# Patient Record
Sex: FEMALE | Race: BLACK OR AFRICAN AMERICAN | Employment: OTHER | URBAN - METROPOLITAN AREA
[De-identification: names, ages, dates, MRNs, and addresses within clinical notes are randomized per-mention and may not be internally consistent; named-entity substitution may affect disease eponyms.]

---

## 2017-01-03 ENCOUNTER — TELEPHONE (OUTPATIENT)
Dept: PRIMARY CARE CLINIC | Age: 67
End: 2017-01-03

## 2017-01-04 ENCOUNTER — OFFICE VISIT (OUTPATIENT)
Dept: PRIMARY CARE CLINIC | Age: 67
End: 2017-01-04

## 2017-01-04 VITALS
WEIGHT: 171 LBS | HEART RATE: 90 BPM | SYSTOLIC BLOOD PRESSURE: 130 MMHG | DIASTOLIC BLOOD PRESSURE: 80 MMHG | OXYGEN SATURATION: 98 % | BODY MASS INDEX: 29.35 KG/M2

## 2017-01-04 DIAGNOSIS — D17.1 LIPOMA OF BACK: Primary | ICD-10-CM

## 2017-01-04 PROCEDURE — 99213 OFFICE O/P EST LOW 20 MIN: CPT | Performed by: INTERNAL MEDICINE

## 2017-01-04 ASSESSMENT — ENCOUNTER SYMPTOMS
COUGH: 0
ABDOMINAL DISTENTION: 0
ALLERGIC/IMMUNOLOGIC NEGATIVE: 1
EYES NEGATIVE: 1
DIARRHEA: 0
CONSTIPATION: 0
BLOOD IN STOOL: 0
SHORTNESS OF BREATH: 0
WHEEZING: 0
CHEST TIGHTNESS: 0

## 2017-01-13 ENCOUNTER — INITIAL CONSULT (OUTPATIENT)
Dept: SURGERY | Age: 67
End: 2017-01-13

## 2017-01-13 ENCOUNTER — TELEPHONE (OUTPATIENT)
Dept: SURGERY | Age: 67
End: 2017-01-13

## 2017-01-13 VITALS
WEIGHT: 172 LBS | HEART RATE: 82 BPM | BODY MASS INDEX: 29.37 KG/M2 | SYSTOLIC BLOOD PRESSURE: 138 MMHG | DIASTOLIC BLOOD PRESSURE: 68 MMHG | HEIGHT: 64 IN

## 2017-01-13 DIAGNOSIS — D17.1 LIPOMA OF BACK: Primary | ICD-10-CM

## 2017-01-13 PROCEDURE — 99204 OFFICE O/P NEW MOD 45 MIN: CPT | Performed by: SURGERY

## 2017-01-13 ASSESSMENT — ENCOUNTER SYMPTOMS
COUGH: 0
BACK PAIN: 1
ABDOMINAL DISTENTION: 0
COLOR CHANGE: 0
NAUSEA: 0
DIARRHEA: 0
ABDOMINAL PAIN: 0
TROUBLE SWALLOWING: 0
BLOOD IN STOOL: 0
VOMITING: 0
CONSTIPATION: 0
RECTAL PAIN: 0
SHORTNESS OF BREATH: 0

## 2017-01-18 ENCOUNTER — PAT TELEPHONE (OUTPATIENT)
Dept: PREADMISSION TESTING | Age: 67
End: 2017-01-18

## 2017-01-18 VITALS — HEIGHT: 64 IN | WEIGHT: 172 LBS | BODY MASS INDEX: 29.37 KG/M2

## 2017-01-23 ENCOUNTER — TELEPHONE (OUTPATIENT)
Dept: SURGERY | Age: 67
End: 2017-01-23

## 2017-01-23 ENCOUNTER — HOSPITAL ENCOUNTER (OUTPATIENT)
Dept: SURGERY | Age: 67
Discharge: HOME | End: 2017-01-23
Attending: SURGERY | Admitting: SURGERY

## 2017-01-23 VITALS
OXYGEN SATURATION: 100 % | SYSTOLIC BLOOD PRESSURE: 134 MMHG | DIASTOLIC BLOOD PRESSURE: 76 MMHG | HEART RATE: 76 BPM | TEMPERATURE: 98 F | RESPIRATION RATE: 20 BRPM

## 2017-01-23 DIAGNOSIS — R22.2 MASS ON BACK: ICD-10-CM

## 2017-01-23 LAB
ANION GAP SERPL CALCULATED.3IONS-SCNC: 15 MMOL/L (ref 3–16)
BUN BLDV-MCNC: 18 MG/DL (ref 7–20)
CALCIUM SERPL-MCNC: 9.2 MG/DL (ref 8.3–10.6)
CHLORIDE BLD-SCNC: 99 MMOL/L (ref 99–110)
CO2: 25 MMOL/L (ref 21–32)
CREAT SERPL-MCNC: 0.9 MG/DL (ref 0.6–1.2)
GFR AFRICAN AMERICAN: >60
GFR NON-AFRICAN AMERICAN: >60
GLUCOSE BLD-MCNC: 100 MG/DL (ref 70–99)
HCT VFR BLD CALC: 33 % (ref 36–48)
HEMOGLOBIN: 10.9 G/DL (ref 12–16)
MCH RBC QN AUTO: 30 PG (ref 26–34)
MCHC RBC AUTO-ENTMCNC: 32.9 G/DL (ref 31–36)
MCV RBC AUTO: 91.3 FL (ref 80–100)
PDW BLD-RTO: 13.2 % (ref 12.4–15.4)
PLATELET # BLD: 305 K/UL (ref 135–450)
PMV BLD AUTO: 6.6 FL (ref 5–10.5)
POTASSIUM SERPL-SCNC: 4 MMOL/L (ref 3.5–5.1)
RBC # BLD: 3.61 M/UL (ref 4–5.2)
SODIUM BLD-SCNC: 139 MMOL/L (ref 136–145)
WBC # BLD: 5.8 K/UL (ref 4–11)

## 2017-01-23 PROCEDURE — 21931 EXC BACK LES SC 3 CM/>: CPT | Performed by: SURGERY

## 2017-01-23 PROCEDURE — 93010 ELECTROCARDIOGRAM REPORT: CPT | Performed by: INTERNAL MEDICINE

## 2017-01-23 RX ORDER — HYDROCODONE BITARTRATE AND ACETAMINOPHEN 5; 325 MG/1; MG/1
1 TABLET ORAL EVERY 6 HOURS PRN
Qty: 20 TABLET | Refills: 0 | Status: SHIPPED | OUTPATIENT
Start: 2017-01-23 | End: 2017-01-30

## 2017-01-23 RX ORDER — FENTANYL CITRATE 50 UG/ML
25 INJECTION, SOLUTION INTRAMUSCULAR; INTRAVENOUS EVERY 5 MIN PRN
Status: DISCONTINUED | OUTPATIENT
Start: 2017-01-23 | End: 2017-01-24 | Stop reason: HOSPADM

## 2017-01-23 RX ORDER — LABETALOL HYDROCHLORIDE 5 MG/ML
5 INJECTION, SOLUTION INTRAVENOUS EVERY 10 MIN PRN
Status: DISCONTINUED | OUTPATIENT
Start: 2017-01-23 | End: 2017-01-24 | Stop reason: HOSPADM

## 2017-01-23 RX ORDER — SODIUM CHLORIDE 0.9 % (FLUSH) 0.9 %
10 SYRINGE (ML) INJECTION PRN
Status: DISCONTINUED | OUTPATIENT
Start: 2017-01-23 | End: 2017-01-24 | Stop reason: HOSPADM

## 2017-01-23 RX ORDER — SODIUM CHLORIDE 9 MG/ML
INJECTION, SOLUTION INTRAVENOUS CONTINUOUS
Status: DISCONTINUED | OUTPATIENT
Start: 2017-01-23 | End: 2017-01-24 | Stop reason: HOSPADM

## 2017-01-23 RX ORDER — PROMETHAZINE HYDROCHLORIDE 25 MG/ML
6.25 INJECTION, SOLUTION INTRAMUSCULAR; INTRAVENOUS
Status: ACTIVE | OUTPATIENT
Start: 2017-01-23 | End: 2017-01-23

## 2017-01-23 RX ORDER — SODIUM CHLORIDE 0.9 % (FLUSH) 0.9 %
10 SYRINGE (ML) INJECTION EVERY 12 HOURS SCHEDULED
Status: DISCONTINUED | OUTPATIENT
Start: 2017-01-23 | End: 2017-01-24 | Stop reason: HOSPADM

## 2017-01-23 RX ADMIN — SODIUM CHLORIDE: 9 INJECTION, SOLUTION INTRAVENOUS at 07:20

## 2017-01-23 ASSESSMENT — PAIN - FUNCTIONAL ASSESSMENT: PAIN_FUNCTIONAL_ASSESSMENT: 0-10

## 2017-01-23 ASSESSMENT — PAIN DESCRIPTION - PAIN TYPE
TYPE: SURGICAL PAIN

## 2017-01-23 ASSESSMENT — PAIN SCALES - GENERAL
PAINLEVEL_OUTOF10: 0

## 2017-01-24 LAB
EKG ATRIAL RATE: 61 BPM
EKG DIAGNOSIS: NORMAL
EKG P AXIS: 59 DEGREES
EKG P-R INTERVAL: 172 MS
EKG Q-T INTERVAL: 404 MS
EKG QRS DURATION: 106 MS
EKG QTC CALCULATION (BAZETT): 406 MS
EKG R AXIS: 1 DEGREES
EKG T AXIS: 59 DEGREES
EKG VENTRICULAR RATE: 61 BPM

## 2017-01-27 ENCOUNTER — TELEPHONE (OUTPATIENT)
Dept: SURGERY | Age: 67
End: 2017-01-27

## 2017-02-01 ENCOUNTER — TELEPHONE (OUTPATIENT)
Dept: SURGERY | Age: 67
End: 2017-02-01

## 2017-02-10 ENCOUNTER — OFFICE VISIT (OUTPATIENT)
Dept: SURGERY | Age: 67
End: 2017-02-10

## 2017-02-10 VITALS
HEART RATE: 90 BPM | WEIGHT: 176 LBS | HEIGHT: 64 IN | SYSTOLIC BLOOD PRESSURE: 136 MMHG | BODY MASS INDEX: 30.05 KG/M2 | DIASTOLIC BLOOD PRESSURE: 79 MMHG

## 2017-02-10 DIAGNOSIS — R22.2 MASS ON BACK: Primary | ICD-10-CM

## 2017-02-10 PROCEDURE — 99024 POSTOP FOLLOW-UP VISIT: CPT | Performed by: SURGERY

## 2017-02-13 ENCOUNTER — TELEPHONE (OUTPATIENT)
Dept: SURGERY | Age: 67
End: 2017-02-13

## 2017-02-16 ENCOUNTER — TELEPHONE (OUTPATIENT)
Dept: SURGERY | Age: 67
End: 2017-02-16

## 2017-02-16 DIAGNOSIS — Z01.818 PRE-OP EVALUATION: Primary | ICD-10-CM

## 2017-02-23 ENCOUNTER — HOSPITAL ENCOUNTER (OUTPATIENT)
Dept: CT IMAGING | Age: 67
Discharge: HOME | End: 2017-02-23
Attending: SURGERY | Admitting: SURGERY

## 2017-02-23 VITALS
RESPIRATION RATE: 18 BRPM | WEIGHT: 175.27 LBS | HEART RATE: 62 BPM | TEMPERATURE: 96.8 F | BODY MASS INDEX: 29.92 KG/M2 | SYSTOLIC BLOOD PRESSURE: 119 MMHG | DIASTOLIC BLOOD PRESSURE: 63 MMHG | OXYGEN SATURATION: 100 % | HEIGHT: 64 IN

## 2017-02-23 DIAGNOSIS — R22.2 LOCALIZED SWELLING, MASS AND LUMP, TRUNK: ICD-10-CM

## 2017-02-23 DIAGNOSIS — Z01.818 PRE-OP EVALUATION: ICD-10-CM

## 2017-02-23 DIAGNOSIS — R22.2 MASS ON BACK: ICD-10-CM

## 2017-02-23 LAB
INR BLD: 1.02 (ref 0.85–1.15)
PLATELET # BLD: 337 K/UL (ref 135–450)
PROTHROMBIN TIME: 11.5 SEC (ref 9.6–13)

## 2017-02-23 RX ORDER — FENTANYL CITRATE 50 UG/ML
INJECTION, SOLUTION INTRAMUSCULAR; INTRAVENOUS DAILY PRN
Status: COMPLETED | OUTPATIENT
Start: 2017-02-23 | End: 2017-02-23

## 2017-02-23 RX ORDER — ONDANSETRON 2 MG/ML
4 INJECTION INTRAMUSCULAR; INTRAVENOUS EVERY 8 HOURS PRN
Status: DISCONTINUED | OUTPATIENT
Start: 2017-02-23 | End: 2017-02-24 | Stop reason: HOSPADM

## 2017-02-23 RX ORDER — MIDAZOLAM HYDROCHLORIDE 1 MG/ML
INJECTION INTRAMUSCULAR; INTRAVENOUS DAILY PRN
Status: COMPLETED | OUTPATIENT
Start: 2017-02-23 | End: 2017-02-23

## 2017-02-23 RX ADMIN — FENTANYL CITRATE 50 MCG: 50 INJECTION, SOLUTION INTRAMUSCULAR; INTRAVENOUS at 12:20

## 2017-02-23 RX ADMIN — MIDAZOLAM HYDROCHLORIDE 1 MG: 1 INJECTION INTRAMUSCULAR; INTRAVENOUS at 12:20

## 2017-02-23 ASSESSMENT — PAIN - FUNCTIONAL ASSESSMENT: PAIN_FUNCTIONAL_ASSESSMENT: 0-10

## 2017-02-26 LAB
BODY FLUID CULTURE, STERILE: NORMAL
GRAM STAIN RESULT: NORMAL

## 2017-02-28 ENCOUNTER — TELEPHONE (OUTPATIENT)
Dept: SURGERY | Age: 67
End: 2017-02-28

## 2017-04-24 RX ORDER — AMLODIPINE BESYLATE 5 MG/1
5 TABLET ORAL DAILY
Qty: 30 TABLET | Refills: 3 | Status: SHIPPED | OUTPATIENT
Start: 2017-04-24 | End: 2017-04-26

## 2017-04-26 ENCOUNTER — OFFICE VISIT (OUTPATIENT)
Dept: PRIMARY CARE CLINIC | Age: 67
End: 2017-04-26

## 2017-04-26 VITALS
WEIGHT: 173 LBS | SYSTOLIC BLOOD PRESSURE: 120 MMHG | HEART RATE: 72 BPM | BODY MASS INDEX: 29.7 KG/M2 | TEMPERATURE: 98 F | DIASTOLIC BLOOD PRESSURE: 70 MMHG | OXYGEN SATURATION: 98 %

## 2017-04-26 DIAGNOSIS — I10 ESSENTIAL HYPERTENSION: Primary | ICD-10-CM

## 2017-04-26 DIAGNOSIS — T78.40XD ALLERGY, SUBSEQUENT ENCOUNTER: ICD-10-CM

## 2017-04-26 PROCEDURE — 99213 OFFICE O/P EST LOW 20 MIN: CPT | Performed by: INTERNAL MEDICINE

## 2017-04-26 RX ORDER — SENNA AND DOCUSATE SODIUM 50; 8.6 MG/1; MG/1
1 TABLET, FILM COATED ORAL DAILY
COMMUNITY
End: 2017-09-19

## 2017-04-26 RX ORDER — CELECOXIB 200 MG/1
200 CAPSULE ORAL 2 TIMES DAILY
COMMUNITY
End: 2018-03-22

## 2017-04-26 RX ORDER — OXYCODONE HYDROCHLORIDE 5 MG/1
5 TABLET ORAL EVERY 4 HOURS PRN
COMMUNITY
End: 2017-04-26

## 2017-04-26 RX ORDER — HYDROCHLOROTHIAZIDE 25 MG/1
12.5 TABLET ORAL DAILY
Qty: 30 TABLET | Refills: 3 | Status: SHIPPED | OUTPATIENT
Start: 2017-04-26 | End: 2017-09-19 | Stop reason: SDUPTHER

## 2017-04-26 RX ORDER — OXYCODONE HYDROCHLORIDE 5 MG/1
5 CAPSULE ORAL EVERY 4 HOURS PRN
COMMUNITY
End: 2017-09-19

## 2017-04-26 ASSESSMENT — ENCOUNTER SYMPTOMS
DIARRHEA: 0
SHORTNESS OF BREATH: 0
ABDOMINAL DISTENTION: 0
ALLERGIC REACTION: 1
CHEST TIGHTNESS: 0
ALLERGIC/IMMUNOLOGIC NEGATIVE: 1
CONSTIPATION: 0
EYES NEGATIVE: 1
BLOOD IN STOOL: 0
WHEEZING: 0
COUGH: 0

## 2017-05-03 ENCOUNTER — TELEPHONE (OUTPATIENT)
Dept: PRIMARY CARE CLINIC | Age: 67
End: 2017-05-03

## 2017-08-25 DIAGNOSIS — K21.9 GASTROESOPHAGEAL REFLUX DISEASE WITHOUT ESOPHAGITIS: ICD-10-CM

## 2017-08-25 RX ORDER — ESOMEPRAZOLE MAGNESIUM 40 MG/1
40 CAPSULE, DELAYED RELEASE ORAL DAILY PRN
Qty: 30 CAPSULE | Refills: 3 | Status: SHIPPED | OUTPATIENT
Start: 2017-08-25 | End: 2018-03-22 | Stop reason: SDUPTHER

## 2017-09-19 ENCOUNTER — OFFICE VISIT (OUTPATIENT)
Dept: PRIMARY CARE CLINIC | Age: 67
End: 2017-09-19

## 2017-09-19 VITALS
SYSTOLIC BLOOD PRESSURE: 150 MMHG | HEIGHT: 65 IN | RESPIRATION RATE: 16 BRPM | DIASTOLIC BLOOD PRESSURE: 80 MMHG | HEART RATE: 78 BPM | OXYGEN SATURATION: 98 % | WEIGHT: 191 LBS | TEMPERATURE: 98.2 F | BODY MASS INDEX: 31.82 KG/M2

## 2017-09-19 DIAGNOSIS — N61.0 CELLULITIS OF LEFT BREAST: Primary | ICD-10-CM

## 2017-09-19 DIAGNOSIS — Z23 FLU VACCINE NEED: ICD-10-CM

## 2017-09-19 DIAGNOSIS — E78.5 HYPERLIPIDEMIA, UNSPECIFIED HYPERLIPIDEMIA TYPE: ICD-10-CM

## 2017-09-19 DIAGNOSIS — Z23 NEED FOR TDAP VACCINATION: ICD-10-CM

## 2017-09-19 DIAGNOSIS — I10 ESSENTIAL HYPERTENSION: ICD-10-CM

## 2017-09-19 PROCEDURE — 99214 OFFICE O/P EST MOD 30 MIN: CPT | Performed by: INTERNAL MEDICINE

## 2017-09-19 RX ORDER — CEPHALEXIN 500 MG/1
500 CAPSULE ORAL 3 TIMES DAILY
Qty: 30 CAPSULE | Refills: 0 | Status: SHIPPED | OUTPATIENT
Start: 2017-09-19 | End: 2018-03-22

## 2017-09-19 RX ORDER — HYDROCHLOROTHIAZIDE 25 MG/1
12.5 TABLET ORAL DAILY
Qty: 30 TABLET | Refills: 3 | Status: SHIPPED | OUTPATIENT
Start: 2017-09-19 | End: 2018-03-22 | Stop reason: SDUPTHER

## 2017-09-19 RX ORDER — SIMVASTATIN 20 MG
20 TABLET ORAL NIGHTLY
Qty: 90 TABLET | Refills: 3 | Status: SHIPPED | OUTPATIENT
Start: 2017-09-19 | End: 2018-03-22

## 2017-09-19 ASSESSMENT — ENCOUNTER SYMPTOMS
CONSTIPATION: 0
CHEST TIGHTNESS: 0
COUGH: 0
COLOR CHANGE: 1
ABDOMINAL DISTENTION: 0
ALLERGIC/IMMUNOLOGIC NEGATIVE: 1
DIARRHEA: 0
BLOOD IN STOOL: 0
EYES NEGATIVE: 1
WHEEZING: 0
SHORTNESS OF BREATH: 0

## 2017-09-19 ASSESSMENT — PATIENT HEALTH QUESTIONNAIRE - PHQ9
SUM OF ALL RESPONSES TO PHQ QUESTIONS 1-9: 0
2. FEELING DOWN, DEPRESSED OR HOPELESS: 0
SUM OF ALL RESPONSES TO PHQ9 QUESTIONS 1 & 2: 0
1. LITTLE INTEREST OR PLEASURE IN DOING THINGS: 0

## 2017-11-28 DIAGNOSIS — E78.5 HYPERLIPIDEMIA, UNSPECIFIED HYPERLIPIDEMIA TYPE: ICD-10-CM

## 2017-12-08 RX ORDER — SIMVASTATIN 20 MG
TABLET ORAL
Qty: 90 TABLET | Refills: 5 | Status: SHIPPED | OUTPATIENT
Start: 2017-12-08 | End: 2018-03-22 | Stop reason: SDUPTHER

## 2018-03-22 ENCOUNTER — OFFICE VISIT (OUTPATIENT)
Dept: PRIMARY CARE CLINIC | Age: 68
End: 2018-03-22

## 2018-03-22 VITALS
SYSTOLIC BLOOD PRESSURE: 160 MMHG | HEIGHT: 65 IN | HEART RATE: 66 BPM | OXYGEN SATURATION: 100 % | TEMPERATURE: 98.4 F | WEIGHT: 186 LBS | BODY MASS INDEX: 30.99 KG/M2 | DIASTOLIC BLOOD PRESSURE: 90 MMHG

## 2018-03-22 DIAGNOSIS — K21.9 GASTROESOPHAGEAL REFLUX DISEASE WITHOUT ESOPHAGITIS: ICD-10-CM

## 2018-03-22 DIAGNOSIS — E78.5 HYPERLIPIDEMIA, UNSPECIFIED HYPERLIPIDEMIA TYPE: ICD-10-CM

## 2018-03-22 DIAGNOSIS — E55.9 VITAMIN D DEFICIENCY: ICD-10-CM

## 2018-03-22 DIAGNOSIS — Z00.00 PHYSICAL EXAM: ICD-10-CM

## 2018-03-22 DIAGNOSIS — I10 ESSENTIAL HYPERTENSION: Primary | ICD-10-CM

## 2018-03-22 LAB
A/G RATIO: 1.8 (ref 1.1–2.2)
ALBUMIN SERPL-MCNC: 5.1 G/DL (ref 3.4–5)
ALP BLD-CCNC: 86 U/L (ref 40–129)
ALT SERPL-CCNC: 22 U/L (ref 10–40)
ANION GAP SERPL CALCULATED.3IONS-SCNC: 14 MMOL/L (ref 3–16)
AST SERPL-CCNC: 14 U/L (ref 15–37)
BASOPHILS ABSOLUTE: 0 K/UL (ref 0–0.2)
BASOPHILS RELATIVE PERCENT: 0.3 %
BILIRUB SERPL-MCNC: 0.3 MG/DL (ref 0–1)
BILIRUBIN URINE: NEGATIVE
BLOOD, URINE: NEGATIVE
BUN BLDV-MCNC: 16 MG/DL (ref 7–20)
CALCIUM SERPL-MCNC: 10.6 MG/DL (ref 8.3–10.6)
CHLORIDE BLD-SCNC: 97 MMOL/L (ref 99–110)
CHOLESTEROL, TOTAL: 210 MG/DL (ref 0–199)
CLARITY: CLEAR
CO2: 30 MMOL/L (ref 21–32)
COLOR: YELLOW
CREAT SERPL-MCNC: 0.9 MG/DL (ref 0.6–1.2)
EOSINOPHILS ABSOLUTE: 0 K/UL (ref 0–0.6)
EOSINOPHILS RELATIVE PERCENT: 0.3 %
EPITHELIAL CELLS, UA: 1 /HPF (ref 0–5)
GFR AFRICAN AMERICAN: >60
GFR NON-AFRICAN AMERICAN: >60
GLOBULIN: 2.8 G/DL
GLUCOSE BLD-MCNC: 119 MG/DL (ref 70–99)
GLUCOSE URINE: NEGATIVE MG/DL
HCT VFR BLD CALC: 40.4 % (ref 36–48)
HDLC SERPL-MCNC: 99 MG/DL (ref 40–60)
HEMOGLOBIN: 13.7 G/DL (ref 12–16)
HYALINE CASTS: 0 /LPF (ref 0–8)
KETONES, URINE: NEGATIVE MG/DL
LDL CHOLESTEROL CALCULATED: 78 MG/DL
LEUKOCYTE ESTERASE, URINE: ABNORMAL
LYMPHOCYTES ABSOLUTE: 3.7 K/UL (ref 1–5.1)
LYMPHOCYTES RELATIVE PERCENT: 27.7 %
MCH RBC QN AUTO: 31.7 PG (ref 26–34)
MCHC RBC AUTO-ENTMCNC: 33.8 G/DL (ref 31–36)
MCV RBC AUTO: 93.7 FL (ref 80–100)
MICROSCOPIC EXAMINATION: YES
MONOCYTES ABSOLUTE: 0.5 K/UL (ref 0–1.3)
MONOCYTES RELATIVE PERCENT: 3.4 %
NEUTROPHILS ABSOLUTE: 9.2 K/UL (ref 1.7–7.7)
NEUTROPHILS RELATIVE PERCENT: 68.3 %
NITRITE, URINE: NEGATIVE
PDW BLD-RTO: 13.8 % (ref 12.4–15.4)
PH UA: 6.5
PLATELET # BLD: 400 K/UL (ref 135–450)
PMV BLD AUTO: 7.3 FL (ref 5–10.5)
POTASSIUM SERPL-SCNC: 5.5 MMOL/L (ref 3.5–5.1)
PROTEIN UA: NEGATIVE MG/DL
RBC # BLD: 4.31 M/UL (ref 4–5.2)
RBC UA: 1 /HPF (ref 0–4)
SODIUM BLD-SCNC: 141 MMOL/L (ref 136–145)
SPECIFIC GRAVITY UA: 1.01
TOTAL PROTEIN: 7.9 G/DL (ref 6.4–8.2)
TRIGL SERPL-MCNC: 165 MG/DL (ref 0–150)
TSH SERPL DL<=0.05 MIU/L-ACNC: 1.46 UIU/ML (ref 0.27–4.2)
URINE TYPE: ABNORMAL
UROBILINOGEN, URINE: 0.2 E.U./DL
VITAMIN D 25-HYDROXY: 38.5 NG/ML
VLDLC SERPL CALC-MCNC: 33 MG/DL
WBC # BLD: 13.5 K/UL (ref 4–11)
WBC UA: 1 /HPF (ref 0–5)

## 2018-03-22 PROCEDURE — 99214 OFFICE O/P EST MOD 30 MIN: CPT | Performed by: INTERNAL MEDICINE

## 2018-03-22 RX ORDER — SIMVASTATIN 20 MG
20 TABLET ORAL NIGHTLY
Qty: 90 TABLET | Refills: 5 | Status: SHIPPED | OUTPATIENT
Start: 2018-03-22 | End: 2019-10-15 | Stop reason: SDUPTHER

## 2018-03-22 RX ORDER — HYDROCHLOROTHIAZIDE 25 MG/1
12.5 TABLET ORAL DAILY
Qty: 30 TABLET | Refills: 3 | Status: SHIPPED | OUTPATIENT
Start: 2018-03-22 | End: 2018-04-18 | Stop reason: SDUPTHER

## 2018-03-22 RX ORDER — ESOMEPRAZOLE MAGNESIUM 40 MG/1
40 CAPSULE, DELAYED RELEASE ORAL DAILY PRN
Qty: 30 CAPSULE | Refills: 3 | Status: SHIPPED | OUTPATIENT
Start: 2018-03-22 | End: 2018-12-17 | Stop reason: SDUPTHER

## 2018-03-22 ASSESSMENT — ENCOUNTER SYMPTOMS
CHEST TIGHTNESS: 0
DIARRHEA: 0
EYES NEGATIVE: 1
CONSTIPATION: 0
BLOOD IN STOOL: 0
ABDOMINAL DISTENTION: 0
WHEEZING: 0
SHORTNESS OF BREATH: 0
ALLERGIC/IMMUNOLOGIC NEGATIVE: 1
COUGH: 0

## 2018-03-22 NOTE — PROGRESS NOTES
Subjective:      Patient ID: Eldon Ge is a 75 y.o. female. 3/22/18 Patient presents with: Annual Exam  Generally well     Was worried about  ; Denies any anxiety / Depression             Last seen  9/19/17 Patient presents with:  Breast Pain: c/o left breast lump tender to the touch. Check-Up          Last seen  4/26/17 Patient presents with:  Hypertension: patient states she had a reaction to Lisinopril medication. Allergic Reaction                Review of Systems   Constitutional: Negative for activity change, appetite change, chills, fatigue and fever. Unexpected weight change: voluntary         Flu  Vac  10/17    tdap  10/16    Zostavac 2013 / [employer] . Pneumonia vac 10/15   HENT:        Dental ex   Reg    Eyes: Negative. Negative for visual disturbance. Eye  Ex  1/18  . Wears Glasses    Respiratory: Negative for cough, chest tightness, shortness of breath and wheezing. Does not smoke . No Etoh . No asthma   Cardiovascular: Negative. HTN > 10 yrs  , No known CAD     Mom had MI in 76s   Gastrointestinal: Negative for abdominal distention, blood in stool, constipation and diarrhea. Upper / Lower Endoscopy 3/2014  . Clean . Good for 10 yrs . Dr Ariadna Dickerson      Was advised Capsule endoscopy but did not f/u        Endocrine: Negative. No FH of diabetes   Genitourinary: Negative for dysuria and frequency. S/P Hysterectomy . Mammogram 3/17    Daughter 39, with Breast Cancer   Musculoskeletal: Negative for joint swelling. DEXA 11/13 . No osteopenia    S/P Rt knee replac 4/10/17 . Taking Prednisone for it     Left knee 2009    Allergic/Immunologic: Negative. Negative for food allergies. Neurological: Negative for dizziness, weakness and headaches. Hematological:        Anemia continues    Psychiatric/Behavioral: Negative for dysphoric mood and sleep disturbance.        Objective:   Physical Exam   Constitutional: She is oriented to person,

## 2018-03-23 LAB
ESTIMATED AVERAGE GLUCOSE: 128.4 MG/DL
HBA1C MFR BLD: 6.1 %
HEPATITIS C ANTIBODY INTERPRETATION: NORMAL

## 2018-04-18 ENCOUNTER — OFFICE VISIT (OUTPATIENT)
Dept: PRIMARY CARE CLINIC | Age: 68
End: 2018-04-18

## 2018-04-18 VITALS
HEART RATE: 76 BPM | TEMPERATURE: 98.5 F | OXYGEN SATURATION: 100 % | SYSTOLIC BLOOD PRESSURE: 150 MMHG | WEIGHT: 189 LBS | DIASTOLIC BLOOD PRESSURE: 80 MMHG | BODY MASS INDEX: 31.49 KG/M2 | HEIGHT: 65 IN

## 2018-04-18 DIAGNOSIS — I10 ESSENTIAL HYPERTENSION: Primary | ICD-10-CM

## 2018-04-18 PROCEDURE — 99213 OFFICE O/P EST LOW 20 MIN: CPT | Performed by: INTERNAL MEDICINE

## 2018-04-18 RX ORDER — HYDROCHLOROTHIAZIDE 25 MG/1
25 TABLET ORAL DAILY
Qty: 30 TABLET | Refills: 3 | Status: SHIPPED | OUTPATIENT
Start: 2018-04-18 | End: 2018-05-30 | Stop reason: SDUPTHER

## 2018-04-18 ASSESSMENT — ENCOUNTER SYMPTOMS
COUGH: 0
ABDOMINAL DISTENTION: 0
BLOOD IN STOOL: 0
CHEST TIGHTNESS: 0
DIARRHEA: 0
WHEEZING: 0
EYES NEGATIVE: 1
ALLERGIC/IMMUNOLOGIC NEGATIVE: 1
CONSTIPATION: 0
SHORTNESS OF BREATH: 0

## 2018-05-30 ENCOUNTER — OFFICE VISIT (OUTPATIENT)
Dept: PRIMARY CARE CLINIC | Age: 68
End: 2018-05-30

## 2018-05-30 VITALS
DIASTOLIC BLOOD PRESSURE: 80 MMHG | HEART RATE: 60 BPM | SYSTOLIC BLOOD PRESSURE: 160 MMHG | OXYGEN SATURATION: 100 % | HEIGHT: 65 IN | BODY MASS INDEX: 31.99 KG/M2 | WEIGHT: 192 LBS | TEMPERATURE: 97.7 F

## 2018-05-30 DIAGNOSIS — F41.9 ANXIETY: ICD-10-CM

## 2018-05-30 DIAGNOSIS — R51.9 ACUTE NONINTRACTABLE HEADACHE, UNSPECIFIED HEADACHE TYPE: Primary | ICD-10-CM

## 2018-05-30 DIAGNOSIS — I10 ESSENTIAL HYPERTENSION: ICD-10-CM

## 2018-05-30 DIAGNOSIS — R51.9 ACUTE NONINTRACTABLE HEADACHE, UNSPECIFIED HEADACHE TYPE: ICD-10-CM

## 2018-05-30 LAB
BASOPHILS ABSOLUTE: 0 K/UL (ref 0–0.2)
BASOPHILS RELATIVE PERCENT: 0.6 %
EOSINOPHILS ABSOLUTE: 0.1 K/UL (ref 0–0.6)
EOSINOPHILS RELATIVE PERCENT: 1.9 %
HCT VFR BLD CALC: 37.6 % (ref 36–48)
HEMOGLOBIN: 12.6 G/DL (ref 12–16)
LYMPHOCYTES ABSOLUTE: 3.2 K/UL (ref 1–5.1)
LYMPHOCYTES RELATIVE PERCENT: 50.3 %
MCH RBC QN AUTO: 31.5 PG (ref 26–34)
MCHC RBC AUTO-ENTMCNC: 33.7 G/DL (ref 31–36)
MCV RBC AUTO: 93.5 FL (ref 80–100)
MONOCYTES ABSOLUTE: 0.6 K/UL (ref 0–1.3)
MONOCYTES RELATIVE PERCENT: 9.4 %
NEUTROPHILS ABSOLUTE: 2.4 K/UL (ref 1.7–7.7)
NEUTROPHILS RELATIVE PERCENT: 37.8 %
PDW BLD-RTO: 14 % (ref 12.4–15.4)
PLATELET # BLD: 304 K/UL (ref 135–450)
PMV BLD AUTO: 7.8 FL (ref 5–10.5)
RBC # BLD: 4.02 M/UL (ref 4–5.2)
SEDIMENTATION RATE, ERYTHROCYTE: 18 MM/HR (ref 0–30)
WBC # BLD: 6.4 K/UL (ref 4–11)

## 2018-05-30 PROCEDURE — 99214 OFFICE O/P EST MOD 30 MIN: CPT | Performed by: INTERNAL MEDICINE

## 2018-05-30 RX ORDER — HYDROCHLOROTHIAZIDE 25 MG/1
25 TABLET ORAL DAILY
Qty: 30 TABLET | Refills: 3 | Status: SHIPPED | OUTPATIENT
Start: 2018-05-30 | End: 2018-06-06 | Stop reason: SDUPTHER

## 2018-05-30 RX ORDER — ALPRAZOLAM 0.25 MG/1
0.25 TABLET ORAL 3 TIMES DAILY PRN
Qty: 30 TABLET | Refills: 1 | Status: SHIPPED | OUTPATIENT
Start: 2018-05-30 | End: 2022-03-28 | Stop reason: SDUPTHER

## 2018-05-30 RX ORDER — ACETAMINOPHEN 500 MG
500 TABLET ORAL EVERY 6 HOURS PRN
Qty: 40 TABLET | Refills: 3 | Status: SHIPPED | OUTPATIENT
Start: 2018-05-30

## 2018-05-30 ASSESSMENT — ENCOUNTER SYMPTOMS
WHEEZING: 0
ABDOMINAL DISTENTION: 0
SCALP TENDERNESS: 0
EYES NEGATIVE: 1
BLOOD IN STOOL: 0
ALLERGIC/IMMUNOLOGIC NEGATIVE: 1
SHORTNESS OF BREATH: 0
CHEST TIGHTNESS: 0
DIARRHEA: 0
CONSTIPATION: 0

## 2018-06-01 ENCOUNTER — HOSPITAL ENCOUNTER (OUTPATIENT)
Dept: CT IMAGING | Age: 68
Discharge: HOME | End: 2018-06-01
Attending: INTERNAL MEDICINE | Admitting: INTERNAL MEDICINE

## 2018-06-01 ENCOUNTER — TELEPHONE (OUTPATIENT)
Dept: PRIMARY CARE CLINIC | Age: 68
End: 2018-06-01

## 2018-06-01 DIAGNOSIS — G44.52 NEW DAILY PERSISTENT HEADACHE: Primary | ICD-10-CM

## 2018-06-01 DIAGNOSIS — G44.52 NEW DAILY PERSISTENT HEADACHE: ICD-10-CM

## 2018-06-01 DIAGNOSIS — G44.52 NEW DAILY PERSISTENT HEADACHE (NDPH): ICD-10-CM

## 2018-06-01 LAB
GFR AFRICAN AMERICAN: 60
GFR NON-AFRICAN AMERICAN: 49
PERFORMED ON: ABNORMAL
POC CREATININE: 1.1 MG/DL (ref 0.6–1.2)
POC SAMPLE TYPE: ABNORMAL

## 2018-06-01 RX ORDER — BUTALBITAL, ACETAMINOPHEN, CAFFEINE AND CODEINE PHOSPHATE 300; 50; 40; 30 MG/1; MG/1; MG/1; MG/1
1 CAPSULE ORAL EVERY 6 HOURS PRN
Qty: 30 CAPSULE | Refills: 0 | Status: SHIPPED | OUTPATIENT
Start: 2018-06-01 | End: 2018-06-06

## 2018-06-06 ENCOUNTER — OFFICE VISIT (OUTPATIENT)
Dept: PRIMARY CARE CLINIC | Age: 68
End: 2018-06-06

## 2018-06-06 VITALS
WEIGHT: 195 LBS | HEIGHT: 65 IN | SYSTOLIC BLOOD PRESSURE: 155 MMHG | HEART RATE: 60 BPM | DIASTOLIC BLOOD PRESSURE: 80 MMHG | BODY MASS INDEX: 32.49 KG/M2 | OXYGEN SATURATION: 100 % | TEMPERATURE: 97.9 F

## 2018-06-06 DIAGNOSIS — R51.9 NONINTRACTABLE HEADACHE, UNSPECIFIED CHRONICITY PATTERN, UNSPECIFIED HEADACHE TYPE: ICD-10-CM

## 2018-06-06 DIAGNOSIS — I10 ESSENTIAL HYPERTENSION: ICD-10-CM

## 2018-06-06 PROCEDURE — 99213 OFFICE O/P EST LOW 20 MIN: CPT | Performed by: INTERNAL MEDICINE

## 2018-06-06 RX ORDER — LABETALOL 100 MG/1
100 TABLET, FILM COATED ORAL 2 TIMES DAILY
Qty: 60 TABLET | Refills: 3 | Status: SHIPPED | OUTPATIENT
Start: 2018-06-06 | End: 2018-06-25 | Stop reason: SDUPTHER

## 2018-06-06 RX ORDER — HYDROCHLOROTHIAZIDE 25 MG/1
25 TABLET ORAL DAILY
Qty: 30 TABLET | Refills: 3 | Status: SHIPPED | OUTPATIENT
Start: 2018-06-06 | End: 2018-06-25 | Stop reason: SDUPTHER

## 2018-06-06 RX ORDER — BUTALBITAL, ACETAMINOPHEN AND CAFFEINE 50; 325; 40 MG/1; MG/1; MG/1
1 TABLET ORAL EVERY 6 HOURS PRN
Qty: 30 TABLET | Refills: 3 | Status: SHIPPED | OUTPATIENT
Start: 2018-06-06 | End: 2018-07-26 | Stop reason: SDUPTHER

## 2018-06-06 ASSESSMENT — ENCOUNTER SYMPTOMS
DIARRHEA: 0
SCALP TENDERNESS: 0
EYES NEGATIVE: 1
CONSTIPATION: 0
ABDOMINAL DISTENTION: 0
WHEEZING: 0
SHORTNESS OF BREATH: 0
CHEST TIGHTNESS: 0
BLOOD IN STOOL: 0
ALLERGIC/IMMUNOLOGIC NEGATIVE: 1

## 2018-06-25 ENCOUNTER — OFFICE VISIT (OUTPATIENT)
Dept: PRIMARY CARE CLINIC | Age: 68
End: 2018-06-25

## 2018-06-25 VITALS
WEIGHT: 192 LBS | BODY MASS INDEX: 31.99 KG/M2 | TEMPERATURE: 98.8 F | SYSTOLIC BLOOD PRESSURE: 120 MMHG | HEIGHT: 65 IN | HEART RATE: 62 BPM | OXYGEN SATURATION: 100 % | DIASTOLIC BLOOD PRESSURE: 70 MMHG

## 2018-06-25 DIAGNOSIS — I10 ESSENTIAL HYPERTENSION: ICD-10-CM

## 2018-06-25 DIAGNOSIS — R51.9 NONINTRACTABLE HEADACHE, UNSPECIFIED CHRONICITY PATTERN, UNSPECIFIED HEADACHE TYPE: ICD-10-CM

## 2018-06-25 PROCEDURE — 99213 OFFICE O/P EST LOW 20 MIN: CPT | Performed by: INTERNAL MEDICINE

## 2018-06-25 RX ORDER — HYDROCHLOROTHIAZIDE 25 MG/1
25 TABLET ORAL DAILY
Qty: 30 TABLET | Refills: 6 | Status: SHIPPED | OUTPATIENT
Start: 2018-06-25 | End: 2018-07-26 | Stop reason: SDUPTHER

## 2018-06-25 RX ORDER — LABETALOL 100 MG/1
100 TABLET, FILM COATED ORAL 2 TIMES DAILY
Qty: 60 TABLET | Refills: 6 | Status: SHIPPED | OUTPATIENT
Start: 2018-06-25 | End: 2018-07-26 | Stop reason: SDUPTHER

## 2018-06-25 ASSESSMENT — ENCOUNTER SYMPTOMS
ALLERGIC/IMMUNOLOGIC NEGATIVE: 1
ABDOMINAL DISTENTION: 0
CONSTIPATION: 0
WHEEZING: 0
SHORTNESS OF BREATH: 0
SCALP TENDERNESS: 0
BLOOD IN STOOL: 0
EYES NEGATIVE: 1
DIARRHEA: 0
CHEST TIGHTNESS: 0

## 2018-07-26 ENCOUNTER — OFFICE VISIT (OUTPATIENT)
Dept: PRIMARY CARE CLINIC | Age: 68
End: 2018-07-26

## 2018-07-26 VITALS
OXYGEN SATURATION: 96 % | DIASTOLIC BLOOD PRESSURE: 65 MMHG | TEMPERATURE: 98.3 F | BODY MASS INDEX: 32.15 KG/M2 | HEIGHT: 65 IN | SYSTOLIC BLOOD PRESSURE: 135 MMHG | WEIGHT: 193 LBS | HEART RATE: 79 BPM

## 2018-07-26 DIAGNOSIS — R51.9 NONINTRACTABLE HEADACHE, UNSPECIFIED CHRONICITY PATTERN, UNSPECIFIED HEADACHE TYPE: ICD-10-CM

## 2018-07-26 DIAGNOSIS — I10 ESSENTIAL HYPERTENSION: Primary | ICD-10-CM

## 2018-07-26 DIAGNOSIS — F32.A ANXIETY AND DEPRESSION: ICD-10-CM

## 2018-07-26 DIAGNOSIS — F41.9 ANXIETY AND DEPRESSION: ICD-10-CM

## 2018-07-26 PROCEDURE — 99214 OFFICE O/P EST MOD 30 MIN: CPT | Performed by: INTERNAL MEDICINE

## 2018-07-26 RX ORDER — HYDROCHLOROTHIAZIDE 25 MG/1
25 TABLET ORAL DAILY
Qty: 30 TABLET | Refills: 6 | Status: SHIPPED | OUTPATIENT
Start: 2018-07-26 | End: 2019-07-10 | Stop reason: SDUPTHER

## 2018-07-26 RX ORDER — LABETALOL 100 MG/1
100 TABLET, FILM COATED ORAL 2 TIMES DAILY
Qty: 60 TABLET | Refills: 6 | Status: SHIPPED | OUTPATIENT
Start: 2018-07-26 | End: 2019-03-04 | Stop reason: SDUPTHER

## 2018-07-26 RX ORDER — BUTALBITAL, ACETAMINOPHEN AND CAFFEINE 50; 325; 40 MG/1; MG/1; MG/1
1 TABLET ORAL EVERY 6 HOURS PRN
Qty: 30 TABLET | Refills: 3 | Status: SHIPPED | OUTPATIENT
Start: 2018-07-26 | End: 2019-12-19

## 2018-07-26 ASSESSMENT — ENCOUNTER SYMPTOMS
DIARRHEA: 0
EYES NEGATIVE: 1
SHORTNESS OF BREATH: 0
BLOOD IN STOOL: 0
CONSTIPATION: 0
SCALP TENDERNESS: 0
ABDOMINAL DISTENTION: 0
WHEEZING: 0
ALLERGIC/IMMUNOLOGIC NEGATIVE: 1
CHEST TIGHTNESS: 0

## 2018-07-30 ENCOUNTER — TELEPHONE (OUTPATIENT)
Dept: PRIMARY CARE CLINIC | Age: 68
End: 2018-07-30

## 2018-07-31 ENCOUNTER — TELEPHONE (OUTPATIENT)
Dept: PRIMARY CARE CLINIC | Age: 68
End: 2018-07-31

## 2018-08-15 ENCOUNTER — OFFICE VISIT (OUTPATIENT)
Dept: PRIMARY CARE CLINIC | Age: 68
End: 2018-08-15

## 2018-08-15 VITALS
BODY MASS INDEX: 32.49 KG/M2 | WEIGHT: 195 LBS | HEART RATE: 64 BPM | HEIGHT: 65 IN | OXYGEN SATURATION: 100 % | TEMPERATURE: 97.9 F | SYSTOLIC BLOOD PRESSURE: 130 MMHG | DIASTOLIC BLOOD PRESSURE: 70 MMHG

## 2018-08-15 DIAGNOSIS — K62.5 RECTAL BLEED: Primary | ICD-10-CM

## 2018-08-15 DIAGNOSIS — I10 ESSENTIAL HYPERTENSION: ICD-10-CM

## 2018-08-15 DIAGNOSIS — F32.A ANXIETY AND DEPRESSION: ICD-10-CM

## 2018-08-15 DIAGNOSIS — F41.9 ANXIETY AND DEPRESSION: ICD-10-CM

## 2018-08-15 PROCEDURE — 99213 OFFICE O/P EST LOW 20 MIN: CPT | Performed by: INTERNAL MEDICINE

## 2018-08-15 RX ORDER — DOCUSATE SODIUM 100 MG/1
100 CAPSULE, LIQUID FILLED ORAL DAILY PRN
Qty: 30 CAPSULE | Refills: 0 | Status: SHIPPED | OUTPATIENT
Start: 2018-08-15 | End: 2019-12-19

## 2018-08-15 RX ORDER — HYDROCORTISONE ACETATE 25 MG/1
25 SUPPOSITORY RECTAL 2 TIMES DAILY PRN
Qty: 14 SUPPOSITORY | Refills: 0 | Status: SHIPPED | OUTPATIENT
Start: 2018-08-15 | End: 2018-08-22

## 2018-08-15 ASSESSMENT — ENCOUNTER SYMPTOMS
ABDOMINAL DISTENTION: 0
WHEEZING: 0
CONSTIPATION: 0
ALLERGIC/IMMUNOLOGIC NEGATIVE: 1
SHORTNESS OF BREATH: 0
EYES NEGATIVE: 1
HEMATOCHEZIA: 1
BLOOD IN STOOL: 1
DIARRHEA: 0
CHEST TIGHTNESS: 0

## 2018-08-15 NOTE — PROGRESS NOTES
well-nourished. No distress. Eyes: Conjunctivae are normal.   Neck: Neck supple. No neck rigidity. Cardiovascular: Normal rate, regular rhythm and normal heart sounds. Pulmonary/Chest: Breath sounds normal.   Abdominal: Soft. She exhibits distension. There is no tenderness. Musculoskeletal: Normal range of motion. Neurological: She is alert and oriented to person, place, and time. She has normal strength. Skin: Skin is warm and dry. Psychiatric: She has a normal mood and affect. Vitals reviewed. Assessment:   Sedrick Suarez was seen today for rectal bleeding, anxiety and hypertension. Diagnoses and all orders for this visit:    Rectal bleed    H/O Internal Haemorrhoid  ;Keep stool soft . Push fluids   -     docusate sodium (COLACE) 100 MG capsule; Take 1 capsule by mouth daily as needed for Constipation  -     hydrocortisone (ANUSOL-HC) 25 MG suppository; Place 1 suppository rectally 2 times daily as needed for Hemorrhoids        Essential hypertension  Controlled . Cont same   -     labetalol (TRANDATE) 100 MG tablet; Take 1 tablet by mouth 2 times daily  -     hydrochlorothiazide (HYDRODIURIL) 25 MG tablet; Take 1 tablet by mouth daily    Anxiety and depression  Xanax  0.25 tid prn only . Nonintractable headache, unspecified chronicity pattern, unspecified headache type  -     butalbital-acetaminophen-caffeine (FIORICET, ESGIC) -40 MG per tablet; Take 1 tablet by mouth every 6 hours as needed for Headaches    . Hyperlipidemia, unspecified hyperlipidemia type controlled   -     simvastatin (ZOCOR) 20 MG tablet;  Take 1 tablet by mouth nightly                                Plan:      Self Management Goals       Know which medication is for what condition:   Know side effects of medications, and discuss with doctor   Discuss side effects and instructions on new medications  Know correct dose/frequency of medications  Take medications at the same time each day  Stay current on medication refills  If taking OTC's check with MD/pharmacy first about interactions    LDL goal 418 or less  Systolic BP < or equal to 666  Diastolic BP < or equal to 85  Current Flu   Set targets for weight loss 4 lbs per month  Exercise 3-5 times per week  Keep check of weight  Weighting machine    On a scale of 1 to 5 how confident are you in these goals?   4/5

## 2018-08-23 NOTE — TELEPHONE ENCOUNTER
Patient called stating the hydrocortisone suppository that was prescribed to her was too costly and she could not afford it. Patient wants to know if there is an alternative she can be prescribed?  Please advise

## 2018-09-18 ENCOUNTER — HOSPITAL ENCOUNTER (OUTPATIENT)
Dept: OTHER | Age: 68
Discharge: HOME | End: 2018-09-18
Attending: INTERNAL MEDICINE | Admitting: INTERNAL MEDICINE

## 2018-09-18 ENCOUNTER — OFFICE VISIT (OUTPATIENT)
Dept: PRIMARY CARE CLINIC | Age: 68
End: 2018-09-18

## 2018-09-18 VITALS
OXYGEN SATURATION: 96 % | DIASTOLIC BLOOD PRESSURE: 80 MMHG | TEMPERATURE: 97.5 F | SYSTOLIC BLOOD PRESSURE: 140 MMHG | BODY MASS INDEX: 32.32 KG/M2 | WEIGHT: 194 LBS | HEART RATE: 63 BPM | HEIGHT: 65 IN

## 2018-09-18 DIAGNOSIS — M54.6 CHRONIC RIGHT-SIDED THORACIC BACK PAIN: ICD-10-CM

## 2018-09-18 DIAGNOSIS — G89.29 CHRONIC RIGHT-SIDED THORACIC BACK PAIN: ICD-10-CM

## 2018-09-18 DIAGNOSIS — I10 ESSENTIAL HYPERTENSION: ICD-10-CM

## 2018-09-18 DIAGNOSIS — Z23 NEED FOR SHINGLES VACCINE: ICD-10-CM

## 2018-09-18 DIAGNOSIS — F32.89 OTHER DEPRESSION: ICD-10-CM

## 2018-09-18 DIAGNOSIS — Z23 FLU VACCINE NEED: ICD-10-CM

## 2018-09-18 PROCEDURE — 90662 IIV NO PRSV INCREASED AG IM: CPT | Performed by: INTERNAL MEDICINE

## 2018-09-18 PROCEDURE — 99214 OFFICE O/P EST MOD 30 MIN: CPT | Performed by: INTERNAL MEDICINE

## 2018-09-18 PROCEDURE — G0008 ADMIN INFLUENZA VIRUS VAC: HCPCS | Performed by: INTERNAL MEDICINE

## 2018-09-18 ASSESSMENT — PATIENT HEALTH QUESTIONNAIRE - PHQ9
1. LITTLE INTEREST OR PLEASURE IN DOING THINGS: 0
2. FEELING DOWN, DEPRESSED OR HOPELESS: 0
SUM OF ALL RESPONSES TO PHQ QUESTIONS 1-9: 0
SUM OF ALL RESPONSES TO PHQ9 QUESTIONS 1 & 2: 0
SUM OF ALL RESPONSES TO PHQ QUESTIONS 1-9: 0

## 2018-09-18 ASSESSMENT — ENCOUNTER SYMPTOMS
CHEST TIGHTNESS: 0
EYES NEGATIVE: 1
ABDOMINAL DISTENTION: 0
CONSTIPATION: 0
WHEEZING: 0
SHORTNESS OF BREATH: 0
ALLERGIC/IMMUNOLOGIC NEGATIVE: 1

## 2018-09-18 NOTE — PROGRESS NOTES
Subjective:      Patient ID: Criselda Amaya is a 75 y.o. female. 9/18/18 Patient presents with:  Hypertension  Back Pain: chronic ; rt side  Depression: concerned about not loosing wt     No further rectal bleed         Last seen  8/15/18 Patient presents with:  Rectal Bleeding  First time it was a lot. Second time only a little . No pain   Anxiety: better   Hypertension: taking meds reg           Last seen  7/26/18 Patient presents with:  Hypertension: stressed b/e of  'not giving me space '  Headache                        Review of Systems   Constitutional: Negative for activity change, appetite change, chills and fatigue. Flu  Vac  9/18    tdap  10/16    Zostavac 2013 / [place] . [facility] / script 9/18    Pneumonia vac 10/15   HENT:        Dental ex   Reg    Eyes: Negative. Negative for visual disturbance. Eye  Ex  1/18  . Wears Glasses    Respiratory: Negative for chest tightness, shortness of breath and wheezing. Does not smoke . No Etoh . No asthma   Cardiovascular:        HTN > 10 yrs  , No known CAD     Mom had MI in 76s   Gastrointestinal: Negative for abdominal distention and constipation. Upper / Lower Endoscopy 3/2014  . Clean . Good for 10 yrs . Dr Dominga Calderon      Was advised Capsule endoscopy but did not f/u        Endocrine: Negative. No FH of diabetes   Genitourinary: Negative for dysuria and frequency. S/P Hysterectomy . Mammogram 4/18    Daughter 39, with Breast Cancer   Musculoskeletal: Negative for joint swelling. DEXA 11/13 . No osteopenia    S/P Rt knee replac 4/10/17 . S/P Prednisone    Left knee 2009    Allergic/Immunologic: Negative. Negative for food allergies. Psychiatric/Behavioral: Positive for dysphoric mood. Negative for sleep disturbance. Objective:   Physical Exam   Constitutional: She is oriented to person, place, and time. No distress. Eyes: Conjunctivae are normal.   Neck: Neck supple. No neck rigidity.

## 2018-09-26 ENCOUNTER — TELEPHONE (OUTPATIENT)
Dept: PRIMARY CARE CLINIC | Age: 68
End: 2018-09-26

## 2018-12-07 ENCOUNTER — OFFICE VISIT (OUTPATIENT)
Dept: PSYCHIATRY | Age: 68
End: 2018-12-07
Payer: MEDICARE

## 2018-12-07 VITALS
DIASTOLIC BLOOD PRESSURE: 70 MMHG | BODY MASS INDEX: 32.49 KG/M2 | SYSTOLIC BLOOD PRESSURE: 152 MMHG | HEART RATE: 64 BPM | WEIGHT: 195 LBS | HEIGHT: 65 IN

## 2018-12-07 DIAGNOSIS — F41.9 ANXIETY: ICD-10-CM

## 2018-12-07 DIAGNOSIS — F32.1 CURRENT MODERATE EPISODE OF MAJOR DEPRESSIVE DISORDER WITHOUT PRIOR EPISODE (HCC): Primary | ICD-10-CM

## 2018-12-07 PROCEDURE — 99204 OFFICE O/P NEW MOD 45 MIN: CPT | Performed by: NURSE PRACTITIONER

## 2018-12-07 ASSESSMENT — PATIENT HEALTH QUESTIONNAIRE - PHQ9
3. TROUBLE FALLING OR STAYING ASLEEP: 0
2. FEELING DOWN, DEPRESSED OR HOPELESS: 2
SUM OF ALL RESPONSES TO PHQ9 QUESTIONS 1 & 2: 2
5. POOR APPETITE OR OVEREATING: 1
4. FEELING TIRED OR HAVING LITTLE ENERGY: 3
1. LITTLE INTEREST OR PLEASURE IN DOING THINGS: 0
7. TROUBLE CONCENTRATING ON THINGS, SUCH AS READING THE NEWSPAPER OR WATCHING TELEVISION: 0
SUM OF ALL RESPONSES TO PHQ QUESTIONS 1-9: 10
8. MOVING OR SPEAKING SO SLOWLY THAT OTHER PEOPLE COULD HAVE NOTICED. OR THE OPPOSITE, BEING SO FIGETY OR RESTLESS THAT YOU HAVE BEEN MOVING AROUND A LOT MORE THAN USUAL: 0
9. THOUGHTS THAT YOU WOULD BE BETTER OFF DEAD, OR OF HURTING YOURSELF: 1
SUM OF ALL RESPONSES TO PHQ QUESTIONS 1-9: 10
6. FEELING BAD ABOUT YOURSELF - OR THAT YOU ARE A FAILURE OR HAVE LET YOURSELF OR YOUR FAMILY DOWN: 3

## 2018-12-07 ASSESSMENT — ANXIETY QUESTIONNAIRES
1. FEELING NERVOUS, ANXIOUS, OR ON EDGE: 2-OVER HALF THE DAYS
2. NOT BEING ABLE TO STOP OR CONTROL WORRYING: 1-SEVERAL DAYS
3. WORRYING TOO MUCH ABOUT DIFFERENT THINGS: 2-OVER HALF THE DAYS
GAD7 TOTAL SCORE: 10
7. FEELING AFRAID AS IF SOMETHING AWFUL MIGHT HAPPEN: 2-OVER HALF THE DAYS
4. TROUBLE RELAXING: 0-NOT AT ALL SURE
6. BECOMING EASILY ANNOYED OR IRRITABLE: 3-NEARLY EVERY DAY
5. BEING SO RESTLESS THAT IT IS HARD TO SIT STILL: 0-NOT AT ALL SURE

## 2018-12-07 NOTE — PROGRESS NOTES
was schizophrenic. Never got along with his family. Anytime she could do something to hurt me she would. MIL never made her feel worthy of her love, no matter what I did for her. None of them have made me feel like that. My  won't stand up for me. FEELS HAS GIVEN SO MUCH OF ME, I'VE ALMOST DISAPPEARED.   I'M TIRED      Psych ROS:     Depression: decreased sleep (middle insomnia, wakes to urinate); doesn't nap, change in appetite (lower), decreased concentration, guilt, hopelessness, helplessness, poor self esteem, difficulty with ADL completion, loss of interest, poor energy, tearful, increased isolation, SI DENIES /HI     Kathleen: insomnia with increased energy, rapid speech, easily distracted or decreased attention, irritability, racing thoughts, expansive mood, increase in energy and goal directed behavior, grandiosity, flight of ideas    Anxiety: constant worry (, health), irritability, sleep disruption, somatic complaints (headaches), restlessness, fatigue; fear of doing/saying wrong things, fear of judgement, avoidant of social situations    OCD:  DENIES Repetitive actions or rituals, excessive hand washing, contamination fears, need for symmetry, violent thoughts, hoarding, fear of being harmed, mental or verbal repetition of words or phrases and counting    Panic:  DENIES Shortness of breath, dizziness, diaphoresis, trembling, palpitations, feeing of choking, nausea, feeling outside of self, numbness, chills, hot flashes, chest discomfort and fear of dying    Psychosis: DENIESA/VH, paranoia, delusions    PTSD: hypervigilance,since 's illness; DENIES nightmares, flashbacks,  easily startled, decreased sleep, reliving the event, avoiding situations that remind you of trauma, physical and mental paralysis when reminded of the experience, same despair, easily angry or irritable, trouble concentrating, fear for safety,  Numbness of emotions, feeling of detachment      TUCKER 7 SCORE 12/7/2018   TUCKER-7 Total Score 10     Interpretation of TUCKER-7 score: 5-9 = mild anxiety, 10-14 = moderate anxiety, 15+ = severe anxiety. Recommend referral to behavioral health for scores 10 or greater. PHQ-9 Total Score: 10 (12/7/2018 10:50 AM)  Interpretation of PHQ-9 score:  1-4 = minimal depression, 5-9 = mild depression, 10-14 = moderate depression; 15-19 = moderately severe depression, 20-27 = severe depression    History obtained from patient and chart (confirmed by patient today).     Past Psychiatric History:    Prior hospitalizations:  denies   Prior diagnoses:  denies   Outpatient Treatment:     Psychiatrist: denies    Therapist: denies   Suicide Attempts: denies   Hx SH:  denies    Past Psychopharmacologic Trials (including response/reactions):    - denies  (was prescribed one time course xanax 0.25mg tabs #30 by pcp 5/30/18, didn't take)      Substance Use History:   Nicotine:   History   Smoking Status    Never Smoker   Smokeless Tobacco    Never Used      Alcohol: social, infrequent on holidays; avoids because parents drank heavily   Illicits: in teenage years used marijuana, denies recent in many years   Caffeine: 2 cups coffee/day   Rehabs/Complicated W/D:   denies    Past Medical/Surgical History:   Past Medical History:   Diagnosis Date    Acid reflux     Headache(784.0)     Hyperlipidemia     Hypertension     Osteoarthritis      Past Surgical History:   Procedure Laterality Date    ANKLE SURGERY      bone spur removed from achilles tendon  in  [facility]      COLONOSCOPY      HYSTERECTOMY      JOINT REPLACEMENT Left     TKR 2008    KNEE ARTHROSCOPY      both knee    KNEE SURGERY      knee replacement in the left knee    OTHER SURGICAL HISTORY  01/23/2017    excision of back mass         PCP: Yasmeen Hartley MD      Social/Developmental History:    Developmental: Born and raised/upbringing:  Parents not , raised by mother   Marital: x 52 195 lb (88.5 kg)   09/18/18 194 lb (88 kg)   08/15/18 195 lb (88.5 kg)           ROS: Denies trouble with fever, rash, headache, vision changes, chest pain, shortness of breath, nausea, extremity pain, weakness, dysuria. Mental Status Exam:     Appearance    alert, cooperative, crying, appropriate dress for season, well groomed, appears stated age  Muscle strength/tone: no atrophy or abnormal movements  Gait/station: normal  Speech    spontaneous, normal rate and normal volume  Mood    Anxious  Depressed  Worthless  Low self-esteem  Emptiness  [place]  Demoralization  Affect    depressed affect Congruent to thought content and mood  Thought Content    hopelessness, helplessness, worthlessness, cognitive distortions and all or nothing thinking, no delusions voiced  Thought Process    coherent   Associations    logical connections  Perceptions: denies AH/VH, does not appear preoccupied with the internal environment  Insight    Fair  Judgment    Intact  Orientation    oriented to person, place, time, and general circumstances  Memory    recent and remote memory intact  Attention/Concentration    intact  Ability to understand instructions Yes  Ability to respond meaningfully Yes  Language: [address] [place] [address] knowledge/Intellect: Average  SI:   suicidal ideation with no plan or intent  HI: Denies HI    Labs:   Lab Review   No visits with results within 6 Month(s) from this visit. Latest known visit with results is:   Hospital Outpatient Visit on 06/01/2018   Component Date Value    POC Creatinine 06/01/2018 1.1     GFR Non- 06/01/2018 49*    GFR  06/01/2018 60*    Sample Type 06/01/2018 YAMEL     Performed on 06/01/2018 SEE BELOW            Last Drug screen:   No results found for: Yadi Rings, LABBENZ, COCAIMETSCRU, THC, MDMA, LABMETH, OPIATESCREENURINE, OXTCOSU, PHENCYCLIDINESCREENURINE, PROPOXYPHENE, METAMPU        Imaging:   CT head wo contrast  6/2018: Impression   1. No acute intracranial abnormality. ASSESSMENT AND PLAN     Diagnosis Orders   1. Current moderate episode of major depressive disorder without prior episode ([facility] [address]     2. Anxiety             1. Safety: NO Imminent risk of danger to/self/others based on the factors considered below. Appropriate for outpatient level of care. Safety plan includes: 911, PES, hotlines, and interventions discussed today. Risk factors: Age <25 or >55, depressed mood, suicidal ideation, social isolation, no outpatient services in place,  and no collateral information to support safety. Protective factors:  female gender,  does not have lethal plan, does not have access to guns or weapons, patient is [place] for safety, no prior suicide attempts, no family h/o suicide, no substance abuse,  no active psychosis or cognitive dysfunction, and patient is future oriented. 2. Psychiatric  - declined medication. Discussed would recommend ssri for mood/anxiety sx. Pt prefers to avoid meds    - encouraged good self care. Regular meals and physical activity. Engage in activities that pt enjoys. Support pt visiting son and his family in [place] over Yue; encourage positive self-talk and re-framing negative thoughts    -Labs: reviewed in Epic, up to date 3/2018  -Recommend outpt therapy. Would likely benefit from CBT. Pt prefers to follow with this provider for now. Due to high co-pays ($45) on fixed income, agrees to meet monthly. -OARRS reviewed, c/w history  -R/b/se/a d/w pt who consents. 3. Medical  -Following with Hector Michel MD    4. Substance   -No active issues. 5. RTC -4  Weeks, call sooner if needed    [employer], CNP  Psychiatric Nurse Practitioner         [address]  [phone])    National Suicide Prevention Lifeline  (179) 453-TALK (8398)  www.suicidepreventionlifeline. [employer] [address] (PES): [address]  [address] [facility], [address] [place] [address]    Mobile Crisis Team: 276.641.6665      Text Support  Text 380546 \"connect\" if suicidal for contact via text or phone call

## 2018-12-17 DIAGNOSIS — K21.9 GASTROESOPHAGEAL REFLUX DISEASE WITHOUT ESOPHAGITIS: ICD-10-CM

## 2018-12-18 RX ORDER — ESOMEPRAZOLE MAGNESIUM 40 MG/1
CAPSULE, DELAYED RELEASE ORAL
Qty: 30 CAPSULE | Refills: 2 | Status: SHIPPED | OUTPATIENT
Start: 2018-12-18 | End: 2019-06-04 | Stop reason: SDUPTHER

## 2018-12-19 ENCOUNTER — TELEPHONE (OUTPATIENT)
Dept: PRIMARY CARE CLINIC | Age: 68
End: 2018-12-19

## 2019-01-15 ENCOUNTER — OFFICE VISIT (OUTPATIENT)
Dept: PSYCHIATRY | Age: 69
End: 2019-01-15
Payer: MEDICARE

## 2019-01-15 VITALS
BODY MASS INDEX: 31.72 KG/M2 | SYSTOLIC BLOOD PRESSURE: 133 MMHG | HEIGHT: 65 IN | WEIGHT: 190.4 LBS | HEART RATE: 78 BPM | DIASTOLIC BLOOD PRESSURE: 72 MMHG

## 2019-01-15 DIAGNOSIS — F32.1 CURRENT MODERATE EPISODE OF MAJOR DEPRESSIVE DISORDER WITHOUT PRIOR EPISODE (HCC): Primary | ICD-10-CM

## 2019-01-15 DIAGNOSIS — F41.9 ANXIETY: ICD-10-CM

## 2019-01-15 PROCEDURE — 99214 OFFICE O/P EST MOD 30 MIN: CPT | Performed by: NURSE PRACTITIONER

## 2019-01-15 ASSESSMENT — ANXIETY QUESTIONNAIRES
5. BEING SO RESTLESS THAT IT IS HARD TO SIT STILL: 0-NOT AT ALL SURE
2. NOT BEING ABLE TO STOP OR CONTROL WORRYING: 1-SEVERAL DAYS
GAD7 TOTAL SCORE: 5
1. FEELING NERVOUS, ANXIOUS, OR ON EDGE: 1-SEVERAL DAYS
7. FEELING AFRAID AS IF SOMETHING AWFUL MIGHT HAPPEN: 0-NOT AT ALL SURE
6. BECOMING EASILY ANNOYED OR IRRITABLE: 2-OVER HALF THE DAYS
4. TROUBLE RELAXING: 0-NOT AT ALL SURE
3. WORRYING TOO MUCH ABOUT DIFFERENT THINGS: 1-SEVERAL DAYS

## 2019-01-15 ASSESSMENT — PATIENT HEALTH QUESTIONNAIRE - PHQ9
3. TROUBLE FALLING OR STAYING ASLEEP: 0
4. FEELING TIRED OR HAVING LITTLE ENERGY: 1
SUM OF ALL RESPONSES TO PHQ QUESTIONS 1-9: 3
1. LITTLE INTEREST OR PLEASURE IN DOING THINGS: 0
SUM OF ALL RESPONSES TO PHQ9 QUESTIONS 1 & 2: 1
7. TROUBLE CONCENTRATING ON THINGS, SUCH AS READING THE NEWSPAPER OR WATCHING TELEVISION: 0
10. IF YOU CHECKED OFF ANY PROBLEMS, HOW DIFFICULT HAVE THESE PROBLEMS MADE IT FOR YOU TO DO YOUR WORK, TAKE CARE OF THINGS AT HOME, OR GET ALONG WITH OTHER PEOPLE: 0
6. FEELING BAD ABOUT YOURSELF - OR THAT YOU ARE A FAILURE OR HAVE LET YOURSELF OR YOUR FAMILY DOWN: 1
5. POOR APPETITE OR OVEREATING: 0
2. FEELING DOWN, DEPRESSED OR HOPELESS: 1
8. MOVING OR SPEAKING SO SLOWLY THAT OTHER PEOPLE COULD HAVE NOTICED. OR THE OPPOSITE, BEING SO FIGETY OR RESTLESS THAT YOU HAVE BEEN MOVING AROUND A LOT MORE THAN USUAL: 0
SUM OF ALL RESPONSES TO PHQ QUESTIONS 1-9: 3
9. THOUGHTS THAT YOU WOULD BE BETTER OFF DEAD, OR OF HURTING YOURSELF: 0

## 2019-01-29 ENCOUNTER — OFFICE VISIT (OUTPATIENT)
Dept: PRIMARY CARE CLINIC | Age: 69
End: 2019-01-29
Payer: MEDICARE

## 2019-01-29 VITALS
BODY MASS INDEX: 31.99 KG/M2 | TEMPERATURE: 97.8 F | HEART RATE: 68 BPM | WEIGHT: 192 LBS | DIASTOLIC BLOOD PRESSURE: 70 MMHG | SYSTOLIC BLOOD PRESSURE: 120 MMHG | HEIGHT: 65 IN | OXYGEN SATURATION: 98 %

## 2019-01-29 DIAGNOSIS — I10 ESSENTIAL HYPERTENSION: ICD-10-CM

## 2019-01-29 DIAGNOSIS — N39.0 URINARY TRACT INFECTION WITH HEMATURIA, SITE UNSPECIFIED: ICD-10-CM

## 2019-01-29 DIAGNOSIS — R31.9 URINARY TRACT INFECTION WITH HEMATURIA, SITE UNSPECIFIED: ICD-10-CM

## 2019-01-29 DIAGNOSIS — N39.0 URINARY TRACT INFECTION WITH HEMATURIA, SITE UNSPECIFIED: Primary | ICD-10-CM

## 2019-01-29 DIAGNOSIS — R31.9 URINARY TRACT INFECTION WITH HEMATURIA, SITE UNSPECIFIED: Primary | ICD-10-CM

## 2019-01-29 LAB
BACTERIA: ABNORMAL /HPF
BILIRUBIN URINE: ABNORMAL
BLOOD, URINE: ABNORMAL
CLARITY: ABNORMAL
COLOR: ABNORMAL
EPITHELIAL CELLS, UA: 7 /HPF (ref 0–5)
GLUCOSE URINE: NEGATIVE MG/DL
HYALINE CASTS: 2 /LPF (ref 0–8)
KETONES, URINE: NEGATIVE MG/DL
LEUKOCYTE ESTERASE, URINE: ABNORMAL
MICROSCOPIC EXAMINATION: YES
NITRITE, URINE: NEGATIVE
PH UA: 5.5
PROTEIN UA: 100 MG/DL
RBC UA: 201 /HPF (ref 0–4)
SPECIFIC GRAVITY UA: 1.03
URINE TYPE: ABNORMAL
UROBILINOGEN, URINE: 0.2 E.U./DL
WBC UA: 418 /HPF (ref 0–5)

## 2019-01-29 PROCEDURE — 99213 OFFICE O/P EST LOW 20 MIN: CPT | Performed by: INTERNAL MEDICINE

## 2019-01-29 RX ORDER — SULFAMETHOXAZOLE AND TRIMETHOPRIM 400; 80 MG/1; MG/1
1 TABLET ORAL 2 TIMES DAILY
Qty: 10 TABLET | Refills: 0 | Status: SHIPPED | OUTPATIENT
Start: 2019-01-29 | End: 2019-02-03

## 2019-01-29 RX ORDER — SULFAMETHOXAZOLE AND TRIMETHOPRIM 400; 80 MG/1; MG/1
1 TABLET ORAL DAILY
Qty: 10 TABLET | Refills: 0 | Status: SHIPPED | OUTPATIENT
Start: 2019-01-29 | End: 2019-01-29 | Stop reason: SDUPTHER

## 2019-01-29 ASSESSMENT — ENCOUNTER SYMPTOMS
CHEST TIGHTNESS: 0
CONSTIPATION: 0
EYES NEGATIVE: 1
ALLERGIC/IMMUNOLOGIC NEGATIVE: 1
SHORTNESS OF BREATH: 0
WHEEZING: 0
ABDOMINAL DISTENTION: 0

## 2019-03-04 DIAGNOSIS — I10 ESSENTIAL HYPERTENSION: ICD-10-CM

## 2019-03-04 RX ORDER — LABETALOL 100 MG/1
TABLET, FILM COATED ORAL
Qty: 60 TABLET | Refills: 5 | Status: SHIPPED | OUTPATIENT
Start: 2019-03-04 | End: 2019-09-08 | Stop reason: SDUPTHER

## 2019-03-26 ENCOUNTER — OFFICE VISIT (OUTPATIENT)
Dept: PRIMARY CARE CLINIC | Age: 69
End: 2019-03-26
Payer: MEDICARE

## 2019-03-26 VITALS
BODY MASS INDEX: 31.42 KG/M2 | OXYGEN SATURATION: 99 % | HEART RATE: 70 BPM | TEMPERATURE: 98.1 F | RESPIRATION RATE: 18 BRPM | HEIGHT: 65 IN | WEIGHT: 188.6 LBS | SYSTOLIC BLOOD PRESSURE: 130 MMHG | DIASTOLIC BLOOD PRESSURE: 70 MMHG

## 2019-03-26 DIAGNOSIS — Z23 NEED FOR PNEUMOCOCCAL VACCINATION: ICD-10-CM

## 2019-03-26 DIAGNOSIS — Z12.31 ENCOUNTER FOR SCREENING MAMMOGRAM FOR HIGH-RISK PATIENT: ICD-10-CM

## 2019-03-26 DIAGNOSIS — Z00.00 PHYSICAL EXAM: ICD-10-CM

## 2019-03-26 DIAGNOSIS — E55.9 VITAMIN D DEFICIENCY: ICD-10-CM

## 2019-03-26 DIAGNOSIS — J06.9 UPPER RESPIRATORY TRACT INFECTION, UNSPECIFIED TYPE: ICD-10-CM

## 2019-03-26 LAB
A/G RATIO: 1.1 (ref 1.1–2.2)
ALBUMIN SERPL-MCNC: 4.1 G/DL (ref 3.4–5)
ALP BLD-CCNC: 115 U/L (ref 40–129)
ALT SERPL-CCNC: 25 U/L (ref 10–40)
ANION GAP SERPL CALCULATED.3IONS-SCNC: 13 MMOL/L (ref 3–16)
AST SERPL-CCNC: 23 U/L (ref 15–37)
BASOPHILS ABSOLUTE: 0 K/UL (ref 0–0.2)
BASOPHILS RELATIVE PERCENT: 0.6 %
BILIRUB SERPL-MCNC: <0.2 MG/DL (ref 0–1)
BILIRUBIN URINE: NEGATIVE
BLOOD, URINE: NEGATIVE
BUN BLDV-MCNC: 16 MG/DL (ref 7–20)
CALCIUM SERPL-MCNC: 10.2 MG/DL (ref 8.3–10.6)
CHLORIDE BLD-SCNC: 102 MMOL/L (ref 99–110)
CHOLESTEROL, TOTAL: 186 MG/DL (ref 0–199)
CLARITY: CLEAR
CO2: 29 MMOL/L (ref 21–32)
COLOR: YELLOW
CREAT SERPL-MCNC: 0.8 MG/DL (ref 0.6–1.2)
EOSINOPHILS ABSOLUTE: 0.2 K/UL (ref 0–0.6)
EOSINOPHILS RELATIVE PERCENT: 2.8 %
GFR AFRICAN AMERICAN: >60
GFR NON-AFRICAN AMERICAN: >60
GLOBULIN: 3.7 G/DL
GLUCOSE BLD-MCNC: 106 MG/DL (ref 70–99)
GLUCOSE URINE: NEGATIVE MG/DL
HCT VFR BLD CALC: 36.7 % (ref 36–48)
HDLC SERPL-MCNC: 55 MG/DL (ref 40–60)
HEMOGLOBIN: 12.1 G/DL (ref 12–16)
KETONES, URINE: NEGATIVE MG/DL
LDL CHOLESTEROL CALCULATED: 105 MG/DL
LEUKOCYTE ESTERASE, URINE: NEGATIVE
LYMPHOCYTES ABSOLUTE: 3.1 K/UL (ref 1–5.1)
LYMPHOCYTES RELATIVE PERCENT: 41.1 %
MCH RBC QN AUTO: 30.5 PG (ref 26–34)
MCHC RBC AUTO-ENTMCNC: 32.9 G/DL (ref 31–36)
MCV RBC AUTO: 92.7 FL (ref 80–100)
MICROSCOPIC EXAMINATION: NORMAL
MONOCYTES ABSOLUTE: 0.8 K/UL (ref 0–1.3)
MONOCYTES RELATIVE PERCENT: 10.3 %
NEUTROPHILS ABSOLUTE: 3.5 K/UL (ref 1.7–7.7)
NEUTROPHILS RELATIVE PERCENT: 45.2 %
NITRITE, URINE: NEGATIVE
PDW BLD-RTO: 14.1 % (ref 12.4–15.4)
PH UA: 6.5 (ref 5–8)
PLATELET # BLD: 393 K/UL (ref 135–450)
PMV BLD AUTO: 7.7 FL (ref 5–10.5)
POTASSIUM SERPL-SCNC: 4.8 MMOL/L (ref 3.5–5.1)
PROTEIN UA: NEGATIVE MG/DL
RBC # BLD: 3.96 M/UL (ref 4–5.2)
SODIUM BLD-SCNC: 144 MMOL/L (ref 136–145)
SPECIFIC GRAVITY UA: 1.02 (ref 1–1.03)
TOTAL PROTEIN: 7.8 G/DL (ref 6.4–8.2)
TRIGL SERPL-MCNC: 128 MG/DL (ref 0–150)
TSH SERPL DL<=0.05 MIU/L-ACNC: 1.88 UIU/ML (ref 0.27–4.2)
URINE TYPE: NORMAL
UROBILINOGEN, URINE: 0.2 E.U./DL
VITAMIN D 25-HYDROXY: 33.8 NG/ML
VLDLC SERPL CALC-MCNC: 26 MG/DL
WBC # BLD: 7.7 K/UL (ref 4–11)

## 2019-03-26 PROCEDURE — 99214 OFFICE O/P EST MOD 30 MIN: CPT | Performed by: INTERNAL MEDICINE

## 2019-03-26 RX ORDER — AMOXICILLIN 500 MG/1
500 CAPSULE ORAL 3 TIMES DAILY
Qty: 30 CAPSULE | Refills: 0 | Status: SHIPPED | OUTPATIENT
Start: 2019-03-26 | End: 2019-04-05

## 2019-03-26 ASSESSMENT — ENCOUNTER SYMPTOMS
ALLERGIC/IMMUNOLOGIC NEGATIVE: 1
CONSTIPATION: 0
WHEEZING: 0
CHEST TIGHTNESS: 0
EYES NEGATIVE: 1
SORE THROAT: 1
SHORTNESS OF BREATH: 0
ABDOMINAL DISTENTION: 0

## 2019-03-26 ASSESSMENT — PATIENT HEALTH QUESTIONNAIRE - PHQ9
SUM OF ALL RESPONSES TO PHQ QUESTIONS 1-9: 0
2. FEELING DOWN, DEPRESSED OR HOPELESS: 0
SUM OF ALL RESPONSES TO PHQ QUESTIONS 1-9: 0
SUM OF ALL RESPONSES TO PHQ9 QUESTIONS 1 & 2: 0
1. LITTLE INTEREST OR PLEASURE IN DOING THINGS: 0

## 2019-03-27 LAB
ESTIMATED AVERAGE GLUCOSE: 131.2 MG/DL
HBA1C MFR BLD: 6.2 %

## 2019-04-16 ENCOUNTER — OFFICE VISIT (OUTPATIENT)
Dept: PSYCHIATRY | Age: 69
End: 2019-04-16
Payer: MEDICARE

## 2019-04-16 VITALS
DIASTOLIC BLOOD PRESSURE: 77 MMHG | BODY MASS INDEX: 31.36 KG/M2 | SYSTOLIC BLOOD PRESSURE: 137 MMHG | HEART RATE: 80 BPM | HEIGHT: 65 IN | WEIGHT: 188.2 LBS

## 2019-04-16 DIAGNOSIS — F32.1 CURRENT MODERATE EPISODE OF MAJOR DEPRESSIVE DISORDER WITHOUT PRIOR EPISODE (HCC): Primary | ICD-10-CM

## 2019-04-16 DIAGNOSIS — F41.9 ANXIETY: ICD-10-CM

## 2019-04-16 PROCEDURE — 99214 OFFICE O/P EST MOD 30 MIN: CPT | Performed by: NURSE PRACTITIONER

## 2019-04-16 ASSESSMENT — ANXIETY QUESTIONNAIRES
6. BECOMING EASILY ANNOYED OR IRRITABLE: 2-OVER HALF THE DAYS
GAD7 TOTAL SCORE: 2
3. WORRYING TOO MUCH ABOUT DIFFERENT THINGS: 0-NOT AT ALL SURE
2. NOT BEING ABLE TO STOP OR CONTROL WORRYING: 0-NOT AT ALL SURE
4. TROUBLE RELAXING: 0-NOT AT ALL SURE
7. FEELING AFRAID AS IF SOMETHING AWFUL MIGHT HAPPEN: 0-NOT AT ALL SURE
5. BEING SO RESTLESS THAT IT IS HARD TO SIT STILL: 0-NOT AT ALL SURE
1. FEELING NERVOUS, ANXIOUS, OR ON EDGE: 0-NOT AT ALL SURE

## 2019-04-16 ASSESSMENT — PATIENT HEALTH QUESTIONNAIRE - PHQ9
6. FEELING BAD ABOUT YOURSELF - OR THAT YOU ARE A FAILURE OR HAVE LET YOURSELF OR YOUR FAMILY DOWN: 0
9. THOUGHTS THAT YOU WOULD BE BETTER OFF DEAD, OR OF HURTING YOURSELF: 0
10. IF YOU CHECKED OFF ANY PROBLEMS, HOW DIFFICULT HAVE THESE PROBLEMS MADE IT FOR YOU TO DO YOUR WORK, TAKE CARE OF THINGS AT HOME, OR GET ALONG WITH OTHER PEOPLE: 0
3. TROUBLE FALLING OR STAYING ASLEEP: 0
SUM OF ALL RESPONSES TO PHQ QUESTIONS 1-9: 0
4. FEELING TIRED OR HAVING LITTLE ENERGY: 0
7. TROUBLE CONCENTRATING ON THINGS, SUCH AS READING THE NEWSPAPER OR WATCHING TELEVISION: 0
5. POOR APPETITE OR OVEREATING: 0
SUM OF ALL RESPONSES TO PHQ QUESTIONS 1-9: 0
8. MOVING OR SPEAKING SO SLOWLY THAT OTHER PEOPLE COULD HAVE NOTICED. OR THE OPPOSITE, BEING SO FIGETY OR RESTLESS THAT YOU HAVE BEEN MOVING AROUND A LOT MORE THAN USUAL: 0
2. FEELING DOWN, DEPRESSED OR HOPELESS: 0
1. LITTLE INTEREST OR PLEASURE IN DOING THINGS: 0
SUM OF ALL RESPONSES TO PHQ9 QUESTIONS 1 & 2: 0

## 2019-05-21 ENCOUNTER — NURSE ONLY (OUTPATIENT)
Dept: PRIMARY CARE CLINIC | Age: 69
End: 2019-05-21
Payer: MEDICARE

## 2019-05-21 DIAGNOSIS — Z23 NEED FOR PNEUMOCOCCAL VACCINATION: Primary | ICD-10-CM

## 2019-05-21 PROCEDURE — 90732 PPSV23 VACC 2 YRS+ SUBQ/IM: CPT | Performed by: INTERNAL MEDICINE

## 2019-05-21 PROCEDURE — G0009 ADMIN PNEUMOCOCCAL VACCINE: HCPCS | Performed by: INTERNAL MEDICINE

## 2019-06-04 DIAGNOSIS — K21.9 GASTROESOPHAGEAL REFLUX DISEASE WITHOUT ESOPHAGITIS: ICD-10-CM

## 2019-06-05 RX ORDER — ESOMEPRAZOLE MAGNESIUM 40 MG/1
CAPSULE, DELAYED RELEASE ORAL
Qty: 30 CAPSULE | Refills: 1 | Status: SHIPPED | OUTPATIENT
Start: 2019-06-05 | End: 2019-10-08 | Stop reason: SDUPTHER

## 2019-07-10 DIAGNOSIS — I10 ESSENTIAL HYPERTENSION: ICD-10-CM

## 2019-07-10 RX ORDER — HYDROCHLOROTHIAZIDE 25 MG/1
TABLET ORAL
Qty: 90 TABLET | Refills: 5 | Status: SHIPPED | OUTPATIENT
Start: 2019-07-10 | End: 2019-10-15 | Stop reason: SDUPTHER

## 2019-09-08 DIAGNOSIS — I10 ESSENTIAL HYPERTENSION: ICD-10-CM

## 2019-09-11 RX ORDER — LABETALOL 100 MG/1
TABLET, FILM COATED ORAL
Qty: 60 TABLET | Refills: 3 | Status: SHIPPED | OUTPATIENT
Start: 2019-09-11 | End: 2019-10-15 | Stop reason: SDUPTHER

## 2019-10-03 ENCOUNTER — NURSE ONLY (OUTPATIENT)
Dept: PRIMARY CARE CLINIC | Age: 69
End: 2019-10-03
Payer: MEDICARE

## 2019-10-03 DIAGNOSIS — Z23 NEEDS FLU SHOT: Primary | ICD-10-CM

## 2019-10-03 PROCEDURE — G0008 ADMIN INFLUENZA VIRUS VAC: HCPCS | Performed by: INTERNAL MEDICINE

## 2019-10-03 PROCEDURE — 90653 IIV ADJUVANT VACCINE IM: CPT | Performed by: INTERNAL MEDICINE

## 2019-10-08 DIAGNOSIS — K21.9 GASTROESOPHAGEAL REFLUX DISEASE WITHOUT ESOPHAGITIS: ICD-10-CM

## 2019-10-08 RX ORDER — ESOMEPRAZOLE MAGNESIUM 40 MG/1
CAPSULE, DELAYED RELEASE ORAL
Qty: 30 CAPSULE | Refills: 0 | Status: SHIPPED | OUTPATIENT
Start: 2019-10-08 | End: 2019-12-13 | Stop reason: SDUPTHER

## 2019-10-15 ENCOUNTER — OFFICE VISIT (OUTPATIENT)
Dept: PRIMARY CARE CLINIC | Age: 69
End: 2019-10-15
Payer: MEDICARE

## 2019-10-15 VITALS
HEART RATE: 63 BPM | SYSTOLIC BLOOD PRESSURE: 140 MMHG | WEIGHT: 185 LBS | HEIGHT: 65 IN | DIASTOLIC BLOOD PRESSURE: 80 MMHG | BODY MASS INDEX: 30.82 KG/M2

## 2019-10-15 DIAGNOSIS — I10 ESSENTIAL HYPERTENSION: ICD-10-CM

## 2019-10-15 DIAGNOSIS — E78.5 HYPERLIPIDEMIA, UNSPECIFIED HYPERLIPIDEMIA TYPE: ICD-10-CM

## 2019-10-15 DIAGNOSIS — S93.409A RUPTURE OF LIGAMENT OF ANKLE, INITIAL ENCOUNTER: ICD-10-CM

## 2019-10-15 PROCEDURE — 99213 OFFICE O/P EST LOW 20 MIN: CPT | Performed by: INTERNAL MEDICINE

## 2019-10-15 RX ORDER — HYDROCHLOROTHIAZIDE 25 MG/1
25 TABLET ORAL DAILY
Qty: 90 TABLET | Refills: 5 | Status: SHIPPED | OUTPATIENT
Start: 2019-10-15 | End: 2019-12-19 | Stop reason: SDUPTHER

## 2019-10-15 RX ORDER — SIMVASTATIN 20 MG
20 TABLET ORAL NIGHTLY
Qty: 90 TABLET | Refills: 5 | Status: SHIPPED | OUTPATIENT
Start: 2019-10-15 | End: 2020-06-30 | Stop reason: SDUPTHER

## 2019-10-15 RX ORDER — LABETALOL 100 MG/1
100 TABLET, FILM COATED ORAL 2 TIMES DAILY
Qty: 180 TABLET | Refills: 5 | Status: SHIPPED | OUTPATIENT
Start: 2019-10-15 | End: 2019-12-19 | Stop reason: SDUPTHER

## 2019-10-15 ASSESSMENT — ENCOUNTER SYMPTOMS
SHORTNESS OF BREATH: 0
CONSTIPATION: 0
CHEST TIGHTNESS: 0
EYES NEGATIVE: 1
WHEEZING: 0
ABDOMINAL DISTENTION: 0
ALLERGIC/IMMUNOLOGIC NEGATIVE: 1

## 2019-12-13 DIAGNOSIS — K21.9 GASTROESOPHAGEAL REFLUX DISEASE WITHOUT ESOPHAGITIS: ICD-10-CM

## 2019-12-16 RX ORDER — ESOMEPRAZOLE MAGNESIUM 40 MG/1
CAPSULE, DELAYED RELEASE ORAL
Qty: 30 CAPSULE | Refills: 0 | Status: SHIPPED | OUTPATIENT
Start: 2019-12-16 | End: 2020-02-12

## 2019-12-19 ENCOUNTER — TELEPHONE (OUTPATIENT)
Dept: PRIMARY CARE CLINIC | Age: 69
End: 2019-12-19

## 2019-12-19 ENCOUNTER — OFFICE VISIT (OUTPATIENT)
Dept: PRIMARY CARE CLINIC | Age: 69
End: 2019-12-19
Payer: MEDICARE

## 2019-12-19 VITALS
SYSTOLIC BLOOD PRESSURE: 140 MMHG | BODY MASS INDEX: 30.82 KG/M2 | WEIGHT: 185 LBS | HEART RATE: 72 BPM | HEIGHT: 65 IN | DIASTOLIC BLOOD PRESSURE: 80 MMHG

## 2019-12-19 DIAGNOSIS — I10 ESSENTIAL HYPERTENSION: ICD-10-CM

## 2019-12-19 DIAGNOSIS — J06.9 URI, ACUTE: Primary | ICD-10-CM

## 2019-12-19 PROCEDURE — 99214 OFFICE O/P EST MOD 30 MIN: CPT | Performed by: INTERNAL MEDICINE

## 2019-12-19 RX ORDER — LABETALOL 100 MG/1
100 TABLET, FILM COATED ORAL 2 TIMES DAILY
Qty: 180 TABLET | Refills: 5 | Status: SHIPPED | OUTPATIENT
Start: 2019-12-19 | End: 2020-06-30 | Stop reason: SDUPTHER

## 2019-12-19 RX ORDER — GUAIFENESIN 600 MG/1
600 TABLET, EXTENDED RELEASE ORAL 2 TIMES DAILY
Qty: 14 TABLET | Refills: 0 | Status: SHIPPED | OUTPATIENT
Start: 2019-12-19 | End: 2019-12-26

## 2019-12-19 RX ORDER — AMOXICILLIN 500 MG/1
500 CAPSULE ORAL 3 TIMES DAILY
Qty: 21 CAPSULE | Refills: 0 | Status: SHIPPED | OUTPATIENT
Start: 2019-12-19 | End: 2019-12-26

## 2019-12-19 RX ORDER — FEXOFENADINE HCL 180 MG/1
180 TABLET ORAL DAILY PRN
COMMUNITY

## 2019-12-19 RX ORDER — FLUTICASONE PROPIONATE 50 MCG
1 SPRAY, SUSPENSION (ML) NASAL DAILY PRN
COMMUNITY

## 2019-12-19 RX ORDER — HYDROCHLOROTHIAZIDE 25 MG/1
25 TABLET ORAL DAILY
Qty: 90 TABLET | Refills: 5 | Status: SHIPPED | OUTPATIENT
Start: 2019-12-19 | End: 2020-06-30 | Stop reason: SDUPTHER

## 2019-12-19 ASSESSMENT — ENCOUNTER SYMPTOMS
CHEST TIGHTNESS: 0
CONSTIPATION: 0
WHEEZING: 0
ABDOMINAL DISTENTION: 0
SHORTNESS OF BREATH: 0
EYES NEGATIVE: 1
SORE THROAT: 1
ALLERGIC/IMMUNOLOGIC NEGATIVE: 1

## 2020-02-12 RX ORDER — ESOMEPRAZOLE MAGNESIUM 40 MG/1
CAPSULE, DELAYED RELEASE ORAL
Qty: 30 CAPSULE | Refills: 5 | Status: SHIPPED | OUTPATIENT
Start: 2020-02-12 | End: 2020-06-30 | Stop reason: SDUPTHER

## 2020-06-02 ENCOUNTER — TELEPHONE (OUTPATIENT)
Dept: PRIMARY CARE CLINIC | Age: 70
End: 2020-06-02

## 2020-06-30 ENCOUNTER — OFFICE VISIT (OUTPATIENT)
Dept: PRIMARY CARE CLINIC | Age: 70
End: 2020-06-30
Payer: MEDICARE

## 2020-06-30 VITALS
TEMPERATURE: 97.5 F | DIASTOLIC BLOOD PRESSURE: 65 MMHG | HEIGHT: 65 IN | HEART RATE: 67 BPM | SYSTOLIC BLOOD PRESSURE: 120 MMHG | WEIGHT: 191 LBS | BODY MASS INDEX: 31.82 KG/M2

## 2020-06-30 PROBLEM — F32.1 CURRENT MODERATE EPISODE OF MAJOR DEPRESSIVE DISORDER WITHOUT PRIOR EPISODE (HCC): Status: ACTIVE | Noted: 2020-06-30

## 2020-06-30 PROCEDURE — G0438 PPPS, INITIAL VISIT: HCPCS | Performed by: INTERNAL MEDICINE

## 2020-06-30 RX ORDER — SIMVASTATIN 20 MG
20 TABLET ORAL NIGHTLY
Qty: 90 TABLET | Refills: 5 | Status: SHIPPED | OUTPATIENT
Start: 2020-06-30 | End: 2021-01-07 | Stop reason: SDUPTHER

## 2020-06-30 RX ORDER — HYDROCHLOROTHIAZIDE 25 MG/1
25 TABLET ORAL DAILY
Qty: 90 TABLET | Refills: 5 | Status: SHIPPED | OUTPATIENT
Start: 2020-06-30 | End: 2021-01-07 | Stop reason: SDUPTHER

## 2020-06-30 RX ORDER — ESOMEPRAZOLE MAGNESIUM 40 MG/1
40 CAPSULE, DELAYED RELEASE ORAL
Qty: 90 CAPSULE | Refills: 1 | Status: SHIPPED | OUTPATIENT
Start: 2020-06-30 | End: 2021-01-07 | Stop reason: SDUPTHER

## 2020-06-30 RX ORDER — LABETALOL 100 MG/1
100 TABLET, FILM COATED ORAL 2 TIMES DAILY
Qty: 180 TABLET | Refills: 5 | Status: SHIPPED | OUTPATIENT
Start: 2020-06-30 | End: 2021-01-07 | Stop reason: SDUPTHER

## 2020-06-30 ASSESSMENT — ENCOUNTER SYMPTOMS
ABDOMINAL DISTENTION: 0
CHEST TIGHTNESS: 0
SHORTNESS OF BREATH: 0
WHEEZING: 0
ALLERGIC/IMMUNOLOGIC NEGATIVE: 1
CONSTIPATION: 0
EYES NEGATIVE: 1

## 2020-06-30 ASSESSMENT — PATIENT HEALTH QUESTIONNAIRE - PHQ9
SUM OF ALL RESPONSES TO PHQ QUESTIONS 1-9: 0
SUM OF ALL RESPONSES TO PHQ QUESTIONS 1-9: 0

## 2020-06-30 ASSESSMENT — LIFESTYLE VARIABLES: HOW OFTEN DO YOU HAVE A DRINK CONTAINING ALCOHOL: 0

## 2020-09-03 ENCOUNTER — TELEPHONE (OUTPATIENT)
Dept: PRIMARY CARE CLINIC | Age: 70
End: 2020-09-03

## 2020-09-03 NOTE — TELEPHONE ENCOUNTER
----- Message from [place] Katie sent at 9/3/2020 12:34 PM EDT -----  Subject: Message to Provider    QUESTIONS  Information for Provider? patient needs a awv but said [facility] could not   schedule this for docRylee celaya. .. Kat Slaughter please advise   ---------------------------------------------------------------------------  --------------  CALL BACK INFO  What is the best way for the office to contact you? OK to leave message on   voicemail  Preferred Call Back Phone Number? [phone]  ---------------------------------------------------------------------------  --------------  SCRIPT ANSWERS  Relationship to Patient?  Self

## 2020-09-22 ENCOUNTER — TELEPHONE (OUTPATIENT)
Dept: PRIMARY CARE CLINIC | Age: 70
End: 2020-09-22

## 2020-10-05 ENCOUNTER — OFFICE VISIT (OUTPATIENT)
Dept: PRIMARY CARE CLINIC | Age: 70
End: 2020-10-05
Payer: MEDICARE

## 2020-10-05 VITALS
DIASTOLIC BLOOD PRESSURE: 68 MMHG | TEMPERATURE: 96.8 F | SYSTOLIC BLOOD PRESSURE: 130 MMHG | WEIGHT: 197 LBS | HEART RATE: 78 BPM | BODY MASS INDEX: 32.82 KG/M2 | HEIGHT: 65 IN

## 2020-10-05 DIAGNOSIS — E55.9 VITAMIN D DEFICIENCY: ICD-10-CM

## 2020-10-05 DIAGNOSIS — I10 ESSENTIAL HYPERTENSION: ICD-10-CM

## 2020-10-05 DIAGNOSIS — R73.03 PREDIABETES: ICD-10-CM

## 2020-10-05 DIAGNOSIS — E78.5 HYPERLIPIDEMIA, UNSPECIFIED HYPERLIPIDEMIA TYPE: ICD-10-CM

## 2020-10-05 PROBLEM — M25.552 ACUTE HIP PAIN, LEFT: Status: ACTIVE | Noted: 2020-10-05

## 2020-10-05 PROBLEM — Z23 NEED FOR INFLUENZA VACCINATION: Status: ACTIVE | Noted: 2020-10-05

## 2020-10-05 PROBLEM — Z13.1 DIABETES MELLITUS SCREENING: Status: ACTIVE | Noted: 2020-10-05

## 2020-10-05 PROBLEM — E78.00 HIGH CHOLESTEROL: Status: ACTIVE | Noted: 2020-10-05

## 2020-10-05 PROBLEM — Z96.653 STATUS POST TOTAL BILATERAL KNEE REPLACEMENT USING CEMENT: Status: ACTIVE | Noted: 2017-04-20

## 2020-10-05 LAB
A/G RATIO: 1.8 (ref 1.1–2.2)
ALBUMIN SERPL-MCNC: 4.9 G/DL (ref 3.4–5)
ALP BLD-CCNC: 115 U/L (ref 40–129)
ALT SERPL-CCNC: 22 U/L (ref 10–40)
ANION GAP SERPL CALCULATED.3IONS-SCNC: 12 MMOL/L (ref 3–16)
AST SERPL-CCNC: 24 U/L (ref 15–37)
BASOPHILS ABSOLUTE: 0 K/UL (ref 0–0.2)
BASOPHILS RELATIVE PERCENT: 0.5 %
BILIRUB SERPL-MCNC: <0.2 MG/DL (ref 0–1)
BUN BLDV-MCNC: 14 MG/DL (ref 7–20)
CALCIUM SERPL-MCNC: 10.5 MG/DL (ref 8.3–10.6)
CHLORIDE BLD-SCNC: 100 MMOL/L (ref 99–110)
CHOLESTEROL, TOTAL: 188 MG/DL (ref 0–199)
CO2: 30 MMOL/L (ref 21–32)
CREAT SERPL-MCNC: 0.9 MG/DL (ref 0.6–1.2)
CREATININE URINE: 41.4 MG/DL (ref 28–259)
EOSINOPHILS ABSOLUTE: 0.2 K/UL (ref 0–0.6)
EOSINOPHILS RELATIVE PERCENT: 2.2 %
GFR AFRICAN AMERICAN: >60
GFR NON-AFRICAN AMERICAN: >60
GLOBULIN: 2.7 G/DL
GLUCOSE BLD-MCNC: 86 MG/DL (ref 70–99)
HCT VFR BLD CALC: 35.2 % (ref 36–48)
HDLC SERPL-MCNC: 63 MG/DL (ref 40–60)
HEMOGLOBIN: 11.8 G/DL (ref 12–16)
LDL CHOLESTEROL CALCULATED: 90 MG/DL
LYMPHOCYTES ABSOLUTE: 3.3 K/UL (ref 1–5.1)
LYMPHOCYTES RELATIVE PERCENT: 47.9 %
MCH RBC QN AUTO: 30.7 PG (ref 26–34)
MCHC RBC AUTO-ENTMCNC: 33.5 G/DL (ref 31–36)
MCV RBC AUTO: 91.6 FL (ref 80–100)
MICROALBUMIN UR-MCNC: <1.2 MG/DL
MICROALBUMIN/CREAT UR-RTO: NORMAL MG/G (ref 0–30)
MONOCYTES ABSOLUTE: 0.6 K/UL (ref 0–1.3)
MONOCYTES RELATIVE PERCENT: 8.9 %
NEUTROPHILS ABSOLUTE: 2.8 K/UL (ref 1.7–7.7)
NEUTROPHILS RELATIVE PERCENT: 40.5 %
PDW BLD-RTO: 13.7 % (ref 12.4–15.4)
PLATELET # BLD: 341 K/UL (ref 135–450)
PMV BLD AUTO: 7.5 FL (ref 5–10.5)
POTASSIUM SERPL-SCNC: 3.9 MMOL/L (ref 3.5–5.1)
RBC # BLD: 3.84 M/UL (ref 4–5.2)
SODIUM BLD-SCNC: 142 MMOL/L (ref 136–145)
TOTAL PROTEIN: 7.6 G/DL (ref 6.4–8.2)
TRIGL SERPL-MCNC: 173 MG/DL (ref 0–150)
VITAMIN D 25-HYDROXY: 53.3 NG/ML
VLDLC SERPL CALC-MCNC: 35 MG/DL
WBC # BLD: 6.8 K/UL (ref 4–11)

## 2020-10-05 PROCEDURE — 90662 IIV NO PRSV INCREASED AG IM: CPT | Performed by: INTERNAL MEDICINE

## 2020-10-05 PROCEDURE — G0008 ADMIN INFLUENZA VIRUS VAC: HCPCS | Performed by: INTERNAL MEDICINE

## 2020-10-05 PROCEDURE — 99214 OFFICE O/P EST MOD 30 MIN: CPT | Performed by: INTERNAL MEDICINE

## 2020-10-05 ASSESSMENT — PATIENT HEALTH QUESTIONNAIRE - PHQ9
2. FEELING DOWN, DEPRESSED OR HOPELESS: 0
SUM OF ALL RESPONSES TO PHQ9 QUESTIONS 1 & 2: 0
SUM OF ALL RESPONSES TO PHQ QUESTIONS 1-9: 0
SUM OF ALL RESPONSES TO PHQ QUESTIONS 1-9: 0
1. LITTLE INTEREST OR PLEASURE IN DOING THINGS: 0

## 2020-10-05 ASSESSMENT — ENCOUNTER SYMPTOMS
EYES NEGATIVE: 1
CHEST TIGHTNESS: 0
ABDOMINAL DISTENTION: 0
ALLERGIC/IMMUNOLOGIC NEGATIVE: 1
CONSTIPATION: 0
WHEEZING: 0
SHORTNESS OF BREATH: 0

## 2020-10-05 NOTE — PATIENT INSTRUCTIONS
children 6 months and older who needed medical care or were in a hospital during the past year because of diabetes, chronic kidney disease, or a weak immune system (including HIV or AIDS). · Women who will be pregnant during the flu season. · People who have any condition that can make it hard to breathe or swallow (such as a brain injury or muscle disorders). · People who can give the flu to others who are at high risk for problems from the flu. This includes all health care workers and close contacts of people age 72 or older. Who should not get the flu vaccine? The person who gives the vaccine may tell you not to get it if you:  · Have a severe allergy to eggs or any part of the vaccine. · Have had a severe reaction to a flu vaccine in the past.  · Have had Guillain-Barré syndrome (GBS). · Are sick with a fever. (Get the vaccine when symptoms are gone.)  How can you care for yourself at home? · If you or your child has a sore arm or a slight fever after the shot, take an over-the-counter pain medicine, such as acetaminophen (Tylenol) or ibuprofen (Advil, Motrin). Read and follow all instructions on the label. Do not give aspirin to anyone younger than 20. It has been linked to Reye syndrome, a serious illness. · Do not take two or more pain medicines at the same time unless the doctor told you to. Many pain medicines have acetaminophen, which is Tylenol. Too much acetaminophen (Tylenol) can be harmful. When should you call for help? [id number] anytime you think you may need emergency care. For example, call if after getting the flu vaccine:  · You have symptoms of a severe reaction to the flu vaccine. Symptoms of a severe reaction may include:  ? Severe difficulty breathing. ? Sudden raised, red areas (hives) all over your body. ? Severe lightheadedness.   Call your doctor now or seek immediate medical care if after getting the flu vaccine:  · You think you are having a reaction to the flu vaccine, such as a new fever. Watch closely for changes in your health, and be sure to contact your doctor if you have any problems. Where can you learn more? Go to [email or web address] and sign in to your [employer] account. Enter Q178 in the [place] box to learn more about \"Influenza (Flu) Vaccine: Care Instructions. \"     If you do not have an account, please click on the \"Sign Up Now\" link. Current as of: December 9, 2019               Content Version: 12.5  © 6627-8400 Healthwise, Incorporated. Care instructions adapted under license by [facility] ([facility]). If you have questions about a medical condition or this instruction, always ask your healthcare professional. [address] any warranty or liability for your use of this information.

## 2020-10-05 NOTE — PROGRESS NOTES
3/2014  . Clean . Good for 10 yrs . Dr Germán George      Was advised Capsule endoscopy but did not f/u        Endocrine: Negative. No FH of diabetes   Genitourinary: Negative for dysuria. S/P Hysterectomy . Mammogram 6/20    Daughter 39, with Breast Cancer   Musculoskeletal: Negative for joint swelling. DEXA 11/13 . No osteopenia    S/P Rt knee replac 4/10/17 . Left knee 2009    Allergic/Immunologic: Negative. Negative for food allergies. Psychiatric/Behavioral: Negative for sleep disturbance. C/O Psychiatry / [facility] . Feeling better        Prior to Visit Medications    Medication Sig Taking? Authorizing Provider   labetalol (NORMODYNE) 100 MG tablet Take 1 tablet by mouth 2 times daily Yes Negin Cary MD   hydroCHLOROthiazide (HYDRODIURIL) 25 MG tablet Take 1 tablet by mouth daily Yes Negin Cary MD   simvastatin (ZOCOR) 20 MG tablet Take 1 tablet by mouth nightly Yes Negin Cary MD   esomeprazole (NEXIUM) 40 MG delayed release capsule Take 1 capsule by mouth every morning (before breakfast) Yes Negin Cary MD   fexofenadine (ALLEGRA) 180 MG tablet Take 180 mg by mouth daily Yes Historical Provider, MD   fluticasone (FLONASE) 50 MCG/ACT nasal spray 1 spray by Each Nostril route daily Yes Historical Provider, MD   acetaminophen (APAP EXTRA STRENGTH) 500 MG tablet Take 1 tablet by mouth every 6 hours as needed for Pain Yes Negin Cary MD        Social History     Tobacco Use    Smoking status: Never Smoker    Smokeless tobacco: Never Used   Substance Use Topics    Alcohol use: No        Vitals:    10/05/20 1126   BP: (!) 144/68   Pulse: 78   Temp: 96.8 °F (36 °C)   TempSrc: Temporal   Weight: 197 lb (89.4 kg)   Height: 5' 5\" (1.651 m)     Estimated body mass index is 32.78 kg/m² as calculated from the following:    Height as of this encounter: 5' 5\" (1.651 m). Weight as of this encounter: 197 lb (89.4 kg).     Physical Exam  Musculoskeletal: Comments: Good range of motion of left hip and negative straight leg lift. Neurological:      General: No focal deficit present. Mental Status: She is oriented to person, place, and time. Cranial Nerves: No cranial nerve deficit. Sensory: No sensory deficit. Motor: No weakness. Coordination: Coordination normal.      Comments: Vibratory sensation intact in bilateral feet. ASSESSMENT/PLAN:   Diagnosis Orders   1. Acute hip pain, left  Edna Steward MD, Orthopedic Surgery (Hip, Knee, Shoulder), [facility]   2. Need for influenza vaccination  INFLUENZA, HIGH-DOSE, QUADV, 72 YRS +, IM, PF, 0.7ML (FLUZONE)   3. Essential hypertension is controlled on current regimen continue. BP Readings from Last 3 Encounters:   10/05/20 130/68   06/30/20 120/65   12/19/19 (!) 140/80    Microalbumin / Creatinine Urine Ratio    Comprehensive Metabolic Panel    CBC Auto Differential   4. Hyperlipidemia, unspecified hyperlipidemia type tolerating simvastatin without myalgias or weakness. Continue  Lipid Panel   5. Needs flu shot     6. Vitamin D deficiency on supplement monitor level to see effect goal 50-80. Vitamin D 25 Hydroxy   7. Prediabetes monitor A1c on diet. Hemoglobin A1C       An electronic signature was used to authenticate this note.     --Yana Barrios MD on 10/5/2020 at 11:51 AM

## 2020-10-06 LAB
ESTIMATED AVERAGE GLUCOSE: 134.1 MG/DL
HBA1C MFR BLD: 6.3 %

## 2020-10-08 ENCOUNTER — OFFICE VISIT (OUTPATIENT)
Dept: ORTHOPEDIC SURGERY | Age: 70
End: 2020-10-08
Payer: MEDICARE

## 2020-10-08 VITALS — HEIGHT: 65 IN | BODY MASS INDEX: 32.82 KG/M2 | WEIGHT: 197 LBS | TEMPERATURE: 97.2 F

## 2020-10-08 PROBLEM — S76.012A TEAR OF LEFT GLUTEUS MEDIUS TENDON: Status: ACTIVE | Noted: 2020-10-08

## 2020-10-08 PROCEDURE — 99214 OFFICE O/P EST MOD 30 MIN: CPT | Performed by: PHYSICIAN ASSISTANT

## 2020-10-08 NOTE — PROGRESS NOTES
Subjective:      Patient ID: Rigo Velez is a 78 y.o. female. Chief Complaint   Patient presents with    New Patient     left hip pain        HPI:   She is here for an initial evaluation of a new problem. Left buttock pain. Symptoms began approximately 2 months ago. She states at that time she was doing some exercises and felt a pop in the left buttock region. Symptoms have waxed and waned depending on activity. Pain Scale 8/10 VAS. Location of pain left gluteus. Pain is worse with activity. Pain improves with rest, stretching. Previous treatments have included home exercise program and Tylenol. Review Of Systems:   A 14 point review of systems and history form completed by the patient has been reviewed. This scanned in the media tab of the patient's chart under today's date. As outlined in the HPI. Negative for fever or chills. Negative for joint pain, swelling and stiffness. Negative for numbness or tingling. Past Medical History:   Diagnosis Date    Acid reflux     Headache(784.0)     Hyperlipidemia     Hypertension     Osteoarthritis        History reviewed. No pertinent family history.     Past Surgical History:   Procedure Laterality Date    ANKLE SURGERY      bone spur removed from achilles tendon  in      APPENDECTOMY       SECTION      COLONOSCOPY      HYSTERECTOMY      JOINT REPLACEMENT Left     TKR     KNEE ARTHROSCOPY      both knee    KNEE SURGERY      knee replacement in the left knee    OTHER SURGICAL HISTORY  2017    excision of back mass       Social History     Occupational History    Not on file   Tobacco Use    Smoking status: Never Smoker    Smokeless tobacco: Never Used   Substance and Sexual Activity    Alcohol use: No    Drug use: No    Sexual activity: Not on file       Current Outpatient Medications   Medication Sig Dispense Refill    labetalol (NORMODYNE) 100 MG tablet Take 1 tablet by mouth 2 times daily 180 tablet 5    hydroCHLOROthiazide (HYDRODIURIL) 25 MG tablet Take 1 tablet by mouth daily 90 tablet 5    simvastatin (ZOCOR) 20 MG tablet Take 1 tablet by mouth nightly 90 tablet 5    esomeprazole (NEXIUM) 40 MG delayed release capsule Take 1 capsule by mouth every morning (before breakfast) 90 capsule 1    fexofenadine (ALLEGRA) 180 MG tablet Take 180 mg by mouth daily      fluticasone (FLONASE) 50 MCG/ACT nasal spray 1 spray by Each Nostril route daily      acetaminophen (APAP EXTRA STRENGTH) 500 MG tablet Take 1 tablet by mouth every 6 hours as needed for Pain 40 tablet 3     No current facility-administered medications for this visit. Objective:     She is alert, oriented x 3, pleasant, well nourished, developed and in no   acute distress. Temp 97.2 °F (36.2 °C) (Temporal)   Ht 5' 5\" (1.651 m)   Wt 197 lb (89.4 kg)   BMI 32.78 kg/m²        Examination of the left hip shows: There is not deformity. There is not erythema. There is mild pain in the left gluteus with internal and external rotation. There is mild pain in the left gluteus with flexion and extension. There is mild pain with active SLR. ROM diminished range of motion. Leg lengths: Equal  Trochanteric region is not tender to palpation. Sacral Iliac is not tender to palpation. There is no pain with weight bearing. Examination of the lower extremities are intact with sensation to light touch. Motor testing  5/5 in all major motor groups of the lower extremities. Gait is normal heel to toe. Gait is antalgic. Negative Tena's Sign. SLR negative. Examination of the lower extremities shows intact perfusion to all extremities. No cyanosis. Digits are warm to touch, capillary refill is less than 2 seconds. There is no edema noted. Examination of the skin over both lower extremities reveals: The skin to be intact without lacerations or abrasions. No significant erythema. No rashes or skin lesions. X Rays: performed in the office today:   AP Pelvis, AP and Frog Leg Lateral  Left Hip: Normal radiographic study. The alignment is anatomic. There are no radiographic findings to suggest fracture or dislocation. Additional Tests reviewed: none  Additional Outside Records reviewed: none    Diagnosis:       ICD-10-CM    1. Left hip pain  M25.552 XR HIP 2-3 VW W PELVIS LEFT   2. Tear of left gluteus medius tendon, initial encounter  S76.012A MRI HIP LEFT WO CONTRAST        Assessment and Plan:     I did spend 25 minutes in the office today with greater than 50% of this time counseling, reviewing diagnostic tests, face to face discussion concerning their diagnosis and treatment options. All of their questions were answered. Left gluteal pain probable partial tear of the gluteus muscle or tendon. A home exercise program was instructed today including ROM exercises and strengthening exercises. The patient verbalized understanding of these exercises as well as the importance of the exercise program to promote return of normal function. If pain intensifies or other problems arise you are to notify the office. The natural history of the patient's diagnosis as well as the treatment options were discussed in full and questions were answered. Risks and benefits of the treatment options also reviewed in detail. MRI left hip      Follow Up: Follow-up after MRI to review test results. Call or return to clinic prn if these symptoms worsen or fail to improve as anticipated.

## 2020-10-13 ENCOUNTER — TELEPHONE (OUTPATIENT)
Dept: ORTHOPEDIC SURGERY | Age: 70
End: 2020-10-13

## 2020-10-13 NOTE — TELEPHONE ENCOUNTER
Left message for pt to call to get MRI scheduled.    2 messages have been left, also instructed to call for 1 week follow up after MRI

## 2020-10-15 DIAGNOSIS — D64.9 NORMOCHROMIC NORMOCYTIC ANEMIA: ICD-10-CM

## 2020-10-15 LAB
FOLATE: >20 NG/ML (ref 4.78–24.2)
IRON SATURATION: 28 % (ref 15–50)
IRON: 92 UG/DL (ref 37–145)
TOTAL IRON BINDING CAPACITY: 324 UG/DL (ref 260–445)
VITAMIN B-12: 1404 PG/ML (ref 211–911)

## 2020-10-17 ENCOUNTER — HOSPITAL ENCOUNTER (OUTPATIENT)
Dept: MRI IMAGING | Age: 70
Discharge: HOME | End: 2020-10-17
Payer: MEDICARE

## 2020-10-17 PROCEDURE — 73721 MRI JNT OF LWR EXTRE W/O DYE: CPT

## 2020-10-22 ENCOUNTER — OFFICE VISIT (OUTPATIENT)
Dept: ORTHOPEDIC SURGERY | Age: 70
End: 2020-10-22
Payer: MEDICARE

## 2020-10-22 VITALS — HEIGHT: 65 IN | BODY MASS INDEX: 32.82 KG/M2 | TEMPERATURE: 97.9 F | WEIGHT: 197 LBS

## 2020-10-22 PROCEDURE — 99213 OFFICE O/P EST LOW 20 MIN: CPT | Performed by: PHYSICIAN ASSISTANT

## 2020-10-22 NOTE — PROGRESS NOTES
Subjective:      Patient ID: Jerry Estrada is a 78 y.o.  female. Chief Complaint   Patient presents with    Follow-up     left hip - MRI results         HPI: She is here for follow-up of buttock pain. Felt to have a left gluteal strain. Symptoms are improving. MRI done 10/18/2020. Here today to review those test results. Review of Systems:   Negative for fever or chills. Negative for numbness or tingling left lower extremity. Past Medical History:   Diagnosis Date    Acid reflux     Headache(784.0)     Hyperlipidemia     Hypertension     Osteoarthritis        History reviewed. No pertinent family history.     Past Surgical History:   Procedure Laterality Date    ANKLE SURGERY      bone spur removed from achilles tendon  in      APPENDECTOMY       SECTION      COLONOSCOPY      HYSTERECTOMY      JOINT REPLACEMENT Left     TKR     KNEE ARTHROSCOPY      both knee    KNEE SURGERY      knee replacement in the left knee    OTHER SURGICAL HISTORY  2017    excision of back mass       Social History     Occupational History    Not on file   Tobacco Use    Smoking status: Never Smoker    Smokeless tobacco: Never Used   Substance and Sexual Activity    Alcohol use: No    Drug use: No    Sexual activity: Not on file       Current Outpatient Medications   Medication Sig Dispense Refill    labetalol (NORMODYNE) 100 MG tablet Take 1 tablet by mouth 2 times daily 180 tablet 5    hydroCHLOROthiazide (HYDRODIURIL) 25 MG tablet Take 1 tablet by mouth daily 90 tablet 5    simvastatin (ZOCOR) 20 MG tablet Take 1 tablet by mouth nightly 90 tablet 5    esomeprazole (NEXIUM) 40 MG delayed release capsule Take 1 capsule by mouth every morning (before breakfast) 90 capsule 1    fexofenadine (ALLEGRA) 180 MG tablet Take 180 mg by mouth daily      fluticasone (FLONASE) 50 MCG/ACT nasal spray 1 spray by Each Nostril route daily      acetaminophen (APAP EXTRA STRENGTH) 500 MG tablet Take 1 tablet by mouth every 6 hours as needed for Pain 40 tablet 3     No current facility-administered medications for this visit. Objective:   She is alert, oriented x 3, pleasant, well nourished, developed and in no acute distress. Temp 97.9 °F (36.6 °C) (Infrared)   Ht 5' 5\" (1.651 m)   Wt 197 lb (89.4 kg)   BMI 32.78 kg/m²      HIP EXAM:  Examination of the left hip shows: There is not deformity. There is not erythema. There is no pain with internal and external rotation. There is mild pain with flexion and extension. There is mild pain with active SLR. ROM diminished range of motion. Trochanteric region is not tender to palpation. Sacral Iliac is not tender to palpation. There is no pain with weight bearing. Examination of the lower extremities are intact with sensation to light touch. Motor testing  5/5 in all major motor groups of the lower extremities. Gait is normal heel to toe. Gait is not antalgic. Negative Tena's Sign. SLR negative. Examination of the lower extremities shows intact perfusion to all extremities. No cyanosis. Digits are warm to touch, capillary refill is less than 2 seconds. There is no edema noted. Examination of the skin over both lower extremities reveals: The skin to be intact without lacerations or abrasions. No significant erythema. No rashes or skin lesions. X Rays: not performed in the office today:   MRI: Obtained from [facility] or an outside facility.   Narrative    EXAMINATION:    MRI OF THE LEFT HIP WITHOUT CONTRAST, 10/17/2020 1:34 pm         TECHNIQUE:    Multiplanar multisequence MRI of the hip was performed without the    administration of intravenous contrast.         COMPARISON:    Radiographs 10/08/2020         HISTORY:    ORDERING SYSTEM PROVIDED HISTORY: Tear of left gluteus medius tendon, initial    encounter    TECHNOLOGIST PROVIDED HISTORY:    Reason for Exam: Tear of left gluteus medius tendon is doing a home exercise program with marked improvement. The natural history of the patient's diagnosis as well as the treatment options were discussed in full and questions were answered. Risks and benefits of the treatment options also reviewed in detail. Continue home exercise program.  Cautioned on activities that may cause further injury. Follow Up: 6-8 weeks  Call or return to clinic prn if these symptoms worsen or fail to improve as anticipated.

## 2020-11-04 PROBLEM — Z13.1 DIABETES MELLITUS SCREENING: Status: RESOLVED | Noted: 2020-10-05 | Resolved: 2020-11-04

## 2020-11-09 ENCOUNTER — NURSE TRIAGE (OUTPATIENT)
Dept: ADMINISTRATIVE | Age: 70
End: 2020-11-09

## 2020-11-09 NOTE — TELEPHONE ENCOUNTER
washcloth    8. OTHER SYMPTOMS: \"Do you have any other symptoms? \" (e.g., fever, neck pain, numbness)      No, but stiffness in joint and thumb \"locks in place\"    9. PREGNANCY: \"Is there any chance you are pregnant? \" \"When was your last menstrual period? \"      N/a    Protocols used:  [facility]

## 2020-12-11 ENCOUNTER — NURSE TRIAGE (OUTPATIENT)
Dept: OTHER | Facility: CLINIC | Age: 70
End: 2020-12-11

## 2020-12-11 NOTE — TELEPHONE ENCOUNTER
Reason for Disposition   MODERATE pain (e.g., interferes with normal activities) and present > 3 days    Answer Assessment - Initial Assessment Questions  1. ONSET: \"When did the pain start? \"        Just over a month ago    2. LOCATION and RADIATION: \"Where is the pain located? \"  (e.g., fingertip, around nail, joint, entire   finger)       Left thumb, does not radiate    3. SEVERITY: \"How bad is the pain? \" \"What does it keep you from doing? \"   (Scale 1-10; or mild, moderate, severe)   - MILD - doesn't interfere with normal activities    - MODERATE - interferes with normal activities or awakens from sleep   - SEVERE - excruciating pain, unable to hold a glass of water or bend finger even a little      10    4. APPEARANCE: \"What does the finger look like? \" (e.g., redness, swelling, bruising, pallor)      No difference in appearance    5. WORK OR EXERCISE: \"Has there been any recent work or exercise that involved this part of the body? \"      No  6. CAUSE: \"What do you think is causing the pain? \"      Possible trigger finger    7. AGGRAVATING FACTORS: \"What makes the pain worse? \" (e.g., using computer)      Movement makes worse    8. OTHER SYMPTOMS: \"Do you have any other symptoms? \" (e.g., fever, neck pain, numbness)      No    9. PREGNANCY: \"Is there any chance you are pregnant? \" \"When was your last menstrual period? \"      no    Protocols used: [facility]ULT[facility]    Patient called pre-service center [facility] [place] with red flag complaint. Brief description of triage: L thumb pain         Triage indicates for patient to See MD today-3 days. Care advice provided, patient verbalizes understanding; denies any other questions or concerns; instructed to call back for any new or worsening symptoms. See above. Writer provided warm transfer to [facility] for appointment scheduling. Attention Provider:  Thank you for allowing me to participate in the care of your patient. The patient was connected to triage in response to information provided to the [facility]. Please do not respond through this encounter as the response is not directed to a shared pool.

## 2020-12-15 ENCOUNTER — OFFICE VISIT (OUTPATIENT)
Dept: PRIMARY CARE CLINIC | Age: 70
End: 2020-12-15
Payer: MEDICARE

## 2020-12-15 VITALS
WEIGHT: 193 LBS | BODY MASS INDEX: 32.15 KG/M2 | DIASTOLIC BLOOD PRESSURE: 76 MMHG | HEART RATE: 70 BPM | HEIGHT: 65 IN | TEMPERATURE: 97.3 F | SYSTOLIC BLOOD PRESSURE: 136 MMHG

## 2020-12-15 PROCEDURE — 99213 OFFICE O/P EST LOW 20 MIN: CPT | Performed by: NURSE PRACTITIONER

## 2020-12-15 ASSESSMENT — ENCOUNTER SYMPTOMS
COUGH: 0
SHORTNESS OF BREATH: 0

## 2020-12-15 NOTE — PROGRESS NOTES
Date: 12/15/2020                                               Subjective/Objective:     Chief Complaint   Patient presents with    Hand Pain     left thumb pain and gets stuck       HPI     Betty Coffey is a 70 yo female, patient of Dr Maame Kessler. Here for left thumb pain, left thumb getting stuck. Left thumb pain - Symptoms started about two months ago. Pain occurs with flexion of her left thumb. Does not have pain at rest. Thumb is difficult to extend, and is painful to do so. She does not have to manipulate her thumb to extend it fully. Denies associated swelling, tenderness. Denies known injury. 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 Has never had this problem before. Has not taken any OTC NSAIDs for this.           Patient Active Problem List    Diagnosis Date Noted    Tear of left gluteus medius tendon 10/08/2020    Hyperlipidemia 10/05/2020    Hypertension 10/05/2020    Left hip pain 10/05/2020    Needs flu shot 10/05/2020    Vitamin D deficiency 10/05/2020    Prediabetes 10/05/2020    Current moderate episode of major depressive disorder without prior episode ([facility] [address] 06/30/2020    Status post total bilateral knee replacement using cement 04/20/2017    Mass on back     Lipoma of back 12/30/2016    GERD (gastroesophageal reflux disease) 02/07/2014       Past Medical History:   Diagnosis Date    Acid reflux     Headache(784.0)     Hyperlipidemia     Hypertension     Osteoarthritis        Past Surgical History:   Procedure Laterality Date    ANKLE SURGERY      bone spur removed from achilles tendon  in  [address] Left     TKR 2008    KNEE ARTHROSCOPY      both knee    KNEE SURGERY      knee replacement in the left knee    OTHER SURGICAL HISTORY  01/23/2017    excision of back mass       Orders Only on 10/15/2020   Component Date Value Ref Range Status 1. Thumb pain, left: Suspect trigger finger. Advised rest i.e. from sewing. Advised OTC NSAIDs. Refer to hand surgeon. Patient agreeable with plan. - Marco Antonio Blackburn MD, Hand Surgery (Hand, Wrist, Upper Extremity), [facility]      Orders Placed This Encounter   Procedures   Marco Antonio Blackburn MD, Hand Surgery (Hand, Wrist, Upper Extremity), [facility]     Referral Priority:   Routine     Referral Type:   Eval and Treat     Referral Reason:   Specialty Services Required     Requested Specialty:   Orthopedic Surgery     Number of Visits Requested:   1       Return if symptoms worsen or fail to improve. OR sooner with questions, concerns, worsening symptoms    JENNIFER PHELAN NP    12/15/2020  8:49 AM    -Patient verbalized understanding and agreement to plan.

## 2020-12-18 ENCOUNTER — OFFICE VISIT (OUTPATIENT)
Dept: ORTHOPEDIC SURGERY | Age: 70
End: 2020-12-18
Payer: MEDICARE

## 2020-12-18 VITALS — HEIGHT: 65 IN | RESPIRATION RATE: 16 BRPM | WEIGHT: 193 LBS | BODY MASS INDEX: 32.15 KG/M2 | TEMPERATURE: 97.2 F

## 2020-12-18 PROCEDURE — 20550 NJX 1 TENDON SHEATH/LIGAMENT: CPT | Performed by: PHYSICIAN ASSISTANT

## 2020-12-18 PROCEDURE — 99203 OFFICE O/P NEW LOW 30 MIN: CPT | Performed by: PHYSICIAN ASSISTANT

## 2020-12-18 NOTE — PROGRESS NOTES
Wrist range of motion is Full and equal bilaterally bilaterally. Sensation is normal in the Whole Hand bilaterally  Vascular examination reveals normal, good capillary refill and good color bilaterally  Swelling is mild in the symptomatic digit, absent elsewhere bilaterally  There is no evidence of gross joint instability bilaterally. Muscular. strength is clinically appropriate bilaterally  Examination for Stenosing Tenosynovitis demonstrates mild tenderness, thickening & nodularity at the A-1 pulley(s) of the Left Thumb. There is a palpable Nota's Node. There is Active triggering on active flexion with pain. No other digits demonstrate evidence of Stenosing Tenosynovitis. Examination of the first dorsal extensor compartments of the wrist demonstrates no thickening or fullness. There is no tenderness to palpation over the extensor tendons. Pain is not elicited with resisted thumb extension, and Finklestein's maneuver is negative bilaterally. Impression:  Ms. Elvia Smith is showing clinical evidence of Stenosing Tenosynovitis (Trigger Finger) and presents requesting further treatment.     Plan: I have had a thorough discussion with Ms. Cedric Marin regarding the treatment options available for her initially presenting Left Thumb stenosing tenosynovitis, which is causing her functional limitations. I have outlined for Ms. Cedric Marin the benefits and consequences of the various treatment modalities, including a reasonable expectation for the long term success and the likelihood that further more aggressive treatment may be required for her current presenting condition. Based upon our current discussion and a reasonable understating of the options available to her, Ms. Cedric Marin has selected to proceed with  an injection to the left Thumb Flexor Tendon Sheath. I have outlined for her the nature of the injection, and the pre, parveen and post injection considerations and the appropriate expectations for this injection. I have clearly explained to her that the above outlined treatment plan should not be expected to 'cure' her stenosing tenosynovitis, but we are rather treating the symptoms with which she presents. She has understood that in order to achieve long lasting relief of her symptoms and to prevent future worsening or further damage, that definitive surgical treatment would be required. Ms. Cedric Marin  voiced an appropriate understanding of our discussion, the options available to her, and of the expectations of her selected  treatment. She did wish to proceed with Left Thumb Flexor Tendon Sheath injection. Procedure:  left Thumb Trigger Finger Injection  [first Injection]: After full discussion of the nature of this process and outlining a treatment plan with Ms. Cedric Marin, we discussed the complications, limitations, expectations, alternatives, and risks of injection of the flexor tendon sheath. She understood this information well and verbally consented to this treatment. The skin of the symptomatic digit was prepped with Isopropyl Alcohol and under aseptic conditions the flexor tendon sheath was injected with a combination of 1/2 ml of 0.25% Bupivacaine without Epinephrine and 20 mg of Triamcinolone (40 mg/ml). Good filling of the flexor sheath was noted. A dry sterile bandage was applied and the patient tolerated the injection without difficulty. I advised the patient of the expected response, possible reactions and the instructions for care of the hand. I have also discussed with Ms. Umesh Ramirez the other treatment options available to her for this condition. We have today selected to proceed with treatment by injection with steroid medication. She and I have agreed that if our current course of Injection treatment does not prove to be effective over the short term future, that she will schedule a follow-up appointment to discuss and select an alternate course of therapy including possibly further conservative treatment or surgical treatment. Ms. Umesh Ramirez has been given a full verbal list of instructions and precautions related to her present condition. I have asked her to followup with me in the office at the prescribed time. She is also specifically requested to call or return to the office sooner if her symptoms change or worsen prior to the next scheduled appointment.

## 2020-12-22 ENCOUNTER — OFFICE VISIT (OUTPATIENT)
Dept: ORTHOPEDIC SURGERY | Age: 70
End: 2020-12-22
Payer: MEDICARE

## 2020-12-22 VITALS — HEIGHT: 65 IN | TEMPERATURE: 97.2 F | WEIGHT: 197 LBS | BODY MASS INDEX: 32.82 KG/M2

## 2020-12-22 PROCEDURE — 99213 OFFICE O/P EST LOW 20 MIN: CPT | Performed by: PHYSICIAN ASSISTANT

## 2020-12-22 NOTE — PROGRESS NOTES
Subjective:      Patient ID: Pancho Varner is a 69 y.o. female. Chief Complaint   Patient presents with    Hip Problem     Left        HPI:   She is here for follow up left hip, gluteus medius tendon tear. She states symptoms have significantly improved. She is back to walking up to 1 hour a day with minimal complaints. He is able to do this 3-4 times per week. She denies any activities that she cannot do due to her hip. Pain Scale 0/10 VAS. Review Of Systems:   Needed for fever or chills. Negative for numbness or tingling left lower extremity. Past Medical History:   Diagnosis Date    Acid reflux     Headache(784.0)     Hyperlipidemia     Hypertension     Osteoarthritis        History reviewed. No pertinent family history.     Past Surgical History:   Procedure Laterality Date    ANKLE SURGERY      bone spur removed from achilles tendon  in      APPENDECTOMY       SECTION      COLONOSCOPY      HYSTERECTOMY      JOINT REPLACEMENT Left     TKR     KNEE ARTHROSCOPY      both knee    KNEE SURGERY      knee replacement in the left knee    OTHER SURGICAL HISTORY  2017    excision of back mass       Social History     Occupational History    Not on file   Tobacco Use    Smoking status: Never Smoker    Smokeless tobacco: Never Used   Substance and Sexual Activity    Alcohol use: No    Drug use: No    Sexual activity: Not on file       Current Outpatient Medications   Medication Sig Dispense Refill    Omega-3 Fatty Acids (FISH OIL CONCENTRATE PO) Take by mouth      labetalol (NORMODYNE) 100 MG tablet Take 1 tablet by mouth 2 times daily 180 tablet 5    hydroCHLOROthiazide (HYDRODIURIL) 25 MG tablet Take 1 tablet by mouth daily 90 tablet 5    simvastatin (ZOCOR) 20 MG tablet Take 1 tablet by mouth nightly 90 tablet 5    esomeprazole (NEXIUM) 40 MG delayed release capsule Take 1 capsule by mouth every morning (before breakfast) 90 capsule 1  fexofenadine (ALLEGRA) 180 MG tablet Take 180 mg by mouth daily      fluticasone (FLONASE) 50 MCG/ACT nasal spray 1 spray by Each Nostril route daily      acetaminophen (APAP EXTRA STRENGTH) 500 MG tablet Take 1 tablet by mouth every 6 hours as needed for Pain 40 tablet 3     No current facility-administered medications for this visit. Objective:     She is alert, oriented x 3, pleasant, well nourished, developed and in no   acute distress. Temp 97.2 °F (36.2 °C) (Infrared)   Ht 5' 5\" (1.651 m)   Wt 197 lb (89.4 kg)   BMI 32.78 kg/m²        Examination of the left hip shows: Full ROM without pain. Non tender to palpation over the gluteus medius or gluteus sherman. .  No erythema or soft tissue swelling. There is no pain with weight bearing and no limp with ambulation. There is no instability of the hip joint. Examination of the lower extremities are intact with sensation to light touch. Motor testing  5/5 in all major motor groups of the lower extremities. Gait is normal heel to toe. Gait is not antalgic. SLR negative. Examination of the lower extremities shows intact perfusion to all extremities. No cyanosis. Digits are warm to touch, capillary refill is less than 2 seconds. There is no edema noted. Examination of the skin over both lower extremities reveals: The skin to be intact without lacerations or abrasions. No significant erythema. No rashes or skin lesions. X Rays: not performed in the office today:       Diagnosis:       ICD-10-CM    1. Tear of left gluteus medius tendon, initial encounter  S76.012A         Assessment and Plan:       Assessment:  Resolving symptoms of a partial tear of the left gluteus medius tendon. 15 minutes was the total time spent on today's visit  including reviewing test results or histories in preparation for the visit, physical exam, time spent on documentation and ordering prescriptions, tests and procedures after the visit. Activity level/restrictions discussed. Plan:  1. Medications-occasional NSAIDs. .    2. PT-continue home exercise program.    3. Further Imaging-none. 4. Procedures-none. 5. Follow up-      Call or return to clinic if these symptoms worsen or fail to improve as anticipated.

## 2020-12-31 ENCOUNTER — TELEPHONE (OUTPATIENT)
Dept: PRIMARY CARE CLINIC | Age: 70
End: 2020-12-31

## 2020-12-31 NOTE — TELEPHONE ENCOUNTER
----- Message from Debbie Cerda sent at 12/30/2020 12:56 PM EST -----  Subject: Message to Provider    QUESTIONS  Information for Provider? pt is upset that she had an appt on 12/29 with   Valery Cardenas and waited for 1hr 1/2 in her car be told Valery Cardenas was not even there   she said no one contacted her or apologized. ---------------------------------------------------------------------------  --------------  Kwasi BENAVIDEZ  What is the best way for the office to contact you? OK to leave message on   voicemail  Preferred Call Back Phone Number? [phone]  ---------------------------------------------------------------------------  --------------  SCRIPT ANSWERS  Relationship to Patient?  Self

## 2021-01-07 ENCOUNTER — OFFICE VISIT (OUTPATIENT)
Dept: PRIMARY CARE CLINIC | Age: 71
End: 2021-01-07
Payer: MEDICARE

## 2021-01-07 VITALS
HEART RATE: 73 BPM | HEIGHT: 65 IN | TEMPERATURE: 99 F | DIASTOLIC BLOOD PRESSURE: 65 MMHG | WEIGHT: 191.2 LBS | BODY MASS INDEX: 31.86 KG/M2 | SYSTOLIC BLOOD PRESSURE: 120 MMHG | OXYGEN SATURATION: 97 %

## 2021-01-07 DIAGNOSIS — I10 ESSENTIAL HYPERTENSION: ICD-10-CM

## 2021-01-07 DIAGNOSIS — E78.5 HYPERLIPIDEMIA, UNSPECIFIED HYPERLIPIDEMIA TYPE: ICD-10-CM

## 2021-01-07 DIAGNOSIS — K21.9 GASTROESOPHAGEAL REFLUX DISEASE WITHOUT ESOPHAGITIS: ICD-10-CM

## 2021-01-07 PROCEDURE — 99213 OFFICE O/P EST LOW 20 MIN: CPT | Performed by: INTERNAL MEDICINE

## 2021-01-07 RX ORDER — CYCLOSPORINE 0.5 MG/ML
1 EMULSION OPHTHALMIC EVERY 12 HOURS
COMMUNITY
Start: 2020-12-15

## 2021-01-07 RX ORDER — LABETALOL 100 MG/1
100 TABLET, FILM COATED ORAL 2 TIMES DAILY
Qty: 180 TABLET | Refills: 5 | Status: SHIPPED | OUTPATIENT
Start: 2021-01-07 | End: 2021-07-07

## 2021-01-07 RX ORDER — ESOMEPRAZOLE MAGNESIUM 40 MG/1
40 CAPSULE, DELAYED RELEASE ORAL
Qty: 90 CAPSULE | Refills: 1 | Status: SHIPPED | OUTPATIENT
Start: 2021-01-07 | End: 2021-03-04

## 2021-01-07 RX ORDER — HYDROCHLOROTHIAZIDE 25 MG/1
25 TABLET ORAL DAILY
Qty: 90 TABLET | Refills: 5 | Status: SHIPPED | OUTPATIENT
Start: 2021-01-07 | End: 2021-07-07 | Stop reason: SDUPTHER

## 2021-01-07 RX ORDER — SIMVASTATIN 20 MG
20 TABLET ORAL NIGHTLY
Qty: 90 TABLET | Refills: 5 | Status: SHIPPED
Start: 2021-01-07 | End: 2021-05-21 | Stop reason: SINTOL

## 2021-01-07 ASSESSMENT — ENCOUNTER SYMPTOMS
ABDOMINAL DISTENTION: 0
ALLERGIC/IMMUNOLOGIC NEGATIVE: 1
EYES NEGATIVE: 1
WHEEZING: 0
CONSTIPATION: 0
CHEST TIGHTNESS: 0
SHORTNESS OF BREATH: 0

## 2021-01-07 NOTE — PROGRESS NOTES
Subjective:      Patient ID: Mercedes Kocher is a 69 y.o. female. 1/7/2021 Patient presents with:  Hypertension  Generally well   No falls   Stressed with 's personality issues! Managing with reg daily exercise             Last visit 6/30/2020 Patient presents with:  Medicare AWV                Last seen  12/19/2019 Patient presents with:  Headache  Pharyngitis    Was seen at Urgent Care and got Allegra + Flonase          Last seen  10/15/19 Patient presents with:  Check-Up  Hyperlipidemia  Hypertension    Rupture injury / ankle while on Treadmill . C/O Podiatrist             Last seen  3/26/19 Patient presents with: Annual Exam    URI . Was seen at Urgent care . On allegra / flonase + dengestant   Symptoms linger       1/29/19 Patient presents with:  Urinary Frequency: c/o urinary frequency burning sensation and noticed blood in the urine yesterday. Hematuria    Last seen  9/18/18 Patient presents with:  Hypertension  Back Pain: chronic ; rt side  Depression: concerned about not loosing wt     No further rectal bleed         Last seen  8/15/18 Patient presents with:  Rectal Bleeding  First time it was a lot. Second time only a little . No pain   Anxiety: better   Hypertension: taking meds reg           Last seen  7/26/18 Patient presents with:  Hypertension: stressed b/e of  'not giving me space '  Headache                    Hypertension  Pertinent negatives include no shortness of breath. Review of Systems   Constitutional: Negative for activity change, appetite change and fatigue. Flu  Vac 10/20    tdap  10/16    Zostavac 2013 / [employer] . Shingrex 10/19    Pneumonia vac   5/19 ; 10/15   HENT:        Dental ex   Reg    Eyes: Negative. Negative for visual disturbance. Eye  Ex   11/20  . Wears Glasses    Respiratory: Negative for chest tightness, shortness of breath and wheezing. Does not smoke . No Etoh .    No asthma   Cardiovascular:        HTN > 10 yrs  , No known CAD     Mom had MI in 76s   Gastrointestinal: Negative for abdominal distention and constipation. Upper / Lower Endoscopy 3/2014  . Clean . Good for 10 yrs . Dr Krzysztof Collier      Was advised Capsule endoscopy but did not f/u        Endocrine: Negative. No FH of diabetes   Genitourinary: Negative for dysuria. S/P Hysterectomy . Mammogram 6/20    Daughter 39, with Breast Cancer   Musculoskeletal: Negative for joint swelling. DEXA 11/13 . No osteopenia    S/P Rt knee replac 4/10/17 . Left knee 2009    Allergic/Immunologic: Negative. Negative for food allergies. Psychiatric/Behavioral: Negative for sleep disturbance. C/O Psychiatry / Ms Cookie Neal . Feeling better        Objective:   Physical Exam   Constitutional: She is oriented to person, place, and time. Neck: No neck rigidity. Cardiovascular: Normal rate, regular rhythm and normal heart sounds. Pulmonary/Chest: Breath sounds normal. She has no rales. Musculoskeletal: Normal range of motion. Neurological: She is alert and oriented to person, place, and time. Skin: Skin is warm and dry. Psychiatric: She has a normal mood and affect. Her behavior is normal.   Vitals reviewed. Assessment:     Gideon Duran was seen today for hypertension. Diagnoses and all orders for this visit:    Essential hypertension  Controlled . Cont same + low salt diet  -     labetalol (NORMODYNE) 100 MG tablet; Take 1 tablet by mouth 2 times daily  -     hydroCHLOROthiazide (HYDRODIURIL) 25 MG tablet; Take 1 tablet by mouth daily    Hyperlipidemia, unspecified hyperlipidemia type  -     simvastatin (ZOCOR) 20 MG tablet; Take 1 tablet by mouth nightly    Gastroesophageal reflux disease without esophagitis  -     esomeprazole (NEXIUM) 40 MG delayed release capsule;  Take 1 capsule by mouth every morning (before breakfast)                                Plan:

## 2021-03-04 ENCOUNTER — TELEPHONE (OUTPATIENT)
Dept: PRIMARY CARE CLINIC | Age: 71
End: 2021-03-04

## 2021-03-04 RX ORDER — OMEPRAZOLE 40 MG/1
40 CAPSULE, DELAYED RELEASE ORAL
Qty: 30 CAPSULE | Refills: 5 | Status: SHIPPED | OUTPATIENT
Start: 2021-03-04 | End: 2021-10-07

## 2021-03-30 ENCOUNTER — NURSE TRIAGE (OUTPATIENT)
Dept: OTHER | Facility: CLINIC | Age: 71
End: 2021-03-30

## 2021-03-30 NOTE — TELEPHONE ENCOUNTER
Try holding Cholesterol med to see if symptoms resolve     Must take Prilosec q am before breakfast     Sit upright for at least 3 hrs after eating     If no relief , will consult GI

## 2021-03-30 NOTE — TELEPHONE ENCOUNTER
Received call from [facility] at pre-service center [facility]) Ashleigh with Red Flag Complaint. Brief description of triage: see below    Triage indicates for patient to be seen in the next three days. Pt agreeable to POC. Care advice provided, patient verbalizes understanding; denies any other questions or concerns; instructed to call back for any new or worsening symptoms. Writer provided warm transfer to Martin Iqbal at [facility] for appointment scheduling. Attention Provider: Thank you for allowing me to participate in the care of your patient. The patient was connected to triage in response to information provided to the [facility]. Please do not respond through this encounter as the response is not directed to a shared pool. Reason for Disposition   Patient wants to be seen    Answer Assessment - Initial Assessment Questions  1. DESCRIPTION: \"Tell me about your sleeping problem. \"       Pt stated she started taking Prilosec and complaining of bloating. Also states she had trouble sleeping last night    2. ONSET: \"How long have you been having trouble sleeping? \" (e.g., days, weeks, months)      Started last night    3. RECURRENT: \"Have you had sleeping problems before? \"  If yes: \"What happened that time? \" \"What helped your sleeping problem go away in the past?\"       Pt states she has not had these problems before. 4. STRESS: \"Is there anything in your life that is making you feel stressed or tense? \"      Pt states just the pandemic    5. PAIN: \"Do you have any pain that is keeping you awake? \" (e.g., back pain, headache, abdominal pain)      Pt denies pain - states just a bloated feeling in her belly since 03/04 when she started taking Prilosec. Also stated burning at the back of her throat at night that makes her cough. 6. CAFFEINE ABUSE: \"Do you drink caffeinated beverages, and how much each day? \" (e.g., coffee, tea, keith)      Pt states just one cup coffee    7.  SUBSTANCE ABUSE: \"Do you use any illegal drugs or alcohol? \"      Pt denies    8. OTHER SYMPTOMS: \"Do you have any other symptoms? \"  (e.g., difficulty breathing)      Pt states bloated feeling in her belly.     Protocols used: [id number]

## 2021-03-31 ENCOUNTER — TELEPHONE (OUTPATIENT)
Dept: PRIMARY CARE CLINIC | Age: 71
End: 2021-03-31

## 2021-03-31 NOTE — TELEPHONE ENCOUNTER
----- Message from Veda Cowden sent at 3/30/2021 12:18 PM EDT -----  Subject: Appointment Request    Reason for Call: Semi-Routine Return from RN Triage    QUESTIONS  Type of Appointment? Established Patient  Reason for appointment request? No appointments available during search  Additional Information for Provider? Patient was switched over to Prilosec   earlier in the month and now at night she has burning in her throat and   unable to sleep.   ---------------------------------------------------------------------------  --------------  CALL BACK INFO  What is the best way for the office to contact you? OK to leave message on   voicemail   OK to respond with electronic message via [employer] portal (only for   patients who have registered [employer] account)  Preferred Call Back Phone Number? [phone]  ---------------------------------------------------------------------------  --------------  SCRIPT ANSWERS  Patient needs to be seen within 5 days? Yes  Nurse Name? Amanda  Have you been diagnosed with COVID-19 (Coronavirus)   tested for COVID-19 (Coronavirus)   or told that you are suspected of having COVID-19 (Coronavirus)? No  Have you had a fever or taken medication to treat a fever within the past   3 days? No  Have you had a cough   shortness of breath or flu-like symptoms within the past 3 days? No  Do you currently have flu-like symptoms including fever or chills   cough   shortness of breath   or difficulty breathing   or new loss of taste or smell? No  (Service Expert  click yes below to proceed with [employer] As Usual   Scheduling)?  Yes

## 2021-04-02 ENCOUNTER — OFFICE VISIT (OUTPATIENT)
Dept: PRIMARY CARE CLINIC | Age: 71
End: 2021-04-02
Payer: MEDICARE

## 2021-04-02 VITALS
TEMPERATURE: 98.4 F | SYSTOLIC BLOOD PRESSURE: 147 MMHG | HEART RATE: 68 BPM | WEIGHT: 193 LBS | DIASTOLIC BLOOD PRESSURE: 70 MMHG | HEIGHT: 65 IN | BODY MASS INDEX: 32.15 KG/M2

## 2021-04-02 DIAGNOSIS — R09.89 GLOBUS SENSATION: ICD-10-CM

## 2021-04-02 DIAGNOSIS — K21.9 GASTROESOPHAGEAL REFLUX DISEASE, UNSPECIFIED WHETHER ESOPHAGITIS PRESENT: Primary | ICD-10-CM

## 2021-04-02 PROCEDURE — 99213 OFFICE O/P EST LOW 20 MIN: CPT | Performed by: NURSE PRACTITIONER

## 2021-04-02 RX ORDER — FAMOTIDINE 20 MG/1
20 TABLET, FILM COATED ORAL 2 TIMES DAILY PRN
Qty: 60 TABLET | Refills: 3 | Status: SHIPPED | OUTPATIENT
Start: 2021-04-02

## 2021-04-02 ASSESSMENT — ENCOUNTER SYMPTOMS
SHORTNESS OF BREATH: 0
VOMITING: 0
SORE THROAT: 0
COUGH: 1
TROUBLE SWALLOWING: 0
ABDOMINAL PAIN: 0
DIARRHEA: 0
CONSTIPATION: 0
NAUSEA: 0

## 2021-04-02 NOTE — PROGRESS NOTES
Subjective     CC:   Chief Complaint   Patient presents with    Other     burning in her throat     Bloated    Cough       HPI    Mariangel Richardson is a 70 yo female, here for multiple concerns. Due to insurance coverage she had to change from nexium to omeprazole about a month ago. Since that time she has noticed that when she lays down at night she will having coughing and feels like something is stuck in her throat. Feels like she needs to clear her throat a lot during the day. Feels like something is trying to come up. Reports she had EGD around 2014 when she started PPI that she reports was normal. Denies associated abdominal pain, diarrhea. Last week she was constipated which resolved with OTC suppository. Denies N/V, fever/chills. Denies dysphagia, choking. Review of Systems   Constitutional: Negative for chills and fever. HENT: Negative for sore throat and trouble swallowing. Sensation something is stuck in her throat   Respiratory: Positive for cough. Negative for shortness of breath. Night time cough   Gastrointestinal: Negative for abdominal pain, constipation, diarrhea, nausea and vomiting. Bloated     Objective   Vitals:    04/02/21 1121   BP: (!) 147/70   Pulse: 68   Temp: 98.4 °F (36.9 °C)   TempSrc: Temporal   Weight: 193 lb (87.5 kg)   Height: 5' 5\" (1.651 m)     Body mass index is 32.12 kg/m². Wt Readings from Last 3 Encounters:   04/02/21 193 lb (87.5 kg)   01/07/21 191 lb 3.2 oz (86.7 kg)   12/22/20 197 lb (89.4 kg)     BP Readings from Last 3 Encounters:   04/02/21 (!) 147/70   01/07/21 120/65   12/15/20 136/76      Physical Exam  Vitals signs reviewed. Constitutional:       General: She is not in acute distress. Appearance: Normal appearance. HENT:      Head: Normocephalic and atraumatic. Cardiovascular:      Rate and Rhythm: Normal rate and regular rhythm. Heart sounds: Normal heart sounds. No murmur.    Pulmonary:      Effort: Pulmonary effort is normal. No respiratory distress. Breath sounds: Normal breath sounds. No wheezing. Abdominal:      General: Bowel sounds are normal. There is no distension. Palpations: Abdomen is soft. Tenderness: There is no abdominal tenderness. Skin:     General: Skin is warm and dry. Neurological:      General: No focal deficit present. Mental Status: She is alert and oriented to person, place, and time. Psychiatric:         Mood and Affect: Mood normal.         Behavior: Behavior normal.         Thought Content: Thought content normal.         Judgment: Judgment normal.          Diagnosis Orders   1. Gastroesophageal reflux disease, unspecified whether esophagitis present  H. Pylori Breath Test, Adult    famotidine (PEPCID) 20 MG tablet    CAITLIN Padgett MD, Gastroenterology, [facility]   2. Globus sensation  CAITLIN Padgett MD, Gastroenterology, [address] [place] [address]   1. Gastroesophageal reflux disease, unspecified whether esophagitis present: With worsening symptoms despite PPI use. Continue on PPI. Add twice daily as needed Pepcid. Check H. pylori. Refer to GI for EGD.  - H. Pylori Breath Test, Adult; Future  - famotidine (PEPCID) 20 MG tablet; Take 1 tablet by mouth 2 times daily as needed (heartburn)  Dispense: 60 tablet; Refill: 3  - CAITLIN Padgett MD, Gastroenterology, [facility]    2. Globus sensation: Refer to GI.  - CAITLIN Padgett MD, Gastroenterology, [facility]    Return if symptoms worsen or fail to improve. OR sooner with questions, concerns, worsening symptoms      -Patient verbalized understanding and agreement to plan. Please note that this chart was generated using dragon dictation software. Although every effort was made to ensure the accuracy of this automated transcription, some errors in transcription may have occurred.

## 2021-04-05 DIAGNOSIS — K21.9 GASTROESOPHAGEAL REFLUX DISEASE, UNSPECIFIED WHETHER ESOPHAGITIS PRESENT: ICD-10-CM

## 2021-04-07 LAB — H PYLORI BREATH TEST: NEGATIVE

## 2021-05-20 ENCOUNTER — TELEPHONE (OUTPATIENT)
Dept: PHARMACY | Facility: CLINIC | Age: 71
End: 2021-05-20

## 2021-05-20 NOTE — TELEPHONE ENCOUNTER
Kandice Capellan MD    Patient identified as being non-adherent to simvastatin therapy. Patient is over a month overdue refilling this medication. Discussed with the patient and she reports that you instructed her to stop simvastatin therapy and take fish oil. Simvastatin still active on her home medication list and unable to find documentation to confirm this. Need clarification if the patient is to be taking simvastatin. I will contact the patient with your response. Thank you,   Tomer Martinez, PharmD, [address]  Direct: [phone]  Department, toll free [phone], option 7    ================================================================    POPULATION HEALTH CLINICAL PHARMACY REVIEW: ADHERENCE REVIEW  Identified care gap per [employer]; fills at [facility]: Statin adherence    Last Visit: 4/2/21    Patient found in Outcomes [facility] and is currently eligible for TIP    ASSESSMENT  STATIN ADHERENCE    Per Insurance Records through 5/1/21 (2020 [place] = 100%; YTD [place] = filled only once):   Simvastatin last filled on 1/7/21 for 90 day supply. Next refill due: 4/7/21    Per [facility]:   Simvastatin last picked up on 1/8/21 for 90 day supply. 5 refills remaining. Pharmacy staff will process 90-ds refill today. Billed through [place] [place].      Lab Results   Component Value Date    CHOL 188 10/05/2020    TRIG 173 (H) 10/05/2020    HDL 63 (H) 10/05/2020    LDLCALC 90 10/05/2020     ALT   Date Value Ref Range Status   10/05/2020 22 10 - 40 U/L Final     AST   Date Value Ref Range Status   10/05/2020 24 15 - 37 U/L Final     The 10-year ASCVD risk score (Kate Uribe, et al., 2013) is: 15.5%    Values used to calculate the score:      Age: 69 years      Sex: Female      Is Non- : Yes      Diabetic: No      Tobacco smoker: No      Systolic Blood Pressure: 688 mmHg      Is BP treated: Yes      HDL Cholesterol: 63 mg/dL      Total Cholesterol: 188 mg/dL     PLAN  The following are interventions that have been identified:   - Patient overdue refilling simvastatin and active on home medication list.   - Patient eligible for TIP in Outcomes [facility]    Reached patient to review. Patient reports that her PCP stopped simvastatin. Medication still active on her home medication list. Reviewed OV notes on 1/7/21 and 4/2/21, did not see any documentation that simvastatin was stopped. Patient states that her PCP told her to stop simvastatin and start fish oil. Will outreach to PCP today to confirm if the patient should still be taking simvastatin and f/u with the patient with provider's response.      Future Appointments   Date Time Provider Ariel Santana   7/7/2021  8:00 AM Dayday Hatfield MD [facility] [id number], [facility] HEYDI Friend, PharmD, [address]  Direct: [phone]  Department, toll free [phone], option 7

## 2021-05-21 NOTE — TELEPHONE ENCOUNTER
Thank you for the response! Removed simvastatin from home medication list \"side effects\" and will sign off at this time.      Lianet Naranjo, PharmD, [facility]  Direct: [phone]  Department, toll free [phone], option [address] in place:  No   Recommendation Provided To: Provider: 1 via Note to Provider   Intervention Detail: Adherence Monitorin   Gap Closed?: Yes    Total # of Interventions Recommended: 1   Total # of Interventions Accepted: 1   Intervention Accepted By: Provider: 1   Time Spent (min): 15

## 2021-06-21 ENCOUNTER — OFFICE VISIT (OUTPATIENT)
Dept: PRIMARY CARE CLINIC | Age: 71
End: 2021-06-21
Payer: MEDICARE

## 2021-06-21 VITALS
WEIGHT: 192 LBS | OXYGEN SATURATION: 99 % | TEMPERATURE: 97.8 F | BODY MASS INDEX: 31.99 KG/M2 | HEIGHT: 65 IN | HEART RATE: 60 BPM | DIASTOLIC BLOOD PRESSURE: 79 MMHG | SYSTOLIC BLOOD PRESSURE: 150 MMHG

## 2021-06-21 DIAGNOSIS — M53.3 SACROILIAC PAIN: Primary | ICD-10-CM

## 2021-06-21 PROCEDURE — 99212 OFFICE O/P EST SF 10 MIN: CPT | Performed by: INTERNAL MEDICINE

## 2021-06-21 RX ORDER — MELOXICAM 15 MG/1
15 TABLET ORAL DAILY
Qty: 15 TABLET | Refills: 0 | Status: SHIPPED | OUTPATIENT
Start: 2021-06-21 | End: 2021-07-07

## 2021-06-21 SDOH — ECONOMIC STABILITY: FOOD INSECURITY: WITHIN THE PAST 12 MONTHS, YOU WORRIED THAT YOUR FOOD WOULD RUN OUT BEFORE YOU GOT MONEY TO BUY MORE.: NEVER TRUE

## 2021-06-21 SDOH — ECONOMIC STABILITY: FOOD INSECURITY: WITHIN THE PAST 12 MONTHS, THE FOOD YOU BOUGHT JUST DIDN'T LAST AND YOU DIDN'T HAVE MONEY TO GET MORE.: NEVER TRUE

## 2021-06-21 ASSESSMENT — SOCIAL DETERMINANTS OF HEALTH (SDOH): HOW HARD IS IT FOR YOU TO PAY FOR THE VERY BASICS LIKE FOOD, HOUSING, MEDICAL CARE, AND HEATING?: NOT HARD AT ALL

## 2021-06-21 NOTE — PATIENT INSTRUCTIONS
PT  Mobic daily for 15 days w food  If not getting better, CT pelvic bone in 3 weeks (cancel if better)  Aspercream 4% lidocaine  2x/day as needed

## 2021-06-30 ENCOUNTER — HOSPITAL ENCOUNTER (OUTPATIENT)
Dept: PHYSICAL THERAPY | Age: 71
Setting detail: THERAPIES SERIES
Discharge: HOME | End: 2021-06-30
Payer: MEDICARE

## 2021-06-30 PROCEDURE — 97530 THERAPEUTIC ACTIVITIES: CPT

## 2021-06-30 PROCEDURE — 97110 THERAPEUTIC EXERCISES: CPT

## 2021-06-30 PROCEDURE — 97162 PT EVAL MOD COMPLEX 30 MIN: CPT

## 2021-06-30 ASSESSMENT — PAIN SCALES - QUEBEC BACK PAIN DISABILITY SCALE
MOVE A CHAIR: 2
WALK SEVERAL KILOMETERS  OR MILES: 0
TURN OVER IN BED: 0
RIDE IN A CAR: 0
BEND OVER TO CLEAN THE BATHTUB: 4
WALK A FEW BLOCKS OR 300 TO 400M: 0
PUT ON SOCKS OR PANYHOSE: 1
MAKE YOUR BED: 1
RUN ONE BLOCK OR 100M: 1
STAND UP FOR 20 TO 30 MINUTES: 0
THROW A BALL: 0
QUEBEC CMS MODIFIER: CI
REACH UP TO HIGH SHELVES: 0
SLEEP THROUGH THE NIGHT: 0
TAKE FOOD OUT OF THE REFRIGERATOR: 1
CLIMB ONE FLIGHT OF STAIRS: 0
QUEBEC DISABILITY INDEX: 1-19%
SIT IN A CHAIR FOR SEVERAL HOURS: 0
TOTAL SCORE: 14
LIFT AND CARRY A HEAVY SUITCASE: 2
CARRY TWO BAGS OF GROCERIES: 1
PULL OR PUSH HEAVY DOORS: 1
GET OUT OF BED: 0

## 2021-06-30 NOTE — FLOWSHEET NOTE
[facility] [place]. [address]  Phone: [phone]   Fax:     [phone]    Physical Therapy Treatment Note/ Progress Report:     Date:  2021    Patient Name:  Dominick Vines    :  1950  MRN: [id number]    Pertinent Medical History:      Medical/Treatment Diagnosis Information:  · Diagnosis: M53.3 (ICD-10-CM) - Sacroiliac pain  · Treatment Diagnosis: Back pain, difficulty with ADL's, limited mobility,    Insurance/Certification information:  PT Insurance Information: [employer]  Physician Information:  Referring Practitioner: Erasmo Moe MD  Plan of care signed (Y/N): Sent to Dr. Hodan Martinez 21    Date of Patient follow up with Physician:      Progress Report: []  Yes  [x]  No     Date Range for reporting period:  Beginnin2021  Ending:    Progress report due (10 Rx/or 30 days whichever is less):    Recertification due (POC duration/ or 90 days whichever is less):    Visit # POC/ Insurance Allowable Auth Needed   1 8 []Yes    [x]No     Functional Scale:       Date Assessed: at eval  Test: Kimberleestlisa  Score: 14    Pain level:  0/10     History of Injury: Pt was \"working out\" on Nguyen 15, 2021 and was doing some particular exercises and the next day she felt back pain and buttock pain bilaterally. She has not had any serious back pain prior to that. Both knees have been replaced, left in  and right in 2017. She has always been active. She used aspercreme and Meloxicam, which have helped.     SUBJECTIVE:  See eval     OBJECTIVE: See eval   Observation:    Test measurements:      RESTRICTIONS/PRECAUTIONS:     Exercises/Interventions:     Therapeutic Ex (43713)   Min: Resistance/Reps Notes/Cues   Bridging 5 sec x 10    Clamshell 20X    LTR 2 min    Prone glut sets 5 sec x 10                             Manual Intervention (76702) Min:      Left ishial tub higher than Right, Left leg longer     Hip distraction Add   NMR re-education (93757)   Min:     Mf Activation- re-ed     TrA Re-ed activation     Glute Max re-ed activation          Therapeutic Activity (52525) Min:      Body mechanics add        Modalities  Min:             Other Therapeutic Activities:  Pt was educated on PT POC, Diagnosis, Prognosis, pathomechanics as well as frequency and duration of scheduling future physical therapy appointments. Time was also taken on this day to answer all patient questions and participation in PT. Reviewed appointment policy in detail with patient and patient verbalized understanding. Home Exercise Program:   Access Code: [email or web address]  URL: [email or web address]  Date: 06/30/2021  Prepared by: Rad Bishop     Exercises  Supine Bridge - 1 x daily - 7 x weekly - 3 sets - 10 reps  Clamshell - 1 x daily - 7 x weekly - 3 sets - 10 reps  Supine Lower Trunk Rotation - 1 x daily - 7 x weekly - 3 sets - 10 reps  Prone Gluteal Sets - 1 x daily - 7 x weekly - 3 sets - 10 reps     Therapeutic Exercise and NMR EXR  [x] (21760) Provided verbal/tactile cueing for activities related to strengthening, flexibility, endurance, ROM  for improvements in proximal hip and core control with self care, mobility, lifting and ambulation.  [] (32357) Provided verbal/tactile cueing for activities related to improving balance, coordination, kinesthetic sense, posture, motor skill, proprioception  to assist with core control in self care, mobility, lifting, and ambulation.      Therapeutic Activities:    [x] (07090 or 04496) Provided verbal/tactile cueing for activities related to improving balance, coordination, kinesthetic sense, posture, motor skill, proprioception and motor activation to allow for proper function  with self care and ADLs  [] (62042) Provided training and instruction to the patient for proper core and proximal hip recruitment and positioning with ambulation re-education     Home Exercise Program:    [x] (04632) Reviewed/Progressed HEP activities related to strengthening, flexibility, endurance, ROM of core, proximal hip and LE for functional self-care, mobility, lifting and ambulation   [] (70319) Reviewed/Progressed HEP activities related to improving balance, coordination, kinesthetic sense, posture, motor skill, proprioception of core, proximal hip and LE for self care, mobility, lifting, and ambulation      Manual Treatments:  PROM / STM / Oscillations-Mobs:  G-I, II, III, IV (PA's, Inf., Post.)  [] (49010) Provided manual therapy to mobilize proximal hip and LS spine soft tissue/joints for the purpose of modulating pain, promoting relaxation,  increasing ROM, reducing/eliminating soft tissue swelling/inflammation/restriction, improving soft tissue extensibility and allowing for proper ROM for normal function with self care, mobility, lifting and ambulation. Approval Dates:  CPT Code Units Approved Units Used  Date Updated:                     Charges:  Timed Code Treatment Minutes: 24   Total Treatment Minutes: 60     [] EVAL (LOW) 90569 (typically 20 minutes face-to-face)  [x] EVAL (MOD) 08185 (typically 30 minutes face-to-face)  [] EVAL (HIGH) 14585 (typically 45 minutes face-to-face)  [] RE-EVAL     [x] HQ(20545) x  1   [] Dry needle 1 or 2 Muscles (26692)  [] NMR (97710) x     [] Dry needle 3+ Muscles (43469)  [] Manual (62714) x     [] Ultrasound (65963) x  [x] TA (24619) x 1    [] Mech Traction (12632)  [] ES(attended) (00909)     [] ES (un) (80582):   [] Vasopump (46962) [] Ionto (71511)   [] Other:    GOALS:\"To learn back injury prevention\"  []? Progressing: []? Met: []? Not Met: []? Adjusted  Therapist goals for Patient:   Short Term Goals: To be achieved in: 2 weeks  1. Independent in HEP and progression per patient tolerance, in order to prevent re-injury. []? Progressing: []? Met: []? Not Met: []? Adjusted  2.  Patient will have a decrease in pain to facilitate progress  [] Patient is not progressing as expected and requires additional follow up with physician  [] Other:    Prognosis for POC: [x] Good [] Fair  [] Poor    Patient requires continued skilled intervention: [x] Yes  [] No        PLAN: See eval and points mentioned in assessment above. [] Continue per plan of care [] Alter current plan (see comments)  [x] Plan of care initiated [] Hold pending MD visit [] Discharge    Electronically signed by: Taylor Gilbert PT    Note: If patient does not return for scheduled/recommended follow up visits, this note will serve as a discharge from care along with the most recent update on progress.

## 2021-06-30 NOTE — PLAN OF CARE
[facility]. [address]  Phone: [phone]   Fax:     [phone]                                                       Physical Therapy Certification    Dear Referring Practitioner: Roger Son MD,    We had the pleasure of evaluating the following patient for physical therapy services at [facility]. A summary of our findings can be found in the initial assessment below. This includes our plan of care. If you have any questions or concerns regarding these findings, please do not hesitate to contact me at the office phone number checked above.   Thank you for the referral.       Physician Signature:_______________________________Date:__________________  By signing above (or electronic signature), therapists plan is approved by physician                  Patient: Keon Davila   : 1950   MRN: [id number]  Referring Physician: Referring Practitioner: Roger Son MD      Evaluation Date: 2021      Medical Diagnosis Information:  Diagnosis: M53.3 (ICD-10-CM) - Sacroiliac pain   Treatment Diagnosis: Back pain, difficulty with ADL's, limited mobility,                                         Insurance information: PT Insurance Information: [employer]na Medicare     Precautions/ Contra-indications:   Latex Allergy:  [x]NO      []YES  Preferred Language for Healthcare:   [x]English       []other:    C-SSRS Triggered by Intake questionnaire (Past 2 wk assessment ):   [x] No, Questionnaire did not trigger screening.   [] Yes, Patient intake triggered C-SSRS Screening      [] C-SSRS Screening completed  [] PCP notified via Epic     SUBJECTIVE: Patient stated complaint: Back pain    Relevant Medical History:   Functional Outcome Measure: [place] = 14    Pain Scale: 0/10  Easing factors: Aspercreme  Provocative factors: Bending, prolonged sitting    Type: []Constant   [x]Intermittent  []Radiating [x]Localized []other:     Numbness/Tingling: None    Occupation/School: Retired    Living Status/Prior Level of Function: Independent with ADLs and IADLs,     OBJECTIVE:   Posture: Erect posture, Right leg shorter than left leg    Functional Mobility/Transfers: Independent and not painful    Palpation: Not tender to palpation    Gait: (include devices/WB status) external rotation of both hips, right leg shorter (R leg=33 3/4 inches and L leg=34 1/4 , as measured from umbilicus to med malleolus)    Bandages/Dressings/Incisions: NA    Repeated Movements:    ROM  Comments   Lumbar Flex 108    Lumbar Ext 50      ROM LEFT RIGHT Comments   Lumbar Side Bend 33 35    Lumbar Rotation WNL WNL    Quadrant WNL WNL Right quadrant   Reproduced some right buttock pain   Hip Flexion WNL WNL    Hip Abd      Hip ER limited limited    Hip IR limited limite    Hip Extension      Knee Ext 0 0    Knee Flex 115 115    Hamstring Flex      Piriformis      Geovani test                Myotomes/Strength Normal Abnormal Comments   [x]ALL NORMAL      Hip Abd      Hip Ext      Hip flexion (L1-L2 femoral) [x] []    Knee extension (L2-L4 femoral) [x] []    Knee flexion (S1 sciatic)      Dorsiflexion (L4-L5 deep peroneal) [x] []    Great Toe Ext (L5 deep peroneal nerve) [x] []    Ankle Eversion (S1-S2 super peroneal) [] []    Ankle PF(S1-S2 tibial) [] []    Multifidus [] []    Transverse Ab [] []      Dermatomes Normal Abnormal Comments   [x]ALL NORMAL            inguinal area (L1)  [x] []    anterior mid-thigh (L2) [x] []    distal ant thigh/med knee (L3) [x] []    medial lower leg and foot (L4) [x] []    lateral lower leg and foot (L5) [x] []    posterior calf (S1) [] []    medial calcaneus (S2) [] []      Reflexes Normal Abnormal Comments   []ALL NORMAL            S1-2 Seated achilles [] [x]    S1-2 Prone knee bend [] []    L3-4 Patellar tendon [x] []    C5-6 Biceps [] []    C6 Brachioradialis [] [] C7-8 Triceps [] []    Clonus [] []    Babinski [] []    Tena's [] []      Joint mobility:    []Normal    [x]Hypo   []Hyper    Neurodynamics:     Orthopedic Special Tests:   Neural dynamic tension testing Normal Abnormal Comments   Slump Test  - Degree of knee flexion:  [] []    SLR  [] []    0-30 [] []    30-70 [] []    Femoral nerve (L2-4) [] []       Normal Abnormal N/A Comments   Fwd Bend-aberrant or innominate mvmt) [] [] []    Trendelenburg [] [] []    [place]/Quadrant [] [] []    [place] [] [] []    ALENA/Ross [] [] []    Hip scour [] [] []    Supine to sit [] [] []    Prone knee bend [] [] []           Hip thrust [] [] []    SI distraction/compression [] [] []    [place]/thrust [] [] []    Left andreea tub is higher and            [x] Patient history, allergies, meds reviewed. Medical chart reviewed. See intake form. Review Of Systems (ROS):  [x]Performed Review of systems (Integumentary, CardioPulmonary, Neurological) by intake and observation. Intake form has been scanned into medical record. Patient has been instructed to contact their primary care physician regarding ROS issues if not already being addressed at this time.       Co-morbidities/Complexities (which will affect course of rehabilitation):   []None           Arthritic conditions   []Rheumatoid arthritis (M05.9)  []Osteoarthritis (M19.91)   Cardiovascular conditions   [x]Hypertension (I10)  []Hyperlipidemia (E78.5)  []Angina pectoris (I20)  []Atherosclerosis (I70)  []CVA Musculoskeletal conditions   []Disc pathology   []Congenital spine pathologies   []Prior surgical intervention  []Osteoporosis (M81.8)  []Osteopenia (M85.8)   Endocrine conditions   []Hypothyroid (E03.9)  []Hyperthyroid Gastrointestinal conditions   []Constipation (U21.28)   Metabolic conditions   []Morbid obesity (E66.01)  []Diabetes type 1(E10.65) or 2 (E11.65)   []Neuropathy (G60.9)     Pulmonary conditions   []Asthma (J45)  []Coughing   []COPD (J44.9) Psychological Disorders  []Anxiety (F41.9)  []Depression (F32.9)   []Other:   [x]Other:   L TKR in 2008 and R TKR in 2017        Barriers to/and or personal factors that will affect rehab potential:              []Age  []Sex    []Smoker              []Motivation/Lack of Motivation                        []Co-Morbidities              []Cognitive Function, education/learning barriers              []Environmental, home barriers              []profession/work barriers  []past PT/medical experience  []other:  Justification:     Falls Risk Assessment (30 days):   [x] Falls Risk assessed and no intervention required.   [] Falls Risk assessed and Patient requires intervention due to being higher risk   TUG score (>12s at risk):     [] Falls education provided, including:         ASSESSMENT:   Functional Impairments:     [x]Noted lumbar/proximal hip hypomobility   []Noted lumbosacral and/or generalized hypermobility   []Decreased Lumbosacral/hip/LE functional ROM   []Decreased core/proximal hip strength and neuromuscular control    []Decreased LE functional strength    [x]Abnormal reflexes/sensation/myotomal/dermatomal deficits  []Reduced balance/proprioceptive control    []other:      Functional Activity Limitations (from functional questionnaire and intake)   []Reduced ability to tolerate prolonged functional positions   []Reduced ability or difficulty with changes of positions or transfers between positions   []Reduced ability to maintain good posture and demonstrate good body mechanics with sitting, bending, and lifting   []Reduced ability to sleep   [] Reduced ability or tolerance with driving and/or computer work   []Reduced ability to perform lifting, reaching, carrying tasks   [x]Reduced ability to squat   []Reduced ability to forward bend   []Reduced ability to ambulate prolonged functional periods/distances/surfaces   []Reduced ability to ascend/descend stairs   []other:       Participation Restrictions   []Reduced participation in self care activities   [x]Reduced participation in home management activities   []Reduced participation in work activities   []Reduced participation in social activities. []Reduced participation in sport/recreational activities. Classification:   []Signs/symptoms consistent with Lumbar instability/stabilization subgroup. []Signs/symptoms consistent with Lumbar mobilization/manipulation subgroup, myotomes and dermatomes intact. Meets manipulation criteria. [x]Signs/symptoms consistent with Lumbar direction specific/centralization subgroup   []Signs/symptoms consistent with Lumbar traction subgroup       []Signs/symptoms consistent with lumbar facet dysfunction   []Signs/symptoms consistent with lumbar stenosis type dysfunction   []Signs/symptoms consistent with nerve root involvement including myotome & dermatome dysfunction   []Signs/symptoms consistent with post-surgical status including: decreased ROM, strength and function.    []signs/symptoms consistent with pathology which may benefit from Dry needling     []other:      Prognosis/Rehab Potential:      []Excellent   [x]Good    []Fair   []Poor    Tolerance of evaluation/treatment:    []Excellent   [x]Good    []Fair   []Poor     Physical Therapy Evaluation Complexity Justification  [x] A history of present problem with:  [] no personal factors and/or comorbidities that impact the plan of care;  [x]1-2 personal factors and/or comorbidities that impact the plan of care  []3 personal factors and/or comorbidities that impact the plan of care  [x] An examination of body systems using standardized tests and measures addressing any of the following: body structures and functions (impairments), activity limitations, and/or participation restrictions;:  [] a total of 1-2 or more elements   [x] a total of 3 or more elements   [] a total of 4 or more elements   [x] A clinical presentation with:  [] stable and/or uncomplicated characteristics [x] evolving clinical presentation with changing characteristics  [] unstable and unpredictable characteristics;   [x] Clinical decision making of [] low, [x] moderate, [] high complexity using standardized patient assessment instrument and/or measurable assessment of functional outcome. [] EVAL (LOW) 22719 (typically 20 minutes face-to-face)  [x] EVAL (MOD) 60634 (typically 30 minutes face-to-face)  [] EVAL (HIGH) 86160 (typically 45 minutes face-to-face)  [] RE-EVAL     PLAN: Begin PT focusing on: proximal hip mobilizations, LB mobs, LB core activation, proximal hip activation, and HEP    Frequency/Duration:  2 days per week for 4 Weeks:  Interventions:  [x]  Therapeutic exercise including: strength training, ROM, for LE, Glutes and core   [x]  NMR activation and proprioception for glutes , LE and Core   [x]  Manual therapy as indicated for Hip complex, LE and spine to include: Dry Needling/IASTM, STM, PROM, Gr I-IV mobilizations, manipulation. [x]  Modalities as needed that may include: thermal agents, E-stim, Biofeedback, US, iontophoresis as indicated  [x]  Patient education on joint protection, postural re-education, activity modification, progression of HEP. HEP instruction:   Access Code: [email or web address]  URL: [email or web address]  Date: 06/30/2021  Prepared by: Abbie Garcia    Exercises  Supine Bridge - 1 x daily - 7 x weekly - 3 sets - 10 reps  Clamshell - 1 x daily - 7 x weekly - 3 sets - 10 reps  Supine Lower Trunk Rotation - 1 x daily - 7 x weekly - 3 sets - 10 reps  Prone Gluteal Sets - 1 x daily - 7 x weekly - 3 sets - 10 reps    GOALS:  Patient stated goal: \"To learn back injury prevention\"  [] Progressing: [] Met: [] Not Met: [] Adjusted  Therapist goals for Patient:   Short Term Goals: To be achieved in: 2 weeks  1. Independent in HEP and progression per patient tolerance, in order to prevent re-injury. [] Progressing: [] Met: [] Not Met: [] Adjusted  2.  Patient will have a decrease in pain to facilitate improvement in movement, function, and ADLs as indicated by Functional Deficits. [] Progressing: [] Met: [] Not Met: [] Adjusted    Long Term Goals: To be achieved in: 4 weeks  1. Disability index score of 1% or less for the ADRI to assist with reaching prior level of function. [] Progressing: [] Met: [] Not Met: [] Adjusted  2. Patient will demonstrate increased AROM to WNL, good LS mobility, good hip ROM to allow for proper joint functioning as indicated by patients Functional Deficits. [] Progressing: [] Met: [] Not Met: [] Adjusted  3. Patient will demonstrate an increase in Strength to good proximal hip and core activation to allow for proper functional mobility as indicated by patients Functional Deficits. [] Progressing: [] Met: [] Not Met: [] Adjusted  4. Patient will return to 99%  functional activities without increased symptoms or restriction. [] Progressing: [] Met: [] Not Met: [] Adjusted  5. Pt will be able to safely resume work outs to stay active and healthy(patient specific functional goal)    [] Progressing: [] Met: [] Not Met: [] Adjusted     Electronically signed by:  [place]unkett PT        Note: If patient does not return for scheduled/recommended follow up visits, this note will serve as a discharge from care along with the most recent update on progress.

## 2021-07-07 ENCOUNTER — OFFICE VISIT (OUTPATIENT)
Dept: PRIMARY CARE CLINIC | Age: 71
End: 2021-07-07
Payer: MEDICARE

## 2021-07-07 VITALS
SYSTOLIC BLOOD PRESSURE: 150 MMHG | OXYGEN SATURATION: 99 % | WEIGHT: 195.8 LBS | DIASTOLIC BLOOD PRESSURE: 80 MMHG | HEART RATE: 65 BPM | BODY MASS INDEX: 32.62 KG/M2 | HEIGHT: 65 IN | TEMPERATURE: 97.2 F

## 2021-07-07 DIAGNOSIS — Z63.0 STRESS DUE TO MARITAL PROBLEMS: ICD-10-CM

## 2021-07-07 DIAGNOSIS — H91.93 HEARING REDUCED, BILATERAL: ICD-10-CM

## 2021-07-07 DIAGNOSIS — I10 ESSENTIAL HYPERTENSION: ICD-10-CM

## 2021-07-07 DIAGNOSIS — Z00.00 ROUTINE GENERAL MEDICAL EXAMINATION AT A HEALTH CARE FACILITY: Primary | ICD-10-CM

## 2021-07-07 PROBLEM — F32.1 CURRENT MODERATE EPISODE OF MAJOR DEPRESSIVE DISORDER WITHOUT PRIOR EPISODE (HCC): Status: RESOLVED | Noted: 2020-06-30 | Resolved: 2021-07-07

## 2021-07-07 PROCEDURE — G0439 PPPS, SUBSEQ VISIT: HCPCS | Performed by: INTERNAL MEDICINE

## 2021-07-07 PROCEDURE — 99213 OFFICE O/P EST LOW 20 MIN: CPT | Performed by: INTERNAL MEDICINE

## 2021-07-07 RX ORDER — HYDROCHLOROTHIAZIDE 25 MG/1
25 TABLET ORAL DAILY
Qty: 90 TABLET | Refills: 5 | Status: SHIPPED
Start: 2021-07-07 | End: 2021-11-29 | Stop reason: SINTOL

## 2021-07-07 RX ORDER — SIMVASTATIN 20 MG
20 TABLET ORAL NIGHTLY
COMMUNITY
End: 2022-03-14

## 2021-07-07 RX ORDER — LABETALOL 200 MG/1
200 TABLET, FILM COATED ORAL 2 TIMES DAILY
Qty: 60 TABLET | Refills: 5 | Status: SHIPPED | OUTPATIENT
Start: 2021-07-07 | End: 2022-01-06 | Stop reason: SDUPTHER

## 2021-07-07 SDOH — SOCIAL STABILITY - SOCIAL INSECURITY: PROBLEMS IN RELATIONSHIP WITH SPOUSE OR PARTNER: Z63.0

## 2021-07-07 ASSESSMENT — PATIENT HEALTH QUESTIONNAIRE - PHQ9
SUM OF ALL RESPONSES TO PHQ QUESTIONS 1-9: 1
2. FEELING DOWN, DEPRESSED OR HOPELESS: 1
SUM OF ALL RESPONSES TO PHQ QUESTIONS 1-9: 1
SUM OF ALL RESPONSES TO PHQ QUESTIONS 1-9: 1
SUM OF ALL RESPONSES TO PHQ9 QUESTIONS 1 & 2: 1
1. LITTLE INTEREST OR PLEASURE IN DOING THINGS: 0

## 2021-07-07 ASSESSMENT — ENCOUNTER SYMPTOMS
CHEST TIGHTNESS: 0
EYES NEGATIVE: 1
SHORTNESS OF BREATH: 0
ALLERGIC/IMMUNOLOGIC NEGATIVE: 1
ABDOMINAL DISTENTION: 0
WHEEZING: 0
CONSTIPATION: 0

## 2021-07-07 ASSESSMENT — LIFESTYLE VARIABLES: HOW OFTEN DO YOU HAVE A DRINK CONTAINING ALCOHOL: 0

## 2021-07-07 NOTE — PROGRESS NOTES
knee 2009    Allergic/Immunologic: Negative. Negative for food allergies. Psychiatric/Behavioral: Negative for sleep disturbance. C/O Psychiatry / Ms Mae Lennon . Feeling better        Objective:   Physical Exam  Vitals reviewed. Constitutional:       Appearance: She is obese. Cardiovascular:      Rate and Rhythm: Normal rate and regular rhythm. Heart sounds: Normal heart sounds. Pulmonary:      Breath sounds: Normal breath sounds. No rales. Musculoskeletal:         General: Normal range of motion. Cervical back: No rigidity. Skin:     General: Skin is warm. Neurological:      Mental Status: She is oriented to person, place, and time. Psychiatric:         Mood and Affect: Affect is flat. Behavior: Behavior normal.         Thought Content: Thought content normal.         Cognition and Memory: Cognition normal.         Assessment:     Arleth Lainez was seen today for medicare awv, hypertension and stress. Diagnoses and all orders for this visit:    Routine general medical examination at a health care facility    Essential hypertension  BP up since last few visits . Increase Labetalol to 200 mg bid   -     labetalol (NORMODYNE) 200 MG tablet; Take 1 tablet by mouth 2 times daily  -     hydroCHLOROthiazide (HYDRODIURIL) 25 MG tablet; Take 1 tablet by mouth daily    Hearing reduced, bilateral  -     External Referral To Audiology    Stress due to marital problems  Advised to take a holiday    Hyperlipidemia, unspecified hyperlipidemia type  -     simvastatin (ZOCOR) 20 MG tablet; Take 1 tablet by mouth nightly    Gastroesophageal reflux disease without esophagitis on PPI . Pepcid for brkthru symptoms  -                             Plan:       Medicare Annual Wellness Visit  Name: Nikkie Moore Date: 2021   MRN: [id number] Sex: Female   Age: 69 y.o.  Ethnicity: Non-/Non    : 1950 Race: Black      Jonas Dakins is here for Medicare AWV and Hypertension    Screenings for behavioral, psychosocial and functional/safety risks, and cognitive dysfunction are all negative except as indicated below. These results, as well as other patient data from the [address] form, are documented in Flowsheets linked to this Encounter. Allergies   Allergen Reactions    Lisinopril Swelling    Tetracyclines & Related Itching and Rash     Bumps in mouth and inside of hands       Prior to Visit Medications    Medication Sig Taking?  Authorizing Provider   simvastatin (ZOCOR) 20 MG tablet Take 20 mg by mouth nightly Yes Historical Provider, MD   labetalol (NORMODYNE) 200 MG tablet Take 1 tablet by mouth 2 times daily Yes Damaris Mccullough MD   hydroCHLOROthiazide (HYDRODIURIL) 25 MG tablet Take 1 tablet by mouth daily Yes Damaris Mccullough MD   famotidine (PEPCID) 20 MG tablet Take 1 tablet by mouth 2 times daily as needed (heartburn) Yes NERY Capone   omeprazole (PRILOSEC) 40 MG delayed release capsule Take 1 capsule by mouth every morning (before breakfast) Yes Damaris Mccullough MD   RESTASIS 0.05 % ophthalmic emulsion  Yes Historical Provider, MD   Omega-3 Fatty Acids (FISH OIL CONCENTRATE PO) Take by mouth Yes Historical Provider, MD   fexofenadine (ALLEGRA) 180 MG tablet Take 180 mg by mouth daily Yes Historical Provider, MD   fluticasone (FLONASE) 50 MCG/ACT nasal spray 1 spray by Each Nostril route daily Yes Historical Provider, MD   acetaminophen (APAP EXTRA STRENGTH) 500 MG tablet Take 1 tablet by mouth every 6 hours as needed for Pain Yes Damaris Mccullough MD       Past Medical History:   Diagnosis Date    Acid reflux     Headache(784.0)     Hyperlipidemia     Hypertension     Osteoarthritis        Past Surgical History:   Procedure Laterality Date    ANKLE SURGERY      bone spur removed from achilles tendon  in  [address]     TKR 2008    KNEE ARTHROSCOPY      both knee    KNEE SURGERY      knee replacement in the left knee    OTHER SURGICAL HISTORY  01/23/2017    excision of back mass       History reviewed. No pertinent family history. CareTeam (Including outside providers/suppliers regularly involved in providing care):   Patient Care Team:  Dorita Blanco MD as PCP - General (Internal Medicine)  Dorita Blanco MD as PCP - [facility] Empaneled Provider    Wt Readings from Last 3 Encounters:   07/07/21 195 lb 12.8 oz (88.8 kg)   06/21/21 192 lb (87.1 kg)   04/02/21 193 lb (87.5 kg)     Vitals:    07/07/21 0800 07/07/21 0813 07/07/21 0837   BP: (!) 147/69 (!) 147/69 (!) 150/80   Pulse: 65     Temp: 97.2 °F (36.2 °C)     TempSrc: Skin     SpO2: 99%     Weight: 195 lb 12.8 oz (88.8 kg)     Height: 5' 5\" (1.651 m)       Body mass index is 32.58 kg/m². Based upon direct observation of the patient, evaluation of cognition reveals recent and remote memory intact. Patient's complete Health Risk Assessment and screening values have been reviewed and are found in Flowsheets. The following problems were reviewed today and where indicated follow up appointments were made and/or referrals ordered. Positive Risk Factor Screenings with Interventions:          General Health and ACP:  General  In general, how would you say your health is?: Very Good  In the past 7 days, have you experienced any of the following?  New or Increased Pain, New or Increased Fatigue, Loneliness, Social Isolation, Stress or Anger?: (!) Stress  Do you get the social and emotional support that you need?: Yes  Do you have a Living Will?: Yes  Advance Directives     Power of  Living Will ACP-Advance Directive ACP-Power of     Not on File Not on File Not on File Not on File      General Health Risk Interventions:  · Stress: patient's comments regarding reasons for stress and/or anger: Suffer's because of Husbands Personality issues    Health Habits/Nutrition:  Health Habits/Nutrition  Do you exercise for at least 20 minutes 2-3 times per week?: Yes  Have you lost any weight without trying in the past 3 months?: No  Do you eat only one meal per day?: No  Have you seen the dentist within the past year?: Yes  Body mass index: (!) 32.58  Health Habits/Nutrition Interventions:  · Inadequate physical activity:  patient agrees to increase physical activity as follows: 30 mins 2 x d   · Nutritional issues:  patient agrees to work toward a weight loss goal of 4 pounds over the next 4 week(s) using the following plan: low carbohydrate diet    Hearing/Vision:  No exam data present  Hearing/Vision  Do you or your family notice any trouble with your hearing that hasn't been managed with hearing aids?: (!) Yes  Do you have difficulty driving, watching TV, or doing any of your daily activities because of your eyesight?: No  Have you had an eye exam within the past year?: Yes  Hearing/Vision Interventions:  · Hearing concerns:  audiology referral provided      Personalized Preventive Plan   Current Health Maintenance Status  Immunization History   Administered Date(s) Administered    Influenza 10/24/2011, 10/15/2015    Influenza Vaccine, unspecified formulation 10/15/2015, 10/27/2016    Influenza Virus Vaccine 10/19/2009, 10/04/2010, 11/12/2013, 09/25/2014    Influenza, High Dose (Fluzone 65 yrs and older) 10/27/2016, 10/27/2017, 09/18/2018    Influenza, High-dose, Quadv, 65 yrs +, IM (Fluzone) 10/05/2020    Influenza, Intradermal, Preservative free 11/09/2012    Influenza, Triv, inactivated, subunit, adjuvanted, IM (Fluad 65 yrs and older) 10/03/2019    Pneumococcal Conjugate 13-valent ([id number]) 10/15/2015    Pneumococcal Polysaccharide ([id number]) 09/13/2008, 05/21/2019    Td, unspecified formulation 09/08/2009    Tdap (Boostrix, Adacel) 09/08/2009, 10/27/2016    Zoster Recombinant (Shingrix) 10/07/2019, 12/17/2019        Health Maintenance   Topic Date Due    Annual Wellness Visit (AWV)  Never done    Flu vaccine (1) 09/01/2021    A1C test (Diabetic or Prediabetic)  10/05/2021    Lipid screen  10/05/2021    Potassium monitoring  10/05/2021    Creatinine monitoring  10/05/2021    Breast cancer screen  04/08/2023    DTaP/Tdap/Td vaccine (2 - Td or Tdap) 10/27/2026    Colon cancer screen colonoscopy  06/03/2031    DEXA (modify frequency per FRAX score)  Completed    Shingles Vaccine  Completed    Pneumococcal 65+ years Vaccine  Completed    COVID-19 Vaccine  Completed    Hepatitis C screen  Completed    Hepatitis A vaccine  Aged Out    Hepatitis B vaccine  Aged Out    Hib vaccine  Aged Out    Meningococcal (ACWY) vaccine  Aged Out     Recommendations for [employer] Due: see orders and patient instructions/AVS.  .   Recommended screening schedule for the next 5-10 years is provided to the patient in written form: see Patient Instructions/AVS.

## 2021-07-08 ENCOUNTER — HOSPITAL ENCOUNTER (OUTPATIENT)
Dept: PHYSICAL THERAPY | Age: 71
Setting detail: THERAPIES SERIES
Discharge: HOME | End: 2021-07-08
Payer: MEDICARE

## 2021-07-08 PROCEDURE — 97110 THERAPEUTIC EXERCISES: CPT

## 2021-07-08 PROCEDURE — 97530 THERAPEUTIC ACTIVITIES: CPT

## 2021-07-08 PROCEDURE — 97140 MANUAL THERAPY 1/> REGIONS: CPT

## 2021-07-08 NOTE — FLOWSHEET NOTE
[facility] [place]. [address]  Phone: [phone]   Fax:     [phone]    Physical Therapy Treatment Note/ Progress Report:     Date:  2021    Patient Name:  Chasidy Duenas    :  1950  MRN: [id number]    Pertinent Medical History:      Medical/Treatment Diagnosis Information:          Insurance/Certification information:  PT Insurance Information: [employer]  Physician Information:  Referring Practitioner: Cleda Gowers, MD  Plan of care signed (Y/N): Sent to Dr. Andreina Dan 21    Date of Patient follow up with Physician:      Progress Report: []  Yes  [x]  No     Date Range for reporting period:  Beginnin2021  Ending:    Progress report due (10 Rx/or 30 days whichever is less):    Recertification due (POC duration/ or 90 days whichever is less):    Visit # POC/ Insurance Allowable Auth Needed   2 8 []Yes    [x]No     Functional Scale:       Date Assessed: at eval  Test: Tarenettastlisa  Score: 14    Pain level:  10     History of Injury: Pt was \"working out\" on Nguyen 15, 2021 and was doing some particular exercises and the next day she felt back pain and buttock pain bilaterally. She has not had any serious back pain prior to that. 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 She has always been active. She used aspercreme and Meloxicam, which have helped. SUBJECTIVE:  21 Patient reports her pain location changed,now is going around in front of the left hip and down the left leg some. States she still having some tenderness in low back too.       OBJECTIVE: See eval   Observation:    Test measurements:      RESTRICTIONS/PRECAUTIONS:     Exercises/Interventions:     Therapeutic Ex (39112)   Min: Resistance/Reps Notes/Cues   Bridging 5 sec x 10    Clamshell 20X    LTR 2 min    Prone glut sets 5 sec x 10    PPT with ball 5 sec x 10 Manual Intervention (23509) Min:      Left ishial tub higher than  Right, Left leg longer     Hip distraction gr 2,MFR to bilateral lumbar paraspinals,P/A mobs gr 2     NMR re-education (90521)   Min:     Mf Activation- re-ed     TrA Re-ed activation     Glute Max re-ed activation          Therapeutic Activity (01922) Min:      Body mechanics booklet issued. Reviewed lifting mecahnics Added 7/08/21        Modalities  Min:             Other Therapeutic Activities:  Pt was educated on PT POC, Diagnosis, Prognosis, pathomechanics as well as frequency and duration of scheduling future physical therapy appointments. Time was also taken on this day to answer all patient questions and participation in PT. Reviewed appointment policy in detail with patient and patient verbalized understanding. Home Exercise Program:   Access Code: [email or web address]  URL: [email or web address]  Date: 06/30/2021  Prepared by: Abner Ill     Exercises  Supine Bridge - 1 x daily - 7 x weekly - 3 sets - 10 reps  Clamshell - 1 x daily - 7 x weekly - 3 sets - 10 reps  Supine Lower Trunk Rotation - 1 x daily - 7 x weekly - 3 sets - 10 reps  Prone Gluteal Sets - 1 x daily - 7 x weekly - 3 sets - 10 reps     Therapeutic Exercise and NMR EXR  [x] (96196) Provided verbal/tactile cueing for activities related to strengthening, flexibility, endurance, ROM  for improvements in proximal hip and core control with self care, mobility, lifting and ambulation.  [] (11881) Provided verbal/tactile cueing for activities related to improving balance, coordination, kinesthetic sense, posture, motor skill, proprioception  to assist with core control in self care, mobility, lifting, and ambulation.      Therapeutic Activities:    [x] (69690 or 57963) Provided verbal/tactile cueing for activities related to improving balance, coordination, kinesthetic sense, posture, motor skill, proprioception and motor activation to allow for proper function  with self care and ADLs  [] (24250) Provided training and instruction to the patient for proper core and proximal hip recruitment and positioning with ambulation re-education     Home Exercise Program:    [x] (13699) Reviewed/Progressed HEP activities related to strengthening, flexibility, endurance, ROM of core, proximal hip and LE for functional self-care, mobility, lifting and ambulation   [] (30864) Reviewed/Progressed HEP activities related to improving balance, coordination, kinesthetic sense, posture, motor skill, proprioception of core, proximal hip and LE for self care, mobility, lifting, and ambulation      Manual Treatments:  PROM / STM / Oscillations-Mobs:  G-I, II, III, IV (PA's, Inf., Post.)  [] (96676) Provided manual therapy to mobilize proximal hip and LS spine soft tissue/joints for the purpose of modulating pain, promoting relaxation,  increasing ROM, reducing/eliminating soft tissue swelling/inflammation/restriction, improving soft tissue extensibility and allowing for proper ROM for normal function with self care, mobility, lifting and ambulation. Approval Dates:  CPT Code Units Approved Units Used  Date Updated:                     Charges:  Timed Code Treatment Minutes: 40   Total Treatment Minutes: 45     [] EVAL (LOW) 81168 (typically 20 minutes face-to-face)  [] EVAL (MOD) 76828 (typically 30 minutes face-to-face)  [] EVAL (HIGH) 56181 (typically 45 minutes face-to-face)  [] RE-EVAL     [x] MQ(75994) x  1   [] Dry needle 1 or 2 Muscles (94013)  [] NMR (74565) x     [] Dry needle 3+ Muscles (42534)  [x] Manual (19590) x  1   [] Ultrasound (30478) x  [x] TA (59065) x 1    [] Mech Traction (72326)  [] ES(attended) (37166)     [] ES (un) (83054):   [] Vasopump (74644) [] Ionto (90713)   [] Other:    GOALS:\"To learn back injury prevention\"  []? Progressing: []? Met: []? Not Met: []? Adjusted  Therapist goals for Patient:   Short Term Goals: To be achieved in: 2 weeks  1.  Independent in HEP and progression per patient tolerance, in order to prevent re-injury. []? Progressing: []? Met: []? Not Met: []? Adjusted  2. Patient will have a decrease in pain to facilitate improvement in movement, function, and ADLs as indicated by Functional Deficits. []? Progressing: []? Met: []? Not Met: []? Adjusted     Long Term Goals: To be achieved in: 4 weeks  1. Disability index score of 1% or less for the ADRI to assist with reaching prior level of function. []? Progressing: []? Met: []? Not Met: []? Adjusted  2. Patient will demonstrate increased AROM to WNL, good LS mobility, good hip ROM to allow for proper joint functioning as indicated by patients Functional Deficits. []? Progressing: []? Met: []? Not Met: []? Adjusted  3. Patient will demonstrate an increase in Strength to good proximal hip and core activation to allow for proper functional mobility as indicated by patients Functional Deficits. []? Progressing: []? Met: []? Not Met: []? Adjusted  4. Patient will return to 99%  functional activities without increased symptoms or restriction. []? Progressing: []? Met: []? Not Met: []? Adjusted  5. Pt will be able to safely resume work outs to stay active and healthy(patient specific functional goal)    []? Progressing: []? Met: []? Not Met: []? Adjusted          ASSESSMENT:  Patient has minimal pain now and is mostly interested in prevention. Will plan to start with basic core stabilization and advance as tolerated. Plan to advise as far as general exercise instructions and body mechanics as well. Treatment/Activity Tolerance:  [x] Patient tolerated treatment well [] Patient limited by fatique  [] Patient limited by pain  [] Patient limited by other medical complications  [] Other:     Overall Progression Towards Functional goals/ Treatment Progress Update:  [] Patient is progressing as expected towards functional goals listed. [] Progression is slowed due to complexities/Impairments listed.   [] Progression has been slowed due to co-morbidities. [x] Plan just implemented, too soon to assess goals progression <30days   [] Goals require adjustment due to lack of progress  [] Patient is not progressing as expected and requires additional follow up with physician  [] Other:    Prognosis for POC: [x] Good [] Fair  [] Poor    Patient requires continued skilled intervention: [x] Yes  [] No        PLAN: See eval and points mentioned in assessment above. [] Continue per plan of care [] Alter current plan (see comments)  [x] Plan of care initiated [] Hold pending MD visit [] Discharge    Electronically signed by: Evelyn Schaefer PTA    Note: If patient does not return for scheduled/recommended follow up visits, this note will serve as a discharge from care along with the most recent update on progress.

## 2021-07-13 ENCOUNTER — HOSPITAL ENCOUNTER (OUTPATIENT)
Dept: PHYSICAL THERAPY | Age: 71
Setting detail: THERAPIES SERIES
Discharge: HOME | End: 2021-07-13
Payer: MEDICARE

## 2021-07-13 PROCEDURE — 97140 MANUAL THERAPY 1/> REGIONS: CPT

## 2021-07-13 PROCEDURE — 97110 THERAPEUTIC EXERCISES: CPT

## 2021-07-13 PROCEDURE — 97530 THERAPEUTIC ACTIVITIES: CPT

## 2021-07-13 NOTE — FLOWSHEET NOTE
[facility]. [address]  Phone: [phone]   Fax:     [phone]    Physical Therapy Treatment Note/ Progress Report:     Date:  2021    Patient Name:  Mohit Lozoya    :  1950  MRN: [id number]    Pertinent Medical History:      Medical/Treatment Diagnosis Information:     ·   Diagnosis: M53.3 (ICD-10-CM) - Sacroiliac pain  · Treatment Diagnosis: Back pain, difficulty with ADL's, limited mobility,     Insurance/Certification information:  PT Insurance Information: t Medicare  Physician Information:  Referring Practitioner: Eugenio Vela MD  Plan of care signed (Y/N): Sent to Dr. Cezar Merritt 21    Date of Patient follow up with Physician:      Progress Report: []  Yes  [x]  No     Date Range for reporting period:  Beginnin2021  Ending:    Progress report due (10 Rx/or 30 days whichever is less):    Recertification due (POC duration/ or 90 days whichever is less):    Visit # POC/ Insurance Allowable Auth Needed   3 8 []Yes    [x]No     Functional Scale:       Date Assessed: at eval  Test: Earle  Score: 14    Pain level:  6-7/10     History of Injury: Pt was \"working out\" on Nguyen 15, 2021 and was doing some particular exercises and the next day she felt back pain and buttock pain bilaterally. She has not had any serious back pain prior to that. 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 She has always been active. She used aspercreme and Meloxicam, which have helped. SUBJECTIVE:  21 Patient reports her pain location changed,now is going around in front of the left hip and down the left leg some. States she still having some tenderness in low back too.   21: Pt reports that she is having more pain going down her left and right leg, R>L, which she wonders if it is due to side effect of  Meds.  She will bring this to her doctor's attention. Roger Colder OBJECTIVE: See eval   Observation:    Test measurements:      RESTRICTIONS/PRECAUTIONS:     Exercises/Interventions:     Therapeutic Ex (89848)   Min: Resistance/Reps Notes/Cues   Bridging 5 sec x 10    Clamshell 20X    LTR 2 min    Prone glut sets 5 sec x 10    PPT with ball 5 sec x 10    Nustep 5 min                   Manual Intervention (43385) Min:      Left ishial tub higher than  Right, Left leg longer     Hip distraction gr 2,MFR to bilateral lumbar paraspinals,P/A mobs gr 2     NMR re-education (26234)   Min:     Mf Activation- re-ed     TrA Re-ed activation     Glute Max re-ed activation          Therapeutic Activity (70878) Min:      Body mechanics booklet issued. Reviewed lifting mechanics  7/13/21: gave body mechanics booklet and reviewed/practiced principles. Added 7/08/21 and continued on 7/13/21         Modalities  Min:             Other Therapeutic Activities:  Pt was educated on PT POC, Diagnosis, Prognosis, pathomechanics as well as frequency and duration of scheduling future physical therapy appointments. Time was also taken on this day to answer all patient questions and participation in PT. Reviewed appointment policy in detail with patient and patient verbalized understanding. Home Exercise Program:   Access Code: [email or web address]  URL: [email or web address]  Date: 06/30/2021  Prepared by:  Anila Nice     Exercises  Supine Bridge - 1 x daily - 7 x weekly - 3 sets - 10 reps  Clamshell - 1 x daily - 7 x weekly - 3 sets - 10 reps  Supine Lower Trunk Rotation - 1 x daily - 7 x weekly - 3 sets - 10 reps  Prone Gluteal Sets - 1 x daily - 7 x weekly - 3 sets - 10 reps     Therapeutic Exercise and NMR EXR  [x] (94213) Provided verbal/tactile cueing for activities related to strengthening, flexibility, endurance, ROM  for improvements in proximal hip and core control with self care, mobility, lifting and ambulation.  [] (25647) Provided verbal/tactile cueing for activities related to improving balance, coordination, kinesthetic sense, posture, motor skill, proprioception  to assist with core control in self care, mobility, lifting, and ambulation. Therapeutic Activities:    [x] (50955 or 46391) Provided verbal/tactile cueing for activities related to improving balance, coordination, kinesthetic sense, posture, motor skill, proprioception and motor activation to allow for proper function  with self care and ADLs  [] (97864) Provided training and instruction to the patient for proper core and proximal hip recruitment and positioning with ambulation re-education     Home Exercise Program:    [x] (41384) Reviewed/Progressed HEP activities related to strengthening, flexibility, endurance, ROM of core, proximal hip and LE for functional self-care, mobility, lifting and ambulation   [] (59220) Reviewed/Progressed HEP activities related to improving balance, coordination, kinesthetic sense, posture, motor skill, proprioception of core, proximal hip and LE for self care, mobility, lifting, and ambulation      Manual Treatments:  PROM / STM / Oscillations-Mobs:  G-I, II, III, IV (PA's, Inf., Post.)  [] (10192) Provided manual therapy to mobilize proximal hip and LS spine soft tissue/joints for the purpose of modulating pain, promoting relaxation,  increasing ROM, reducing/eliminating soft tissue swelling/inflammation/restriction, improving soft tissue extensibility and allowing for proper ROM for normal function with self care, mobility, lifting and ambulation.      Approval Dates:  CPT Code Units Approved Units Used  Date Updated:                     Charges:  Timed Code Treatment Minutes: 45   Total Treatment Minutes: 45     [] EVAL (LOW) 92374 (typically 20 minutes face-to-face)  [] EVAL (MOD) 35779 (typically 30 minutes face-to-face)  [] EVAL (HIGH) 14029 (typically 45 minutes face-to-face)  [] RE-EVAL     [x] XC(31728) x  1   [] Dry needle 1 or 2 Muscles (68783)  [] NMR (60951) x     [] Dry needle 3+ Muscles (64203)  [x] Manual (02267) x  1   [] Ultrasound (82959) x  [x] TA (97068) x 1    [] Mech Traction (36118)  [] ES(attended) (47959)     [] ES (un) (60488):   [] Vasopump (77986) [] Ionto (52521)   [] Other:    GOALS:\"To learn back injury prevention\"  []? Progressing: []? Met: []? Not Met: []? Adjusted  Therapist goals for Patient:   Short Term Goals: To be achieved in: 2 weeks  1. Independent in HEP and progression per patient tolerance, in order to prevent re-injury. []? Progressing: []? Met: []? Not Met: []? Adjusted  2. Patient will have a decrease in pain to facilitate improvement in movement, function, and ADLs as indicated by Functional Deficits. []? Progressing: []? Met: []? Not Met: []? Adjusted     Long Term Goals: To be achieved in: 4 weeks  1. Disability index score of 1% or less for the ADRI to assist with reaching prior level of function. []? Progressing: []? Met: []? Not Met: []? Adjusted  2. Patient will demonstrate increased AROM to WNL, good LS mobility, good hip ROM to allow for proper joint functioning as indicated by patients Functional Deficits. []? Progressing: []? Met: []? Not Met: []? Adjusted  3. Patient will demonstrate an increase in Strength to good proximal hip and core activation to allow for proper functional mobility as indicated by patients Functional Deficits. []? Progressing: []? Met: []? Not Met: []? Adjusted  4. Patient will return to 99%  functional activities without increased symptoms or restriction. []? Progressing: []? Met: []? Not Met: []? Adjusted  5. Pt will be able to safely resume work outs to stay active and healthy(patient specific functional goal)    []? Progressing: []? Met: []? Not Met: []? Adjusted          ASSESSMENT:  Patient has minimal pain now and is mostly interested in prevention. Will plan to start with basic core stabilization and advance as tolerated.  Plan to advise as far as general exercise instructions and body mechanics as well. Treatment/Activity Tolerance:  [x] Patient tolerated treatment well [] Patient limited by fatique  [] Patient limited by pain  [] Patient limited by other medical complications  [] Other:     Overall Progression Towards Functional goals/ Treatment Progress Update:  [] Patient is progressing as expected towards functional goals listed. [] Progression is slowed due to complexities/Impairments listed. [] Progression has been slowed due to co-morbidities. [x] Plan just implemented, too soon to assess goals progression <30days   [] Goals require adjustment due to lack of progress  [] Patient is not progressing as expected and requires additional follow up with physician  [] Other:    Prognosis for POC: [x] Good [] Fair  [] Poor    Patient requires continued skilled intervention: [x] Yes  [] No        PLAN: See eval and points mentioned in assessment above. [x] Continue per plan of care [] Alter current plan (see comments)  [] Plan of care initiated [] Hold pending MD visit [] Discharge    Electronically signed by: North Plunkett PT    Note: If patient does not return for scheduled/recommended follow up visits, this note will serve as a discharge from care along with the most recent update on progress.

## 2021-07-15 ENCOUNTER — HOSPITAL ENCOUNTER (OUTPATIENT)
Dept: PHYSICAL THERAPY | Age: 71
Setting detail: THERAPIES SERIES
Discharge: HOME | End: 2021-07-15
Payer: MEDICARE

## 2021-07-15 PROCEDURE — 97112 NEUROMUSCULAR REEDUCATION: CPT

## 2021-07-15 PROCEDURE — 97110 THERAPEUTIC EXERCISES: CPT

## 2021-07-15 PROCEDURE — 97035 APP MDLTY 1+ULTRASOUND EA 15: CPT

## 2021-07-15 NOTE — FLOWSHEET NOTE
[address] [facility] [place]. [place] [address]  Phone: [phone]   Fax:     [phone]    Physical Therapy Treatment Note/ Progress Report:     Date:  7/15/2021    Patient Name:  Cedric Marin    :  1950  MRN: [id number]    Pertinent Medical History:      Medical/Treatment Diagnosis Information:     ·   Diagnosis: M53.3 (ICD-10-CM) - Sacroiliac pain  · Treatment Diagnosis: Back pain, difficulty with ADL's, limited mobility,     Insurance/Certification information:  PT Insurance Information: [employer]  Physician Information:  Referring Practitioner: Alex Aparicio MD  Plan of care signed (Y/N): Sent to Dr. Astrid Ferguson 21    Date of Patient follow up with Physician:      Progress Report: []  Yes  [x]  No     Date Range for reporting period:  Beginnin2021  Ending:    Progress report due (10 Rx/or 30 days whichever is less):    Recertification due (POC duration/ or 90 days whichever is less):    Visit # POC/ Insurance Allowable Auth Needed   4 8 []Yes    [x]No     Functional Scale:       Date Assessed: at eval  Test: Earle  Score: 14    Pain level:  6-7/10 at night and 3/10 during the day     History of Injury: Pt was \"working out\" on Nguyen 15, 2021 and was doing some particular exercises and the next day she felt back pain and buttock pain bilaterally. She has not had any serious back pain prior to that. 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 She has always been active. She used aspercreme and Meloxicam, which have helped. SUBJECTIVE:  21 Patient reports her pain location changed,now is going around in front of the left hip and down the left leg some. States she still having some tenderness in low back too.   21: Pt reports that she is having more pain going down her left and right leg, R>L, which she wonders if it is due to side effect of  Meds. She will bring this to her doctor's attention. [facility] Sepulveda OBJECTIVE: See eval   Observation:    Test measurements:      RESTRICTIONS/PRECAUTIONS:     Exercises/Interventions:     Therapeutic Ex (85524)   Min: Resistance/Reps Notes/Cues   Bridging 5 sec x 10    Clamshell 20X    LTR 2 min    Prone glut sets 5 sec x 10    PPT with ball 5 sec x 10    Nustep 5 min                   Manual Intervention (89703) Min:             NMR re-education (93970)   Min:     Mf Activation- re-ed     TrA Re-ed activation     Glute Max re-ed activation Exercise ball -seated for for and back/side to side Prone on ball for hip extension and half push ups, each x 10         Therapeutic Activity (24384) Min:      Body mechanics booklet issued. Reviewed lifting mechanics  7/13/21: gave body mechanics booklet and reviewed/practiced principles. Added 7/08/21 and continued on 7/13/21         Modalities  Min:       Ultrasound x 8 min to lumbar area at 1.3 w/cm2      Other Therapeutic Activities:  Pt was educated on PT POC, Diagnosis, Prognosis, pathomechanics as well as frequency and duration of scheduling future physical therapy appointments. Time was also taken on this day to answer all patient questions and participation in PT. Reviewed appointment policy in detail with patient and patient verbalized understanding. Home Exercise Program:   Access Code: [email or web address]  URL: [email or web address]  Date: 06/30/2021  Prepared by:  Simpson Canavan     Exercises  Supine Bridge - 1 x daily - 7 x weekly - 3 sets - 10 reps  Clamshell - 1 x daily - 7 x weekly - 3 sets - 10 reps  Supine Lower Trunk Rotation - 1 x daily - 7 x weekly - 3 sets - 10 reps  Prone Gluteal Sets - 1 x daily - 7 x weekly - 3 sets - 10 reps     Therapeutic Exercise and NMR EXR  [x] (33649) Provided verbal/tactile cueing for activities related to strengthening, flexibility, endurance, ROM  for improvements in proximal hip and core control with self care, mobility, lifting and ambulation.  [] (73713) Provided verbal/tactile cueing for activities related to improving balance, coordination, kinesthetic sense, posture, motor skill, proprioception  to assist with core control in self care, mobility, lifting, and ambulation. Therapeutic Activities:    [x] (93423 or 27585) Provided verbal/tactile cueing for activities related to improving balance, coordination, kinesthetic sense, posture, motor skill, proprioception and motor activation to allow for proper function  with self care and ADLs  [] (37992) Provided training and instruction to the patient for proper core and proximal hip recruitment and positioning with ambulation re-education     Home Exercise Program:    [x] (79224) Reviewed/Progressed HEP activities related to strengthening, flexibility, endurance, ROM of core, proximal hip and LE for functional self-care, mobility, lifting and ambulation   [] (05966) Reviewed/Progressed HEP activities related to improving balance, coordination, kinesthetic sense, posture, motor skill, proprioception of core, proximal hip and LE for self care, mobility, lifting, and ambulation      Manual Treatments:  PROM / STM / Oscillations-Mobs:  G-I, II, III, IV (PA's, Inf., Post.)  [] (40274) Provided manual therapy to mobilize proximal hip and LS spine soft tissue/joints for the purpose of modulating pain, promoting relaxation,  increasing ROM, reducing/eliminating soft tissue swelling/inflammation/restriction, improving soft tissue extensibility and allowing for proper ROM for normal function with self care, mobility, lifting and ambulation.      Approval Dates:  CPT Code Units Approved Units Used  Date Updated:                     Charges:  Timed Code Treatment Minutes: 45   Total Treatment Minutes: 45     [] EVAL (LOW) 87077 (typically 20 minutes face-to-face)  [] EVAL (MOD) 99941 (typically 30 minutes face-to-face)  [] EVAL (HIGH) 31231 (typically 45 minutes face-to-face)  [] RE-EVAL     [x] FP(71232) x  1   [] Dry needle 1 or 2 Muscles (02057)  [x] NMR (06714) x 1   [] Dry needle 3+ Muscles (17712)  [] Manual (02652) x  1   [x] Ultrasound (26254) x 8 min  [] TA (70112) x 1    [] Mech Traction (34642)  [] ES(attended) (92715)     [] ES (un) (43681):   [] Vasopump (36525) [] Ionto (50897)   [] Other:    GOALS:\"To learn back injury prevention\"  []? Progressing: []? Met: []? Not Met: []? Adjusted  Therapist goals for Patient:   Short Term Goals: To be achieved in: 2 weeks  1. Independent in HEP and progression per patient tolerance, in order to prevent re-injury. []? Progressing: []? Met: []? Not Met: []? Adjusted  2. Patient will have a decrease in pain to facilitate improvement in movement, function, and ADLs as indicated by Functional Deficits. []? Progressing: []? Met: []? Not Met: []? Adjusted     Long Term Goals: To be achieved in: 4 weeks  1. Disability index score of 1% or less for the ADRI to assist with reaching prior level of function. []? Progressing: []? Met: []? Not Met: []? Adjusted  2. Patient will demonstrate increased AROM to WNL, good LS mobility, good hip ROM to allow for proper joint functioning as indicated by patients Functional Deficits. []? Progressing: []? Met: []? Not Met: []? Adjusted  3. Patient will demonstrate an increase in Strength to good proximal hip and core activation to allow for proper functional mobility as indicated by patients Functional Deficits. []? Progressing: []? Met: []? Not Met: []? Adjusted  4. Patient will return to 99%  functional activities without increased symptoms or restriction. []? Progressing: []? Met: []? Not Met: []? Adjusted  5. Pt will be able to safely resume work outs to stay active and healthy(patient specific functional goal)    []? Progressing: []? Met: []? Not Met: []? Adjusted          ASSESSMENT:  Patient has minimal pain now and is mostly interested in prevention.   Will plan to start with basic core stabilization and advance as tolerated. Plan to advise as far as general exercise instructions and body mechanics as well. Treatment/Activity Tolerance:  [x] Patient tolerated treatment well [] Patient limited by fatique  [] Patient limited by pain  [] Patient limited by other medical complications  [] Other:     Overall Progression Towards Functional goals/ Treatment Progress Update:  [] Patient is progressing as expected towards functional goals listed. [] Progression is slowed due to complexities/Impairments listed. [] Progression has been slowed due to co-morbidities. [x] Plan just implemented, too soon to assess goals progression <30days   [] Goals require adjustment due to lack of progress  [] Patient is not progressing as expected and requires additional follow up with physician  [] Other:    Prognosis for POC: [x] Good [] Fair  [] Poor    Patient requires continued skilled intervention: [x] Yes  [] No        PLAN: See eval and points mentioned in assessment above. Assess benefit of US. [x] Continue per plan of care [] Alter current plan (see comments)  [] Plan of care initiated [] Hold pending MD visit [] Discharge    Electronically signed by: Caitlin Felix PT    Note: If patient does not return for scheduled/recommended follow up visits, this note will serve as a discharge from care along with the most recent update on progress.

## 2021-07-20 ENCOUNTER — ANESTHESIA EVENT (OUTPATIENT)
Dept: ENDOSCOPY | Age: 71
End: 2021-07-20
Payer: MEDICARE

## 2021-07-20 ENCOUNTER — HOSPITAL ENCOUNTER (OUTPATIENT)
Dept: PHYSICAL THERAPY | Age: 71
Setting detail: THERAPIES SERIES
Discharge: HOME | End: 2021-07-20
Payer: MEDICARE

## 2021-07-20 PROCEDURE — 97110 THERAPEUTIC EXERCISES: CPT

## 2021-07-20 PROCEDURE — 97112 NEUROMUSCULAR REEDUCATION: CPT

## 2021-07-20 PROCEDURE — 97035 APP MDLTY 1+ULTRASOUND EA 15: CPT

## 2021-07-20 RX ORDER — PREDNISONE 1 MG/1
1 TABLET ORAL DAILY
COMMUNITY
End: 2021-09-21

## 2021-07-20 RX ORDER — CYCLOBENZAPRINE HCL 5 MG
5 TABLET ORAL 3 TIMES DAILY PRN
COMMUNITY
End: 2022-03-19

## 2021-07-20 NOTE — PROGRESS NOTES
[address] [facility] time__1030__________        Surgery time____1200________    Take the following medications with a sip of water:    Do not eat or drink anything after 12:00 midnight prior to your surgery. This includes water chewing gum, mints and ice chips. You may brush your teeth and gargle the morning of your surgery, but do not swallow the water     Please see your family doctor/pediatrician for a history and physical and/or concerning medications. Bring any test results/reports from your physicians office. If you are under the care of a heart doctor or specialist doctor, please be aware that you may be asked to them for clearance    You may be asked to stop blood thinners such as Coumadin, Plavix, Fragmin, Lovenox, etc., or any anti-inflammatories such as:  Aspirin, Ibuprofen, Advil, Naproxen prior to your surgery. We also ask that you stop any OTC medications such as fish oil, vitamin E, glucosamine, garlic, Multivitamins, COQ 10, etc.    We ask that you do not smoke 24 hours prior to surgery  We ask that you do not  drink any alcoholic beverages 24 hours prior to surgery     You must make arrangements for a responsible adult to take you home after your surgery. For your safety you will not be allowed to leave alone or drive yourself home. Your surgery will be cancelled if you do not have a ride home. Also for your safety, it is strongly suggested that someone stay with you the first 24 hours after your surgery. A parent or legal guardian must accompany a child scheduled for surgery and plan to stay at the hospital until the child is discharged. Please do not bring other children with you. For your comfort, please wear simple loose fitting clothing to the hospital.  Please do not bring valuables.     Do not wear any make-up or nail polish on your fingers or toes      For your safety, please do not wear any jewelry or body piercing's on the day of surgery. All jewelry must be removed. If you have dentures, they will be removed before going to operating room. For your convenience, we will provide you with a container. If you wear contact lenses or glasses, they will be removed, please bring a case for them. If you have a living will and a durable power of  for healthcare, please bring in a copy. As part of our patient safety program to minimize surgical site infections, we ask you to do the following:    · Please notify your surgeon if you develop any illness between         now and the  day of your surgery. · This includes a cough, cold, fever, sore throat, nausea,         or vomiting, and diarrhea, etc.  ·  Please notify your surgeon if you experience dizziness, shortness         of breath or blurred vision between now and the time of your surgery. Do not shave your operative site 96 hours prior to surgery. For face and neck surgery, men may use an electric razor 48 hours   prior to surgery. You may shower the night before surgery or the morning of   your surgery with an antibacterial soap. You will need to bring a photo ID and insurance card    [facility] has an onsite pharmacy, would you like to utilize our pharmacy     If you will be staying overnight and use a C-pap machine, please bring   your C-pap to hospital     Our goal is to provide you with excellent care, therefore, visitors will be limited to two(2) in the room at a time so that we may focus on providing this care for you. Please contact pre-admission testing if you have any further questions. [facility] phone number:  [phone] [facility] fax number:  [phone]  Please note these are generalized instructions for all surgical cases, you may be provided with more specific instructions according to your surgery.     Safety First!  Call before you call!!

## 2021-07-20 NOTE — FLOWSHEET NOTE
[facility] [place]. [place] [address]  Phone: [phone]   Fax:     [phone]    Physical Therapy Treatment Note/ Progress Report:     Date:  2021    Patient Name:  Stella Rizvi    :  1950  MRN: [id number]    Pertinent Medical History:      Medical/Treatment Diagnosis Information:     ·   Diagnosis: M53.3 (ICD-10-CM) - Sacroiliac pain  · Treatment Diagnosis: Back pain, difficulty with ADL's, limited mobility,     Insurance/Certification information:  PT Insurance Information: [employer]  Physician Information:  Referring Practitioner: Gregorio Renee MD  Plan of care signed (Y/N): Sent to Dr. Sebas Vernon 21    Date of Patient follow up with Physician:      Progress Report: []  Yes  [x]  No     Date Range for reporting period:  Beginnin2021  Ending:    Progress report due (10 Rx/or 30 days whichever is less):    Recertification due (POC duration/ or 90 days whichever is less):    Visit # POC/ Insurance Allowable Auth Needed   5 8 []Yes    [x]No     Functional Scale:       Date Assessed: at eval  Test: Earle  Score: 14    Pain level:  3/10 now     History of Injury: Pt was \"working out\" on Nguyen 15, 2021 and was doing some particular exercises and the next day she felt back pain and buttock pain bilaterally. She has not had any serious back pain prior to that. 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 She has always been active. She used aspercreme and Meloxicam, which have helped. SUBJECTIVE:  21 Patient reports her pain location changed,now is going around in front of the left hip and down the left leg some. States she still having some tenderness in low back too.   21: Pt reports that she is having more pain going down her left and right leg, R>L, which she wonders if it is due to side effect of  Meds.  She will bring this to her doctor's attention. .  7/20/21: Pt reports that she went to Urgent care over the weekend with her back pain and leg pain, It was up to a 10/10 on Sunday, she reports. She is currently on a Medrol  Dose pack. , which is helpful.  OBJECTIVE: See eval   Observation:    Test measurements:      RESTRICTIONS/PRECAUTIONS:     Exercises/Interventions:     Therapeutic Ex (50001)   Min: Resistance/Reps Notes/Cues   Bridging 5 sec x 10    Clamshell 20X 7/20/21: Issued red band for resistance   LTR 2 min    Prone glut sets 5 sec x 10    PPT with ball 5 sec x 10    Nustep 5 min    PPT with fall outs 5 x each side Added 7/20/21, +HEP             Manual Intervention (79643) Min:             NMR re-education (79113)   Min:     Mf Activation- re-ed     TrA Re-ed activation     Glute Max re-ed activation Exercise ball -seated for for and back/side to side Prone on ball for hip extension and half push ups, each x 10         Therapeutic Activity (40564) Min:      Added 7/08/21 and continued on 7/13/21         Modalities  Min:       Ultrasound x 8 min to lumbar area at 1.3 w/cm2      Other Therapeutic Activities:  Pt was educated on PT POC, Diagnosis, Prognosis, pathomechanics as well as frequency and duration of scheduling future physical therapy appointments. Time was also taken on this day to answer all patient questions and participation in PT. Reviewed appointment policy in detail with patient and patient verbalized understanding. Home Exercise Program:   Access Code: [email or web address]  URL: [email or web address]  Date: 06/30/2021  Prepared by: Delia Llamas     Exercises  Supine Bridge - 1 x daily - 7 x weekly - 3 sets - 10 reps  Clamshell - 1 x daily - 7 x weekly - 3 sets - 10 reps  Supine Lower Trunk Rotation - 1 x daily - 7 x weekly - 3 sets - 10 reps  Prone Gluteal Sets - 1 x daily - 7 x weekly - 3 sets - 10 reps     Access Code: [email or web address]  URL: [email or web address]  Date: 07/20/2021  Prepared by:  Otoniel Garcia Mike    Exercises  Supine Single Bent Knee Fallout - 1 x daily - 7 x weekly - 3 sets - 10 reps    Therapeutic Exercise and NMR EXR  [x] (85806) Provided verbal/tactile cueing for activities related to strengthening, flexibility, endurance, ROM  for improvements in proximal hip and core control with self care, mobility, lifting and ambulation.  [] (97672) Provided verbal/tactile cueing for activities related to improving balance, coordination, kinesthetic sense, posture, motor skill, proprioception  to assist with core control in self care, mobility, lifting, and ambulation. Therapeutic Activities:    [x] (04473 or 01571) Provided verbal/tactile cueing for activities related to improving balance, coordination, kinesthetic sense, posture, motor skill, proprioception and motor activation to allow for proper function  with self care and ADLs  [] (52712) Provided training and instruction to the patient for proper core and proximal hip recruitment and positioning with ambulation re-education     Home Exercise Program:    [x] (61425) Reviewed/Progressed HEP activities related to strengthening, flexibility, endurance, ROM of core, proximal hip and LE for functional self-care, mobility, lifting and ambulation   [] (34242) Reviewed/Progressed HEP activities related to improving balance, coordination, kinesthetic sense, posture, motor skill, proprioception of core, proximal hip and LE for self care, mobility, lifting, and ambulation      Manual Treatments:  PROM / STM / Oscillations-Mobs:  G-I, II, III, IV (PA's, Inf., Post.)  [] (52118) Provided manual therapy to mobilize proximal hip and LS spine soft tissue/joints for the purpose of modulating pain, promoting relaxation,  increasing ROM, reducing/eliminating soft tissue swelling/inflammation/restriction, improving soft tissue extensibility and allowing for proper ROM for normal function with self care, mobility, lifting and ambulation.      Approval Dates:  CPT Code Units Approved Units Used  Date Updated:                     Charges:  Timed Code Treatment Minutes: 45   Total Treatment Minutes: 45     [] EVAL (LOW) 54695 (typically 20 minutes face-to-face)  [] EVAL (MOD) 55814 (typically 30 minutes face-to-face)  [] EVAL (HIGH) 50769 (typically 45 minutes face-to-face)  [] RE-EVAL     [x] ZK(29649) x  1   [] Dry needle 1 or 2 Muscles (98330)  [x] NMR (24330) x 1   [] Dry needle 3+ Muscles (26175)  [] Manual (79561) x  1   [x] Ultrasound (29556) x 8 min  [] TA (34052) x 1    [] Mech Traction (88593)  [] ES(attended) (08846)     [] ES (un) (44424):   [] Vasopump (96018) [] Ionto (79351)   [] Other:    GOALS:\"To learn back injury prevention\"  []? Progressing: []? Met: []? Not Met: []? Adjusted  Therapist goals for Patient:   Short Term Goals: To be achieved in: 2 weeks  1. Independent in HEP and progression per patient tolerance, in order to prevent re-injury. []? Progressing: []? Met: []? Not Met: []? Adjusted  2. Patient will have a decrease in pain to facilitate improvement in movement, function, and ADLs as indicated by Functional Deficits. []? Progressing: []? Met: []? Not Met: []? Adjusted     Long Term Goals: To be achieved in: 4 weeks  1. Disability index score of 1% or less for the ADRI to assist with reaching prior level of function. []? Progressing: []? Met: []? Not Met: []? Adjusted  2. Patient will demonstrate increased AROM to WNL, good LS mobility, good hip ROM to allow for proper joint functioning as indicated by patients Functional Deficits. []? Progressing: []? Met: []? Not Met: []? Adjusted  3. Patient will demonstrate an increase in Strength to good proximal hip and core activation to allow for proper functional mobility as indicated by patients Functional Deficits. []? Progressing: []? Met: []? Not Met: []? Adjusted  4. Patient will return to 99%  functional activities without increased symptoms or restriction. []? Progressing: []? Met: []?  Not Met: []? Adjusted  5. Pt will be able to safely resume work outs to stay active and healthy(patient specific functional goal)    []? Progressing: []? Met: []? Not Met: []? Adjusted          ASSESSMENT:  Patient has minimal pain now and is mostly interested in prevention. Will plan to start with basic core stabilization and advance as tolerated. Plan to advise as far as general exercise instructions and body mechanics as well. Treatment/Activity Tolerance:  [x] Patient tolerated treatment well [] Patient limited by fatique  [] Patient limited by pain  [] Patient limited by other medical complications  [] Other:     Overall Progression Towards Functional goals/ Treatment Progress Update:  [] Patient is progressing as expected towards functional goals listed. [] Progression is slowed due to complexities/Impairments listed. [] Progression has been slowed due to co-morbidities. [x] Plan just implemented, too soon to assess goals progression <30days   [] Goals require adjustment due to lack of progress  [] Patient is not progressing as expected and requires additional follow up with physician  [] Other:    Prognosis for POC: [x] Good [] Fair  [] Poor    Patient requires continued skilled intervention: [x] Yes  [] No        PLAN: See eval and points mentioned in assessment above. Assess benefit of US. [x] Continue per plan of care [] Alter current plan (see comments)  [] Plan of care initiated [] Hold pending MD visit [] Discharge    Electronically signed by: Manpreet Salamanca PT    Note: If patient does not return for scheduled/recommended follow up visits, this note will serve as a discharge from care along with the most recent update on progress.

## 2021-07-20 NOTE — PROGRESS NOTES
Preoperative Screening for Elective Surgery/Invasive Procedures While COVID-19 present in the community     Have you tested positive or have been told to self-isolate for COVID-19 like symptoms within the past 28 days? no   Do you currently have any of the following symptoms?no  o Fever >100.0 F or 99.9 F in immunocompromised patients?no  o New onset cough, shortness of breath or difficulty breathing?no  o New onset sore throat, myalgia (muscle aches and pains), headache, loss of taste/smell or diarrhea? no   Have you had a potential exposure to COVID-19 within the past 14 days by:  o Close contact with a confirmed case?no  o Close contact with a healthcare worker,  or essential infrastructure worker (grocery store, [employer], gas station, public utilities or transportation)?no  o Do you reside in a congregate setting such as; skilled nursing facility, adult home, correctional facility, homeless shelter or other institutional setting?no  o Have you had recent travel to a known COVID-19 hotspot? no    Indicate if the patient has a positive screen by answering yes to one or more of the above questions. Patients who test positive or screen positive prior to surgery or on the day of surgery should be evaluated in conjunction with the surgeon/proceduralist/anesthesiologist to determine the urgency of the procedure. C-Difficile admission screening and protocol:     * Admitted with diarrhea?no     *Prior history of C-Diff. In last 3 months?no     *Antibiotic use in the past 6-8 weeks?no     *Prior hospitalization or nursing home in the last month?   no

## 2021-07-21 ENCOUNTER — ANESTHESIA (OUTPATIENT)
Dept: ENDOSCOPY | Age: 71
End: 2021-07-21
Payer: MEDICARE

## 2021-07-21 ENCOUNTER — HOSPITAL ENCOUNTER (OUTPATIENT)
Age: 71
Setting detail: OUTPATIENT SURGERY
Discharge: HOME | End: 2021-07-21
Attending: INTERNAL MEDICINE | Admitting: INTERNAL MEDICINE
Payer: MEDICARE

## 2021-07-21 VITALS
TEMPERATURE: 97.3 F | OXYGEN SATURATION: 99 % | HEIGHT: 65 IN | HEART RATE: 64 BPM | RESPIRATION RATE: 17 BRPM | DIASTOLIC BLOOD PRESSURE: 68 MMHG | SYSTOLIC BLOOD PRESSURE: 175 MMHG | BODY MASS INDEX: 32.27 KG/M2 | WEIGHT: 193.67 LBS

## 2021-07-21 VITALS
RESPIRATION RATE: 2 BRPM | DIASTOLIC BLOOD PRESSURE: 58 MMHG | SYSTOLIC BLOOD PRESSURE: 126 MMHG | TEMPERATURE: 98.6 F | OXYGEN SATURATION: 100 %

## 2021-07-21 DIAGNOSIS — D3A.8 NEUROENDOCRINE TUMOR: ICD-10-CM

## 2021-07-21 LAB
GLUCOSE BLD-MCNC: 93 MG/DL (ref 70–99)
PERFORMED ON: NORMAL

## 2021-07-21 PROCEDURE — 7100000011 HC PHASE II RECOVERY - ADDTL 15 MIN: Performed by: INTERNAL MEDICINE

## 2021-07-21 PROCEDURE — 88305 TISSUE EXAM BY PATHOLOGIST: CPT

## 2021-07-21 PROCEDURE — 6360000002 HC RX W HCPCS: Performed by: NURSE ANESTHETIST, CERTIFIED REGISTERED

## 2021-07-21 PROCEDURE — 88342 IMHCHEM/IMCYTCHM 1ST ANTB: CPT

## 2021-07-21 PROCEDURE — 7100000000 HC PACU RECOVERY - FIRST 15 MIN: Performed by: INTERNAL MEDICINE

## 2021-07-21 PROCEDURE — 2500000003 HC RX 250 WO HCPCS: Performed by: NURSE ANESTHETIST, CERTIFIED REGISTERED

## 2021-07-21 PROCEDURE — 88360 TUMOR IMMUNOHISTOCHEM/MANUAL: CPT

## 2021-07-21 PROCEDURE — 3609155300 HC EGD W ENDOSCOPIC MUCOSAL RESECTION: Performed by: INTERNAL MEDICINE

## 2021-07-21 PROCEDURE — 2580000003 HC RX 258: Performed by: ANESTHESIOLOGY

## 2021-07-21 PROCEDURE — 7100000001 HC PACU RECOVERY - ADDTL 15 MIN: Performed by: INTERNAL MEDICINE

## 2021-07-21 PROCEDURE — 7100000010 HC PHASE II RECOVERY - FIRST 15 MIN: Performed by: INTERNAL MEDICINE

## 2021-07-21 PROCEDURE — 3700000000 HC ANESTHESIA ATTENDED CARE: Performed by: INTERNAL MEDICINE

## 2021-07-21 PROCEDURE — C1726 CATH, BAL DIL, NON-VASCULAR: HCPCS | Performed by: INTERNAL MEDICINE

## 2021-07-21 PROCEDURE — 3700000001 HC ADD 15 MINUTES (ANESTHESIA): Performed by: INTERNAL MEDICINE

## 2021-07-21 PROCEDURE — 6370000000 HC RX 637 (ALT 250 FOR IP): Performed by: INTERNAL MEDICINE

## 2021-07-21 PROCEDURE — 2709999900 HC NON-CHARGEABLE SUPPLY: Performed by: INTERNAL MEDICINE

## 2021-07-21 PROCEDURE — 88341 IMHCHEM/IMCYTCHM EA ADD ANTB: CPT

## 2021-07-21 PROCEDURE — 2720000010 HC SURG SUPPLY STERILE: Performed by: INTERNAL MEDICINE

## 2021-07-21 PROCEDURE — 3609018500 HC EGD US SCOPE W/ADJACENT STRUCTURES: Performed by: INTERNAL MEDICINE

## 2021-07-21 RX ORDER — SODIUM CHLORIDE 9 MG/ML
25 INJECTION, SOLUTION INTRAVENOUS PRN
Status: DISCONTINUED | OUTPATIENT
Start: 2021-07-21 | End: 2021-07-21 | Stop reason: HOSPADM

## 2021-07-21 RX ORDER — SODIUM CHLORIDE 9 MG/ML
INJECTION, SOLUTION INTRAVENOUS CONTINUOUS
Status: DISCONTINUED | OUTPATIENT
Start: 2021-07-21 | End: 2021-07-21 | Stop reason: HOSPADM

## 2021-07-21 RX ORDER — LIDOCAINE HYDROCHLORIDE 20 MG/ML
INJECTION, SOLUTION EPIDURAL; INFILTRATION; INTRACAUDAL; PERINEURAL PRN
Status: DISCONTINUED | OUTPATIENT
Start: 2021-07-21 | End: 2021-07-21 | Stop reason: SDUPTHER

## 2021-07-21 RX ORDER — SODIUM CHLORIDE 0.9 % (FLUSH) 0.9 %
10 SYRINGE (ML) INJECTION PRN
Status: DISCONTINUED | OUTPATIENT
Start: 2021-07-21 | End: 2021-07-21 | Stop reason: HOSPADM

## 2021-07-21 RX ORDER — PROPOFOL 10 MG/ML
INJECTION, EMULSION INTRAVENOUS CONTINUOUS PRN
Status: DISCONTINUED | OUTPATIENT
Start: 2021-07-21 | End: 2021-07-21 | Stop reason: SDUPTHER

## 2021-07-21 RX ORDER — SODIUM CHLORIDE 0.9 % (FLUSH) 0.9 %
10 SYRINGE (ML) INJECTION EVERY 12 HOURS SCHEDULED
Status: DISCONTINUED | OUTPATIENT
Start: 2021-07-21 | End: 2021-07-21 | Stop reason: HOSPADM

## 2021-07-21 RX ADMIN — PROPOFOL 160 MCG/KG/MIN: 10 INJECTION, EMULSION INTRAVENOUS at 11:37

## 2021-07-21 RX ADMIN — LIDOCAINE HYDROCHLORIDE 50 MG: 20 INJECTION, SOLUTION EPIDURAL; INFILTRATION; INTRACAUDAL; PERINEURAL at 11:35

## 2021-07-21 RX ADMIN — SODIUM CHLORIDE: 9 INJECTION, SOLUTION INTRAVENOUS at 11:19

## 2021-07-21 ASSESSMENT — PULMONARY FUNCTION TESTS
PIF_VALUE: 1
PIF_VALUE: 0
PIF_VALUE: 1
PIF_VALUE: 0
PIF_VALUE: 1

## 2021-07-21 ASSESSMENT — PAIN DESCRIPTION - LOCATION: LOCATION: BACK

## 2021-07-21 ASSESSMENT — PAIN SCALES - GENERAL
PAINLEVEL_OUTOF10: 0
PAINLEVEL_OUTOF10: 2
PAINLEVEL_OUTOF10: 0

## 2021-07-21 ASSESSMENT — PAIN - FUNCTIONAL ASSESSMENT
PAIN_FUNCTIONAL_ASSESSMENT: 0-10
PAIN_FUNCTIONAL_ASSESSMENT: ACTIVITIES ARE NOT PREVENTED

## 2021-07-21 ASSESSMENT — PAIN DESCRIPTION - DESCRIPTORS: DESCRIPTORS: DISCOMFORT

## 2021-07-21 ASSESSMENT — PAIN DESCRIPTION - FREQUENCY: FREQUENCY: CONTINUOUS

## 2021-07-21 ASSESSMENT — LIFESTYLE VARIABLES: SMOKING_STATUS: 0

## 2021-07-21 ASSESSMENT — PAIN DESCRIPTION - PROGRESSION: CLINICAL_PROGRESSION: NOT CHANGED

## 2021-07-21 ASSESSMENT — PAIN DESCRIPTION - ONSET: ONSET: GRADUAL

## 2021-07-21 ASSESSMENT — PAIN DESCRIPTION - ORIENTATION: ORIENTATION: MID

## 2021-07-21 ASSESSMENT — PAIN DESCRIPTION - PAIN TYPE: TYPE: SURGICAL PAIN

## 2021-07-21 NOTE — PROGRESS NOTES
Patient admitted to PACU from [facility]. Patient asleep. Resp easy unlabored on 3LNC with SO2 100%. Monitor in SR. Abdomen soft. VSS. IV patent to left  Forearm.

## 2021-07-21 NOTE — ANESTHESIA PRE PROCEDURE
[facility] Department of Anesthesiology  Pre-Anesthesia Evaluation/Consultation       Name:  Jero Vleiz  : 1950  Age:  69 y.o. MRN:  [id number]  Date: 2021           Surgeon: Surgeon(s):  Veronica Lovett MD    Procedure: Procedure(s):  ESOPHAGOGASTRODUODENOSCOPY  ENDOSCOPIC ULTRASOUND WITH ENDOSCOPIC MUCOSAL RESECTION     Allergies   Allergen Reactions    Lisinopril Swelling    Tetracyclines & Related Itching and Rash     Bumps in mouth and inside of hands     Patient Active Problem List   Diagnosis    Lipoma of back    Mass on back    GERD (gastroesophageal reflux disease)    Hyperlipidemia    Hypertension    Status post total bilateral knee replacement using cement    Left hip pain    Needs flu shot    Vitamin D deficiency    Prediabetes    Tear of left gluteus medius tendon     Past Medical History:   Diagnosis Date    Acid reflux     Headache(784.0)     Hyperlipidemia     Hypertension     Osteoarthritis      Past Surgical History:   Procedure Laterality Date    ANKLE SURGERY      bone spur removed from achilles tendon  in      APPENDECTOMY       SECTION      COLONOSCOPY      HYSTERECTOMY      JOINT REPLACEMENT Left     TKR     KNEE ARTHROSCOPY      both knee    KNEE SURGERY      knee replacement in the left knee    OTHER SURGICAL HISTORY  2017    excision of back mass     Social History     Tobacco Use    Smoking status: Former Smoker    Smokeless tobacco: Never Used    Tobacco comment: quit when 26   Vaping Use    Vaping Use: Never used   Substance Use Topics    Alcohol use: No    Drug use: No     Medications  No current facility-administered medications on file prior to encounter.      Current Outpatient Medications on File Prior to Encounter   Medication Sig Dispense Refill    predniSONE (DELTASONE) 1 MG tablet Take 1 mg by mouth daily Dose pack      cyclobenzaprine (FLEXERIL) 5 MG tablet Take 5 mg by mouth 3 times daily as needed for Muscle spasms      omeprazole (PRILOSEC) 40 MG delayed release capsule Take 1 capsule by mouth every morning (before breakfast) 30 capsule 5    RESTASIS 0.05 % ophthalmic emulsion Place 1 drop into both eyes every 12 hours       acetaminophen (APAP EXTRA STRENGTH) 500 MG tablet Take 1 tablet by mouth every 6 hours as needed for Pain 40 tablet 3    famotidine (PEPCID) 20 MG tablet Take 1 tablet by mouth 2 times daily as needed (heartburn) 60 tablet 3    Omega-3 Fatty Acids (FISH OIL CONCENTRATE PO) Take by mouth      fexofenadine (ALLEGRA) 180 MG tablet Take 180 mg by mouth daily as needed       fluticasone (FLONASE) 50 MCG/ACT nasal spray 1 spray by Each Nostril route daily as needed        Current Facility-Administered Medications   Medication Dose Route Frequency Provider Last Rate Last Admin    0.9 % sodium chloride infusion   Intravenous Continuous Lenore Lee MD        sodium chloride flush 0.9 % injection 10 mL  10 mL Intravenous 2 times per day Lenore Lee MD        sodium chloride flush 0.9 % injection 10 mL  10 mL Intravenous PRN Lenore Lee MD        0.9 % sodium chloride infusion  25 mL Intravenous PRN Lenore Lee MD         Vital Signs (Current)   Vitals:    21 0914 21 1053   BP:  (!) 160/85   Pulse:  65   Resp:  16   Temp:  96.8 °F (36 °C)   TempSrc:  Temporal   SpO2:  99%   Weight: 192 lb (87.1 kg) 193 lb 10.8 oz (87.9 kg)   Height: 5' 5\" (1.651 m) 5' 5\" (1.651 m)                                            Vital Signs Statistics (for past 48 hrs)     Temp  Av.8 °F (36 °C)  Min: 96.8 °F (36 °C)   Min taken time: 21 1053  Max: 96.8 °F (36 °C)   Max taken time: 21  Pulse  Av  Min: 72   Min taken time: 21 1053  Max: 72   Max taken time: 21 1053  Resp  Av  Min: 12   Min taken time: 21  Max: 16   Max taken time: 21 1053  BP  Min: 160/85   Min taken time: 21 of last liquid consumption: 07/20/21   Time of last liquid consumption: 2300   Date of last solid food consumption: 07/20/21      Time of last solid consumption: 2030       Anesthesia Evaluation  Patient summary reviewed no history of anesthetic complications:   Airway: Mallampati: II  TM distance: >3 FB   Neck ROM: full  Mouth opening: > = 3 FB Dental: normal exam         Pulmonary: breath sounds clear to auscultation      (-) COPD, asthma, sleep apnea and not a current smoker                           Cardiovascular:    (+) hypertension:, hyperlipidemia    (-) past MI, CABG/stent and  angina        Rate: normal                    Neuro/Psych:   (+) headaches:,    (-) seizures, TIA and CVA           GI/Hepatic/Renal:   (+) GERD:,           Endo/Other:        (-) diabetes mellitus, hypothyroidism               Abdominal:             Vascular:     - DVT and PE. Other Findings:             Anesthesia Plan      MAC     ASA 3       Induction: intravenous. Anesthetic plan and risks discussed with patient. Plan discussed with CRNA. This pre-anesthesia assessment may be used as a history and physical.    DOS STAFF ADDENDUM:    Pt seen and examined, chart reviewed (including anesthesia, drug and allergy history). No interval changes to history and physical examination. Anesthetic plan, risks, benefits, alternatives, and personnel involved discussed with patient. Patient verbalized an understanding and agrees to proceed.       Rudy Shaikh MD  July 21, 2021  11:07 AM

## 2021-07-21 NOTE — PROGRESS NOTES
[employer]                                                                             Hemostasis Clip  7/21/21 55247      2 clips placed to duodenal neuroendocrine tumor EMR site to prevent bleeding. MRI SAFETY INFORMATION   MR Conditional   Non-clinical testing demonstrated that the hemostasis clip is MR conditional. A patient with this device can be scanned safely in an MR system under the following conditions:   1. Static magnetic field of 1.5 T or 3.0 T   2. Maximum special field gradient of 2,000 gauss/cm (20 T/m)   3. Maximum MR system reported, whole body averaged specific absorption rate (SANDY) of 2 W/kg (normal operating mode). Under the scan conditions defined, the hemostasis clip is expected to produce a maximum temperature rise of 2oC after 15 minutes of continuous scanning. In non-clinical testing, the image artifact caused by the hemostasis clip extends approximately 30 mm from the hemostasis clip when imaged using a gradient echo pulse sequence and a 3.0 T MRI system. Gunjan Duncan is a trademark of [id number]..    Paschal Cogan is a trademark of [facility].

## 2021-07-21 NOTE — H&P
[address] [place] [address]   Pre-operative History and Physical    Patient: Ulises Garces  : 1950  Acct#:     HISTORY OF PRESENT ILLNESS:    The patient is a 69 y.o. female who presents with 69y.o. 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 Past Medical History:        Diagnosis Date    Acid reflux     Headache(784.0)     Hyperlipidemia     Hypertension     Osteoarthritis       Past Surgical History:        Procedure Laterality Date    ANKLE SURGERY      bone spur removed from achilles tendon  in      APPENDECTOMY       SECTION      COLONOSCOPY      HYSTERECTOMY      JOINT REPLACEMENT Left     TKR     KNEE ARTHROSCOPY      both knee    KNEE SURGERY      knee replacement in the left knee    OTHER SURGICAL HISTORY  2017    excision of back mass      Medications Prior to Admission:   No current facility-administered medications on file prior to encounter.      Current Outpatient Medications on File Prior to Encounter   Medication Sig Dispense Refill    predniSONE (DELTASONE) 1 MG tablet Take 1 mg by mouth daily Dose pack      cyclobenzaprine (FLEXERIL) 5 MG tablet Take 5 mg by mouth 3 times daily as needed for Muscle spasms      omeprazole (PRILOSEC) 40 MG delayed release capsule Take 1 capsule by mouth every morning (before breakfast) 30 capsule 5    RESTASIS 0.05 % ophthalmic emulsion Place 1 drop into both eyes every 12 hours       acetaminophen (APAP EXTRA STRENGTH) 500 MG tablet Take 1 tablet by mouth every 6 hours as needed for Pain 40 tablet 3    famotidine (PEPCID) 20 MG tablet Take 1 tablet by mouth 2 times daily as needed (heartburn) 60 tablet 3    Omega-3 Fatty Acids (FISH OIL CONCENTRATE PO) Take by mouth      fexofenadine (ALLEGRA) 180 MG tablet Take 180 mg by mouth daily as needed       fluticasone (FLONASE) 50 MCG/ACT nasal spray 1 spray by Each Nostril route daily as CTA b    Abdomen:  S/NT/ND/+BS      ASSESSMENT AND PLAN:  ASA: per anesthesia  Mallampati: per anesthesia  1. Patient is a 69 y.o. female here for EUS and EGD with endoscopic mucosal resection   2. Procedure options, risks and benefits reviewed with the patient. The patient expresses understanding.     Chaparro Talamantes

## 2021-07-21 NOTE — PROGRESS NOTES
Patient opens eyes to name, very drowsy. VSS. Iv patent. Patient denies C/O pain or nausea. VSS. IV patent.

## 2021-07-21 NOTE — OP NOTE
[address] [place] [address]  Endoscopy Note    Patient: Deyanira Pratt   : 1950  Acct#:     Procedure: Endoscopic ultrasound  EGD with EMR  EGD with clip placement    Date: 2021    Surgeon:  Birdie Lau MD, MD    Referring Physician:  Dr. Freddie Payan    Anesthesia:  MAC per Anesthesia. Indications: This is a 69y.o. 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 Consent: Risks, benefits, and alternatives were explained and informed consent was obtained. Monitoring:  Patient was monitored with continuous pulse oximetry, telemetry, and intermittent blood pressures. Details of the Procedure: The patient was then taken to the endoscopy suite. A time-out was performed. The patient and staff were in agreement as to the correct patient and procedure. The above anesthesia was administered by the anesthesia department. The patient was placed in the left lateral position. The Olympus videoendoscope followed by the radial echoendoscope were placed in the patient's mouth and under direct visualization passed into the esophagus and advanced without difficulty to the 2nd portion of the duodenum. Views were good, patient toleration was good. Retroflexion was performed in the stomach. Findings:  1. The esophagus showed a non-obstructing Schatzki ring. Esophagus otherwise appeared normal without evidence of Tena's esophagus or reflux esophagitis. 2.  Small sliding hiatal hernia. 3.  Normal stomach. 4.  7mm nodule in the duodenal bulb. By EUS, the nodule measured 7.4 x 2.6mm. This was submucosal without invasion into the muscularis. No lymphadenopathy identified. No metastases to the left lobe of the liver. 5.  Normal 2nd portion of the duodenum. Next, the Angulo Jermyn was used and the nodule was band ligated. Next, the nodule was removed with snare cautery polypectomy.   No obvious residual, no bleeding, and no perforation. Next, 3 clips were placed to close the polypectomy site. The duodenum and stomach were decompressed and the scope was withdrawn from the patient. The patient tolerated the procedure well and was taken to the post anesthesia care unit in good condition. Doppler Interpretation:  Doppler evaluation was performed and interpreted on the spot by the endoscopist without the presence of a radiologist.      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 No lymphadenopathy by EUS. Small sliding hiatal hernia  Non-obstructing schatzki ring. Recommendations:  1. Clear liquid diet advance as tolerated. 2.  Follow up on biopsies. We specifically asked pathology to check for margins. 3. Based on pathology will refer to oncology to see if CT surveillance needed. 4.  Repeat EGD in 6 months to check for recurrence.     Chaparro Talamantes

## 2021-07-22 ENCOUNTER — HOSPITAL ENCOUNTER (OUTPATIENT)
Dept: PHYSICAL THERAPY | Age: 71
Setting detail: THERAPIES SERIES
Discharge: HOME | End: 2021-07-22
Payer: MEDICARE

## 2021-07-22 PROCEDURE — 97035 APP MDLTY 1+ULTRASOUND EA 15: CPT

## 2021-07-22 PROCEDURE — 97110 THERAPEUTIC EXERCISES: CPT

## 2021-07-22 PROCEDURE — 97140 MANUAL THERAPY 1/> REGIONS: CPT

## 2021-07-22 NOTE — FLOWSHEET NOTE
[facility] [place]. [address]  Phone: [phone]   Fax:     [phone]    Physical Therapy Treatment Note/ Progress Report:     Date:  2021    Patient Name:  Jerry Estrada    :  1950  MRN: [id number]    Pertinent Medical History:      Medical/Treatment Diagnosis Information:     ·   Diagnosis: M53.3 (ICD-10-CM) - Sacroiliac pain  · Treatment Diagnosis: Back pain, difficulty with ADL's, limited mobility,     Insurance/Certification information:  PT Insurance Information: [employer] Medicare  Physician Information:  Referring Practitioner: Jakob Khalil MD  Plan of care signed (Y/N): Sent to Dr. Tyler Gamboa 21    Date of Patient follow up with Physician:      Progress Report: []  Yes  [x]  No     Date Range for reporting period:  Beginnin2021  Ending:    Progress report due (10 Rx/or 30 days whichever is less):    Recertification due (POC duration/ or 90 days whichever is less):    Visit # POC/ Insurance Allowable Auth Needed   6 8 []Yes    [x]No     Functional Scale:       Date Assessed: at eval  Test: Kimberleestan  Score: 14    Pain level:  310 now     History of Injury: Pt was \"working out\" on Nguyen 15, 2021 and was doing some particular exercises and the next day she felt back pain and buttock pain bilaterally. She has not had any serious back pain prior to that. 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 She has always been active. She used aspercreme and Meloxicam, which have helped. SUBJECTIVE:  21 Patient reports her pain location changed,now is going around in front of the left hip and down the left leg some. States she still having some tenderness in low back too.   21: Pt reports that she is having more pain going down her left and right leg, R>L, which she wonders if it is due to side effect of  Meds.  She will bring this to her doctor's attention. .  7/20/21: Pt reports that she went to Urgent care over the weekend with her back pain and leg pain, It was up to a 10/10 on Sunday, she reports. She is currently on a Medrol  Dose pack. , which is helpful.  7/22/21: Pt reports that she had an endoscopic procedure yesterday and her throat is a little sore. She is doing netter with her back. She has one more day on the Medrol dose pack.  OBJECTIVE: See eval   Observation:    Test measurements:      RESTRICTIONS/PRECAUTIONS:     Exercises/Interventions:     Therapeutic Ex (77650)   Min: Resistance/Reps Notes/Cues   Bridging 5 sec x 3X 10    Clamshell 20X 7/20/21: Issued red band for resistance   LTR 2 min    Prone glut sets 5 sec x 10    PPT with ball 5 sec x 10    Nustep 5 min    PPT with fall outs 10 x each side Added 7/20/21, +HEP             Manual Intervention (51854) Min:      Left ishial tub higher than  Right, Left leg longer     Hip distraction gr 2,MFR to bilateral lumbar paraspinals,P/A mobs gr 2     NMR re-education (67507)   Min:     Mf Activation- re-ed     TrA Re-ed activation     Glute Max re-ed activation          Therapeutic Activity (51981) Min:      Added 7/08/21 and continued on 7/13/21         Modalities  Min:       Ultrasound x 8 min to lumbar area at 1.3 w/cm2      Other Therapeutic Activities:  Pt was educated on PT POC, Diagnosis, Prognosis, pathomechanics as well as frequency and duration of scheduling future physical therapy appointments. Time was also taken on this day to answer all patient questions and participation in PT. Reviewed appointment policy in detail with patient and patient verbalized understanding. Home Exercise Program:   Access Code: [email or web address]  URL: [email or web address]  Date: 06/30/2021  Prepared by:  Julio Aparicio     Exercises  Supine Bridge - 1 x daily - 7 x weekly - 3 sets - 10 reps  Clamshell - 1 x daily - 7 x weekly - 3 sets - 10 reps  Supine Lower Trunk Rotation - 1 x daily - 7 x weekly - 3 sets - 10 reps  Prone Gluteal Sets - 1 x daily - 7 x weekly - 3 sets - 10 reps     Access Code: [email or web address]  URL: [email or web address]  Date: 07/20/2021  Prepared by: Delia Llamas    Exercises  Supine Single Bent Knee Fallout - 1 x daily - 7 x weekly - 3 sets - 10 reps    Therapeutic Exercise and NMR EXR  [x] (64998) Provided verbal/tactile cueing for activities related to strengthening, flexibility, endurance, ROM  for improvements in proximal hip and core control with self care, mobility, lifting and ambulation.  [] (02044) Provided verbal/tactile cueing for activities related to improving balance, coordination, kinesthetic sense, posture, motor skill, proprioception  to assist with core control in self care, mobility, lifting, and ambulation.      Therapeutic Activities:    [x] (99523 or 69650) Provided verbal/tactile cueing for activities related to improving balance, coordination, kinesthetic sense, posture, motor skill, proprioception and motor activation to allow for proper function  with self care and ADLs  [] (73121) Provided training and instruction to the patient for proper core and proximal hip recruitment and positioning with ambulation re-education     Home Exercise Program:    [x] (43033) Reviewed/Progressed HEP activities related to strengthening, flexibility, endurance, ROM of core, proximal hip and LE for functional self-care, mobility, lifting and ambulation   [] (21777) Reviewed/Progressed HEP activities related to improving balance, coordination, kinesthetic sense, posture, motor skill, proprioception of core, proximal hip and LE for self care, mobility, lifting, and ambulation      Manual Treatments:  PROM / STM / Oscillations-Mobs:  G-I, II, III, IV (PA's, Inf., Post.)  [] (24478) Provided manual therapy to mobilize proximal hip and LS spine soft tissue/joints for the purpose of modulating pain, promoting relaxation,  increasing ROM, reducing/eliminating soft tissue swelling/inflammation/restriction, improving soft tissue extensibility and allowing for proper ROM for normal function with self care, mobility, lifting and ambulation. Approval Dates:  CPT Code Units Approved Units Used  Date Updated:                     Charges:  Timed Code Treatment Minutes: 45   Total Treatment Minutes: 45     [] EVAL (LOW) 80053 (typically 20 minutes face-to-face)  [] EVAL (MOD) 49574 (typically 30 minutes face-to-face)  [] EVAL (HIGH) 64662 (typically 45 minutes face-to-face)  [] RE-EVAL     [x] FN(39086) x  1   [] Dry needle 1 or 2 Muscles (27836)  [] NMR (08710) x 1   [] Dry needle 3+ Muscles (92558)  [x] Manual (42461) x  1   [x] Ultrasound (10115) x 8 min  [] TA (87775) x 1    [] Mech Traction (09232)  [] ES(attended) (21200)     [] ES (un) (74425):   [] Vasopump (99393) [] Ionto (30118)   [] Other:    GOALS:\"To learn back injury prevention\"  []? Progressing: []? Met: []? Not Met: []? Adjusted  Therapist goals for Patient:   Short Term Goals: To be achieved in: 2 weeks  1. Independent in HEP and progression per patient tolerance, in order to prevent re-injury. []? Progressing: []? Met: []? Not Met: []? Adjusted  2. Patient will have a decrease in pain to facilitate improvement in movement, function, and ADLs as indicated by Functional Deficits. []? Progressing: []? Met: []? Not Met: []? Adjusted     Long Term Goals: To be achieved in: 4 weeks  1. Disability index score of 1% or less for the ADRI to assist with reaching prior level of function. []? Progressing: []? Met: []? Not Met: []? Adjusted  2. Patient will demonstrate increased AROM to WNL, good LS mobility, good hip ROM to allow for proper joint functioning as indicated by patients Functional Deficits. []? Progressing: []? Met: []? Not Met: []? Adjusted  3.  Patient will demonstrate an increase in Strength to good proximal hip and core activation to allow for proper functional mobility as indicated by patients Functional

## 2021-07-28 ENCOUNTER — HOSPITAL ENCOUNTER (OUTPATIENT)
Dept: PHYSICAL THERAPY | Age: 71
Setting detail: THERAPIES SERIES
Discharge: HOME | End: 2021-07-28
Payer: MEDICARE

## 2021-07-28 PROCEDURE — 97140 MANUAL THERAPY 1/> REGIONS: CPT

## 2021-07-28 PROCEDURE — 97035 APP MDLTY 1+ULTRASOUND EA 15: CPT

## 2021-07-28 PROCEDURE — 97110 THERAPEUTIC EXERCISES: CPT

## 2021-07-28 PROCEDURE — 97530 THERAPEUTIC ACTIVITIES: CPT

## 2021-07-28 ASSESSMENT — PAIN SCALES - QUEBEC BACK PAIN DISABILITY SCALE
TURN OVER IN BED: 0
RIDE IN A CAR: 0
RUN ONE BLOCK OR 100M: 1
REACH UP TO HIGH SHELVES: 0
TAKE FOOD OUT OF THE REFRIGERATOR: 0
GET OUT OF BED: 0
TOTAL SCORE: 12
MOVE A CHAIR: 1
QUEBEC DISABILITY INDEX: 1-19%
SLEEP THROUGH THE NIGHT: 0
WALK SEVERAL KILOMETERS  OR MILES: 0
PULL OR PUSH HEAVY DOORS: 1
QUEBEC CMS MODIFIER: CI
THROW A BALL: 0
CARRY TWO BAGS OF GROCERIES: 1
PUT ON SOCKS OR PANYHOSE: 1
STAND UP FOR 20 TO 30 MINUTES: 0
SIT IN A CHAIR FOR SEVERAL HOURS: 0
BEND OVER TO CLEAN THE BATHTUB: 4
LIFT AND CARRY A HEAVY SUITCASE: 2
CLIMB ONE FLIGHT OF STAIRS: 0
MAKE YOUR BED: 1
WALK A FEW BLOCKS OR 300 TO 400M: 0

## 2021-07-28 NOTE — PROGRESS NOTES
[facility]. Feliciano Sotelo [address]  Phone: [phone]   Fax:     [phone]       Physical Therapy Discharge Summary    Dear  Dr. Altagracia Ward,  ,    We had the pleasure of treating the following patient for physical therapy services at [address] [place] [address]. A summary of our findings can be found in the discharge summary below. If you have any questions or concerns regarding these findings, please do not hesitate to contact me at the office phone number above.   Thank you for the referral.     Physician Signature:________________________________Date:__________________  By signing above (or electronic signature), therapists plan is approved by physician      Overall Response to Treatment:   [x]Patient is responding well to treatment and improvement is noted with regards  to goals   []Patient should continue to improve in reasonable time if they continue HEP   []Patient has plateaued and is no longer responding to skilled PT intervention    []Patient is getting worse and would benefit from return to referring MD   []Patient unable to adhere to initial POC   []Other:     Date range of Visits: 21-21  Total Visits: 7      Date:  2021    Patient Name:  Thelma Ordaz    :  1950  MRN: [id number]    Pertinent Medical History:      Medical/Treatment Diagnosis Information:     ·   Diagnosis: M53.3 (ICD-10-CM) - Sacroiliac pain  · Treatment Diagnosis: Back pain, difficulty with ADL's, limited mobility,     Insurance/Certification information:  PT Insurance Information: [employer] Medicare  Physician Information:  Referring Practitioner: Irasema Wagner MD  Plan of care signed (Y/N): Sent to Dr. Altagracia Ward 21    Date of Patient follow up with Physician:      Progress Report: [x]  Yes  [x]  No     Date Range for reporting period:  Beginnin2021  Ending: 7/8/21    Progress report due (10 Rx/or 30 days whichever is less):    Recertification due (POC duration/ or 90 days whichever is less):    Visit # POC/ Insurance Allowable Auth Needed   7 8 []Yes    [x]No     Functional Scale:       Date Assessed: at DC  Test: Kimberleelisa  Score: 12    Pain level:  3/10 now     History of Injury: Pt was \"working out\" on Nguyen 15, 2021 and was doing some particular exercises and the next day she felt back pain and buttock pain bilaterally. She has not had any serious back pain prior to that. Both knees have been replaced, left in 2008 and right in 2017. She has always been active. She used aspercreme and Meloxicam, which have helped. SUBJECTIVE:  07/08/21 Patient reports her pain location changed,now is going around in front of the left hip and down the left leg some. States she still having some tenderness in low back too.   7/13/21: Pt reports that she is having more pain going down her left and right leg, R>L, which she wonders if it is due to side effect of  Meds. She will bring this to her doctor's attention. .  7/20/21: Pt reports that she went to Urgent care over the weekend with her back pain and leg pain, It was up to a 10/10 on Sunday, she reports. She is currently on a Medrol  Dose pack. , which is helpful.  7/22/21: Pt reports that she had an endoscopic procedure yesterday and her throat is a little sore. She is doing better with her back. She has one more day on the Medrol dose pack. 7/28/21: Pt reports that she was diagnosed with throat cancer this week, based on her biopsy from last week. She feels well, however. Back is better but she is not painfree.      OBJECTIVE:    Observation:  Fast walking pattern   Test measurements:  ROM: Trunk flexion: 110 (to toes)   Extension: 55  SB L:   45  SB R:  43  Rot L and R: WNL    Strength: Good core and LE strength (4/5)     RESTRICTIONS/PRECAUTIONS:     Exercises/Interventions:     Therapeutic Ex (90539)   Min: Resistance/Reps Notes/Cues   Bridging 5 sec x 3X 10    Clamshell 20X 7/20/21: Issued red band for resistance   LTR 2 min    Prone glut sets 5 sec x 10    PPT with ball 5 sec x 10    Nustep 5 min    PPT with fall outs 10 x each side Added 7/20/21, +HEP             Manual Intervention (92636) Min:      Left ishial tub higher than  Right, Left leg longer     Hip distraction gr 2,MFR to bilateral lumbar paraspinals,P/A mobs gr 2     NMR re-education (19787)   Min:     Mf Activation- re-ed     TrA Re-ed activation     Glute Max re-ed activation          Therapeutic Activity (13249) Min:      Added 7/08/21 and continued on 7/13/21 7/28/21: reviewed Earle and goals, took measurements. Modalities  Min:       Ultrasound x 8 min to lumbar area at 1.3 w/cm2      Other Therapeutic Activities:  Pt was educated on PT POC, Diagnosis, Prognosis, pathomechanics as well as frequency and duration of scheduling future physical therapy appointments. Time was also taken on this day to answer all patient questions and participation in PT. Reviewed appointment policy in detail with patient and patient verbalized understanding. Home Exercise Program:   Access Code: [email or web address]  URL: [email or web address]  Date: 06/30/2021  Prepared by: Gwenetta Height     Exercises  Supine Bridge - 1 x daily - 7 x weekly - 3 sets - 10 reps  Clamshell - 1 x daily - 7 x weekly - 3 sets - 10 reps  Supine Lower Trunk Rotation - 1 x daily - 7 x weekly - 3 sets - 10 reps  Prone Gluteal Sets - 1 x daily - 7 x weekly - 3 sets - 10 reps     Access Code: [email or web address]  URL: [email or web address]  Date: 07/20/2021  Prepared by: Gwenetta Height    Exercises  Supine Single Bent Knee Fallout - 1 x daily - 7 x weekly - 3 sets - 10 reps    7/28/21: reviewed HEP and pt performs all well, has written instructions.       Therapeutic Exercise and NMR EXR  [x] (35173) Provided verbal/tactile cueing for activities related to strengthening, flexibility, endurance, ROM for improvements in proximal hip and core control with self care, mobility, lifting and ambulation.  [] (28909) Provided verbal/tactile cueing for activities related to improving balance, coordination, kinesthetic sense, posture, motor skill, proprioception  to assist with core control in self care, mobility, lifting, and ambulation. Therapeutic Activities:    [x] (42511 or 88368) Provided verbal/tactile cueing for activities related to improving balance, coordination, kinesthetic sense, posture, motor skill, proprioception and motor activation to allow for proper function  with self care and ADLs  [] (78458) Provided training and instruction to the patient for proper core and proximal hip recruitment and positioning with ambulation re-education     Home Exercise Program:    [x] (64035) Reviewed/Progressed HEP activities related to strengthening, flexibility, endurance, ROM of core, proximal hip and LE for functional self-care, mobility, lifting and ambulation   [] (66619) Reviewed/Progressed HEP activities related to improving balance, coordination, kinesthetic sense, posture, motor skill, proprioception of core, proximal hip and LE for self care, mobility, lifting, and ambulation      Manual Treatments:  PROM / STM / Oscillations-Mobs:  G-I, II, III, IV (PA's, Inf., Post.)  [] (02506) Provided manual therapy to mobilize proximal hip and LS spine soft tissue/joints for the purpose of modulating pain, promoting relaxation,  increasing ROM, reducing/eliminating soft tissue swelling/inflammation/restriction, improving soft tissue extensibility and allowing for proper ROM for normal function with self care, mobility, lifting and ambulation.      Approval Dates:  CPT Code Units Approved Units Used  Date Updated:                     Charges:  Timed Code Treatment Minutes: 60   Total Treatment Minutes: 60     [] EVAL (LOW) 68597 (typically 20 minutes face-to-face)  [] EVAL (MOD) 53831 (typically 30 minutes face-to-face)  [] EVAL (HIGH) 87968 (typically 45 minutes face-to-face)  [] RE-EVAL     [x] FG(66754) x  1   [] Dry needle 1 or 2 Muscles (18289)  [] NMR (52811) x 1   [] Dry needle 3+ Muscles (30677)  [x] Manual (13573) x  1   [x] Ultrasound (08887) x 8 min  [x] TA (16328) x 1    [] Mech Traction (43096)  [] ES(attended) (66823)     [] ES (un) (65948):   [] Vasopump (51738) [] Ionto (92451)   [] Other:    GOALS:\"To learn back injury prevention\"  []? Progressing: [x]? Met: []? Not Met: []? Adjusted  Therapist goals for Patient:   Short Term Goals: To be achieved in: 2 weeks  1. Independent in HEP and progression per patient tolerance, in order to prevent re-injury. []? Progressing: [x]? Met: []? Not Met: []? Adjusted  2. Patient will have a decrease in pain to facilitate improvement in movement, function, and ADLs as indicated by Functional Deficits. []? Progressing: [x]? Met: []? Not Met: []? Adjusted     Long Term Goals: To be achieved in: 4 weeks  1. Disability index score of 1% or less for the ADRI to assist with reaching prior level of function. [x]? Progressing: []? Met: []? Not Met: []? Adjusted  2. Patient will demonstrate increased AROM to WNL, good LS mobility, good hip ROM to allow for proper joint functioning as indicated by patients Functional Deficits. []? Progressing: [x]? Met: []? Not Met: []? Adjusted  3. Patient will demonstrate an increase in Strength to good proximal hip and core activation to allow for proper functional mobility as indicated by patients Functional Deficits. []? Progressing: [x]? Met: []? Not Met: []? Adjusted  4. Patient will return to 99%  functional activities without increased symptoms or restriction. [x]? Progressing: []? Met: []? Not Met: []? Adjusted  5. Pt will be able to safely resume work outs to stay active and healthy(patient specific functional goal)    [x]? Progressing: []? Met: []? Not Met: []?  Adjusted          ASSESSMENT:  Patient has minimal pain now

## 2021-08-11 ENCOUNTER — HOSPITAL ENCOUNTER (OUTPATIENT)
Dept: CT IMAGING | Age: 71
Discharge: HOME | End: 2021-08-11
Payer: MEDICARE

## 2021-08-11 DIAGNOSIS — C7A.010 MALIGNANT CARCINOID TUMOR OF THE DUODENUM (HCC): ICD-10-CM

## 2021-08-11 LAB
GFR AFRICAN AMERICAN: >60
GFR NON-AFRICAN AMERICAN: >60
PERFORMED ON: NORMAL
POC CREATININE: 0.9 MG/DL (ref 0.6–1.2)
POC SAMPLE TYPE: NORMAL

## 2021-08-11 PROCEDURE — 6360000004 HC RX CONTRAST MEDICATION: Performed by: INTERNAL MEDICINE

## 2021-08-11 PROCEDURE — 71260 CT THORAX DX C+: CPT

## 2021-08-11 PROCEDURE — 82565 ASSAY OF CREATININE: CPT

## 2021-08-11 RX ADMIN — IOPAMIDOL 75 ML: 755 INJECTION, SOLUTION INTRAVENOUS at 11:21

## 2021-08-11 RX ADMIN — IOHEXOL 50 ML: 240 INJECTION, SOLUTION INTRATHECAL; INTRAVASCULAR; INTRAVENOUS; ORAL at 11:21

## 2021-08-13 ENCOUNTER — OFFICE VISIT (OUTPATIENT)
Dept: PRIMARY CARE CLINIC | Age: 71
End: 2021-08-13
Payer: MEDICARE

## 2021-08-13 VITALS
WEIGHT: 197.6 LBS | BODY MASS INDEX: 32.92 KG/M2 | HEIGHT: 65 IN | DIASTOLIC BLOOD PRESSURE: 84 MMHG | OXYGEN SATURATION: 99 % | HEART RATE: 80 BPM | SYSTOLIC BLOOD PRESSURE: 144 MMHG | TEMPERATURE: 97.2 F

## 2021-08-13 DIAGNOSIS — C7A.8 NEURO-ENDOCRINE CANCER (HCC): ICD-10-CM

## 2021-08-13 DIAGNOSIS — I10 ESSENTIAL HYPERTENSION: ICD-10-CM

## 2021-08-13 PROCEDURE — 99213 OFFICE O/P EST LOW 20 MIN: CPT | Performed by: INTERNAL MEDICINE

## 2021-08-13 ASSESSMENT — ENCOUNTER SYMPTOMS
ABDOMINAL DISTENTION: 0
EYES NEGATIVE: 1
CONSTIPATION: 0
SHORTNESS OF BREATH: 0
ALLERGIC/IMMUNOLOGIC NEGATIVE: 1
CHEST TIGHTNESS: 0
WHEEZING: 0

## 2021-08-13 NOTE — PROGRESS NOTES
Subjective:      Patient ID: Jero Veliz is a 69 y.o. female. 8/13/2021 Patient presents with:  Hypertension . NOT taking higher dose of Labetalol as prescribed at last visit . States readings are fine at home! 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 C/o  Dr Eber Arenas             7/7/2021 Patient presents with:  Medicare AWV  Hypertension  Stress: because of                 Last seen   1/7/2021 Patient presents with:  Hypertension  Generally well   No falls   Stressed with 's personality issues! Managing with reg daily exercise              6/30/2020   Medicare AWV          12/19/2019        10/15/19   Rupture injury / ankle while on Treadmill . C/O Podiatrist         3/26/19     1/29/19      9/18/18       8/15/18         7/26/18 Hypertension: stressed b/e of  'not giving me space '  Headache                    Hypertension  Pertinent negatives include no shortness of breath. Review of Systems   Constitutional: Negative for activity change, appetite change and fatigue. Flu  Vac 10/20    tdap  10/16    Zostavac 2013 / [place] . Shingrex 10/19    Pneumonia vac   5/19 ; 10/15    Covid Vac 4/21   HENT:        Dental ex   Reg    Eyes: Negative. Negative for visual disturbance. Eye  Ex   11/20  . Wears Glasses    Respiratory: Negative for chest tightness, shortness of breath and wheezing. Does not smoke . No Etoh . No asthma   Cardiovascular:        HTN > 10 yrs  , No known CAD     Mom had MI in 76s   Gastrointestinal: Negative for abdominal distention and constipation. 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 C/o  Dr Eber Arenas           Repeat Upper Endoscopy 6/21  Schatzki ring   Repeat lower Colonoscopy NL  Dr Destini Lozano / [facility] 3/2014  . Clean . Good for 10 yrs . Dr Layla Mcneal         Endocrine: Negative. No FH of diabetes   Genitourinary: Negative for dysuria. S/P Hysterectomy . Mammogram 4/21    Daughter 39, with Breast Cancer   Musculoskeletal: Negative for joint swelling. DEXA 11/13 . No osteopenia    S/P Rt knee replac 4/10/17 . Left knee 2009    Allergic/Immunologic: Negative. Negative for food allergies. Psychiatric/Behavioral: Negative for sleep disturbance. C/O Psychiatry / Ms Celena Velazquez . Feeling better        Objective:   Physical Exam  Vitals reviewed. Constitutional:       Appearance: She is obese. Cardiovascular:      Rate and Rhythm: Regular rhythm. Heart sounds: Normal heart sounds. Pulmonary:      Breath sounds: Normal breath sounds. Abdominal:      General: There is distension. Palpations: Abdomen is soft. Tenderness: There is no abdominal tenderness. Musculoskeletal:         General: Normal range of motion. Cervical back: No rigidity. Skin:     General: Skin is warm. Neurological:      Mental Status: She is oriented to person, place, and time. Psychiatric:         Mood and Affect: Affect is angry. Cognition and Memory: Cognition normal.         Assessment:     Conner Ha was seen today for hypertension. Diagnoses and all orders for this visit:        Neuro-endocrine cancer ([facility] [address]  C/O Dr Berto Schneider . S/P resection 7/21/21         Essential hypertension  BP up since last few visits . advised to take higher dose  Labetalol to 200 mg bid   -     labetalol (NORMODYNE) 200 MG tablet; Take 1 tablet by mouth 2 times daily  -     hydroCHLOROthiazide (HYDRODIURIL) 25 MG tablet; Take 1 tablet by mouth daily    Hearing reduced, bilateral  -     External Referral To Audiology    Stress due to marital problems  Not able to go on holiday    Hyperlipidemia, unspecified hyperlipidemia type  -     simvastatin (ZOCOR) 20 MG tablet; Take 1 tablet by mouth nightly    Gastroesophageal reflux disease without esophagitis on PPI .  Pepcid for lily symptoms  -                             Plan:

## 2021-08-16 ENCOUNTER — OFFICE VISIT (OUTPATIENT)
Dept: ORTHOPEDIC SURGERY | Age: 71
End: 2021-08-16
Payer: MEDICARE

## 2021-08-16 VITALS — RESPIRATION RATE: 16 BRPM | HEIGHT: 65 IN | WEIGHT: 197 LBS | BODY MASS INDEX: 32.82 KG/M2

## 2021-08-16 DIAGNOSIS — M79.603 PAIN OF UPPER EXTREMITY, UNSPECIFIED LATERALITY: Primary | ICD-10-CM

## 2021-08-16 DIAGNOSIS — M50.30 DDD (DEGENERATIVE DISC DISEASE), CERVICAL: ICD-10-CM

## 2021-08-16 PROCEDURE — 99214 OFFICE O/P EST MOD 30 MIN: CPT | Performed by: PHYSICIAN ASSISTANT

## 2021-08-16 RX ORDER — METHYLPREDNISOLONE 4 MG/1
TABLET ORAL
Qty: 1 KIT | Refills: 0 | Status: SHIPPED | OUTPATIENT
Start: 2021-08-16 | End: 2021-09-21

## 2021-08-17 NOTE — PROGRESS NOTES
Subjective:      Patient ID: Citlaly Choudhury is a 69 y.o. female who presents to the office for an initial evaluation of right shoulder/ arm pain. HPI:   She states this pain started several weeks ago. She states the symptoms started about 1 week after completing physical therapy for her low back. No history of injury. Pain is primarily in the deltoid region of the right shoulder. Pain radiates down to her elbow and into her forearm. She also has some mild lateral neck pain. Pain Scale 12/10 VAS. Pain is worse with activity. Pain improves with rest.   Previous treatments have included Tylenol. Review Of Systems:   Review of Systems:  I have reviewed the clinically relevant past medical history, medications, allergies, family history, social history, and 13 point Review of Systems from the patient's recent history form & documented any details relevant to today's presenting complaints in the history above. The patient's self-reported past medical history, medications, allergies, family history, social history, and Review of Systems form from 10/08/2020 have been scanned into the chart under the \"Media\" tab. As noted in the HPI. Negative for fever or chills. Negative for poly-joint pain, swelling and stiffness. Negative for numbness or tingling.      Past Medical History:   Diagnosis Date    Acid reflux     Headache(784.0)     Hyperlipidemia     Hypertension     Osteoarthritis        Family History   Problem Relation Age of Onset    High Blood Pressure Mother     Arthritis Father        Past Surgical History:   Procedure Laterality Date    ANKLE SURGERY      bone spur removed from achilles tendon  in      APPENDECTOMY       SECTION      COLONOSCOPY      HYSTERECTOMY      JOINT REPLACEMENT Left     TKR     KNEE ARTHROSCOPY      both knee    KNEE SURGERY      knee replacement in the left knee    OTHER SURGICAL HISTORY  2017    excision of back mass  UPPER GASTROINTESTINAL ENDOSCOPY N/A 7/21/2021    EGD W/ EMR performed by Fahad Denny MD at [facility] N/A 7/21/2021    ENDOSCOPIC ULTRASOUND WITH ENDOSCOPIC MUCOSAL RESECTION with 3 clips placed performed by Fahad Denny MD at [address] Not on file   Tobacco Use    Smoking status: Never Smoker    Smokeless tobacco: Never Used    Tobacco comment: quit when 29   Vaping Use    Vaping Use: Never used   Substance and Sexual Activity    Alcohol use: No    Drug use: No    Sexual activity: Not on file       Current Outpatient Medications   Medication Sig Dispense Refill    methylPREDNISolone (MEDROL, ANNIA,) 4 MG tablet Take by mouth.  6 po day one 5 po day 2 4 po day 3 3 po day 4 2 po day 5 1 po day 6. 1 kit 0    simvastatin (ZOCOR) 20 MG tablet Take 20 mg by mouth nightly      labetalol (NORMODYNE) 200 MG tablet Take 1 tablet by mouth 2 times daily 60 tablet 5    hydroCHLOROthiazide (HYDRODIURIL) 25 MG tablet Take 1 tablet by mouth daily 90 tablet 5    famotidine (PEPCID) 20 MG tablet Take 1 tablet by mouth 2 times daily as needed (heartburn) 60 tablet 3    RESTASIS 0.05 % ophthalmic emulsion Place 1 drop into both eyes every 12 hours       acetaminophen (APAP EXTRA STRENGTH) 500 MG tablet Take 1 tablet by mouth every 6 hours as needed for Pain 40 tablet 3    predniSONE (DELTASONE) 1 MG tablet Take 1 mg by mouth daily Dose pack (Patient not taking: Reported on 8/13/2021)      cyclobenzaprine (FLEXERIL) 5 MG tablet Take 5 mg by mouth 3 times daily as needed for Muscle spasms (Patient not taking: Reported on 8/16/2021)      omeprazole (PRILOSEC) 40 MG delayed release capsule Take 1 capsule by mouth every morning (before breakfast) (Patient not taking: Reported on 8/16/2021) 30 capsule 5    Omega-3 Fatty Acids (FISH OIL CONCENTRATE PO) Take by mouth (Patient not taking: Reported on 8/13/2021)      fexofenadine (ALLEGRA) 180 MG tablet Take 180 mg by mouth daily as needed  (Patient not taking: Reported on 8/16/2021)      fluticasone (FLONASE) 50 MCG/ACT nasal spray 1 spray by Each Nostril route daily as needed  (Patient not taking: Reported on 8/16/2021)       No current facility-administered medications for this visit. Objective:     She is alert, oriented x 3, pleasant, well nourished, developed and in no   acute distress. Resp 16   Ht 5' 5\" (1.651 m)   Wt 197 lb (89.4 kg)   BMI 32.78 kg/m²      Cervical Spine Exam:  There is not deformity. There is  loss of motion. There is  muscular spasm. There is  trapezius/ rhomboid tenderness. There is not spinous process tenderness. Spurling Test is Positive. Upper extremities:  She has 5/5 strength of her interosseous muscles, wrist dorsiflexors and volarflexors, biceps, triceps, deltoids, and internal and external rotators of her shoulders, bilaterally. Her biceps, triceps, bracheoradialis, quadriceps and achilles reflexes are 2+, bilaterally. Sensation is intact to light touchfrom C6 to C8. She has no clonus and negative Tena's bilaterally. Examination of the upper extremities shows intact perfusion to all extremities. She has no cyanosis and digigts are warm to touch, capillary refill is less than 2 seconds. She has no edema noted. She has intact skin without lacerations or abrasions, no significant erythema, rashes or skin lesions. X Rays: were performed in the office today:   AP and Lateral Cervical Spine Radiographs: The alignment of the vertebral bodies is normal.   Loss of the normal curvature of the spine is noted. Degenerative disc disease is noted C4-C7. Facet arthritis is noted. Additional Tests reviewed: None. Additional Outside Records reviewed: None. Diagnosis:       ICD-10-CM    1. Pain of upper extremity, unspecified laterality  M79.603 XR CERVICAL SPINE (2-3 VIEWS)   2.  DDD (degenerative disc disease), cervical  M50.30 Ambulatory referral to Physical Therapy        Assessment and Plan:       Assessment:  Cervical radicular pain. X-rays demonstrated degenerative disc disease of the cervical spine C4-C7. I had an extensive discussion with Ms. Twila Martinez regarding the natural history, etiology, and long term consequences of her condition. I have presented reasonable alternatives to the patient's proposed care, treatment, and services. Risks and benefits of the treatment options also reviewed in detail. I have outlined a treatment plan with them. She has had full opportunity to ask her questions. I have answered them all to her satisfaction. I feel that Ms. Twila Martinez understands our discussion today. Plan:  Medications-Medrol Dosepak    PT-Rx for PT provided today. Further Imaging-discussed possible MRI of cervical spine if symptoms fail to improve with medications and therapy. Follow up- 3-4 weeks. Call or return to clinic if these symptoms worsen or fail to improve as anticipated. Reece Yeager PA-C   Senior Physician Assistant   [facility]                                         Disclaimer: This note was generated with use of a verbal recognition program (DRAGON) and an attempt was made to check for errors. It is possible that there are still dictated errors within this office note. If so, please bring any significant errors to my attention for an addendum. All efforts were made to ensure that this office note is accurate.

## 2021-08-18 DIAGNOSIS — I10 ESSENTIAL HYPERTENSION: ICD-10-CM

## 2021-08-18 RX ORDER — MELOXICAM 15 MG/1
TABLET ORAL
Qty: 15 TABLET | Refills: 1 | Status: SHIPPED | OUTPATIENT
Start: 2021-08-18 | End: 2021-12-02

## 2021-08-18 RX ORDER — LABETALOL 100 MG/1
TABLET, FILM COATED ORAL
Qty: 180 TABLET | Refills: 5 | Status: SHIPPED | OUTPATIENT
Start: 2021-08-18 | End: 2021-11-03

## 2021-08-18 NOTE — TELEPHONE ENCOUNTER
Medication:   Requested Prescriptions     Pending Prescriptions Disp Refills    labetalol (NORMODYNE) 100 MG tablet [Pharmacy Med Name: LABETALOL  MG TABLET] 180 tablet 5     Sig: TAKE ONE TABLET BY MOUTH TWICE A DAY        Last Filled:      Patient Phone Number: [phone] (home)     Last appt: 8/13/2021   Next appt: 8/18/2021    Last OARRS:   RX Monitoring 4/16/2019   Attestation The Prescription Monitoring Report for this patient was reviewed today.    Periodic Controlled Substance Monitoring -

## 2021-08-25 ENCOUNTER — HOSPITAL ENCOUNTER (OUTPATIENT)
Dept: PHYSICAL THERAPY | Age: 71
Setting detail: THERAPIES SERIES
Discharge: HOME | End: 2021-08-25
Payer: MEDICARE

## 2021-08-25 PROCEDURE — 97162 PT EVAL MOD COMPLEX 30 MIN: CPT

## 2021-08-25 PROCEDURE — 97012 MECHANICAL TRACTION THERAPY: CPT

## 2021-08-25 PROCEDURE — 97140 MANUAL THERAPY 1/> REGIONS: CPT

## 2021-08-25 NOTE — FLOWSHEET NOTE
[facility] [address]  Phone: [phone]   Fax:     [phone]    Physical Therapy Treatment Note/ Progress Report:     Date:  2021    Patient Name:  Renu Torres    :  1950  MRN: [id number]    Pertinent Medical History:  HTN.  OA, h/o cancer, Hearing aids- bilaterally    Medical/Treatment Diagnosis Information:  · Diagnosis: M50.30 (ICD-10-CM) - DDD (degenerative disc disease), cervical  · Treatment Diagnosis: Neck and right arm pain, limited mobility, UE weakness, difficulty with ADL's    Insurance/Certification information:   [employer] Medicare  Physician Information:  Referring Practitioner: CHALRY Schafer  Plan of care signed (Y/N): Sent to Dao Friedman on 21    Date of Patient follow up with Physician: 21     Progress Report: []  Yes  [x]  No     Date Range for reporting period:  Beginnin2021  Ending:     Progress report due (10 Rx/or 30 days whichever is less): 3.66.01    Recertification due (POC duration/ or 90 days whichever is less):     Visit # Insurance/POC Allowable Auth Needed   1 8 []Yes    [x]No     Functional Outcomes Measure:    Date Assessed: at eval  Test:NDI  Score:5    Pain level:  3-4/10     SUBJECTIVE:  See eval    OBJECTIVE: See eval   Observation:    Test measurements:      RESTRICTIONS/PRECAUTIONS:   Exercises/Interventions:   Therapeutic Ex (25342)  Min: Resistance/Repetitions Notes   Dorsal glide Attempted, but made\" arm feel funny\"    Active cervical spinal rotation 10X each direction                                  Manual Intervention (72641)  Min:     Cerv mobs/manip Sub occipital release     Thoracic mobs/manip     CT manip     Rib mobilizations     STM Stretching of upper traps         NMR re-education (81381)  Min:               Therapeutic Activity (66388)  Min:               Modalities  Min:       Int cervical traction x 15# x 12 min with MHP Well tolerated with towel roll under right UE          Other Therapeutic Activities:  Pt was educated on PT POC, Diagnosis, Prognosis, pathomechanics as well as frequency and duration of scheduling future physical therapy appointments. Time was also taken on this day to answer all patient questions and participation in PT. Reviewed appointment policy in detail with patient and patient verbalized understanding. Home Exercise Program:  Active cervical rotation to each side    Therapeutic Exercise and NMR EXR  [x] (77484) Provided verbal/tactile cueing for activities related to strengthening, flexibility, endurance, ROM  for improvements in cervical, postural, scapular, scapulothoracic and UE control with self care, reaching, carrying, lifting, house/yardwork, driving/computer work.    [] (76250) Provided verbal/tactile cueing for activities related to improving balance, coordination, kinesthetic sense, posture, motor skill, proprioception  to assist with cervical, scapular, scapulothoracic and UE control with self care, reaching, carrying, lifting, house/yardwork, driving/computer work. Therapeutic Activities:    [] (22417 or 35545) Provided verbal/tactile cueing for activities related to improving balance, coordination, kinesthetic sense, posture, motor skill, proprioception and motor activation to allow for proper function of cervical, scapular, scapulothoracic and UE control with self care, carrying, lifting, driving/computer work.      Home Exercise Program:    [x] (65888) Reviewed/Progressed HEP activities related to strengthening, flexibility, endurance, ROM of cervical, scapular, scapulothoracic and UE control with self care, reaching, carrying, lifting, house/yardwork, driving/computer work  [] (39269) Reviewed/Progressed HEP activities related to improving balance, coordination, kinesthetic sense, posture, motor skill, proprioception of cervical, scapular, scapulothoracic and UE control with self care, reaching, carrying, lifting, house/yardwork, driving/computer work      Manual Treatments:  PROM / STM / Oscillations-Mobs:  G-I, II, III, IV (PA's, Inf., Post.)  [x] (33209) Provided manual therapy to mobilize soft tissue/joints of cervical/CT, scapular GHJ and UE for the purpose of decreasing headache, modulating pain, promoting relaxation,  increasing ROM, reducing/eliminating soft tissue swelling/inflammation/restriction, improving soft tissue extensibility and allowing for proper ROM for normal function with self care, reaching, carrying, lifting, house/yardwork, driving/computer work    If Jeffrey Alberts Please Indicate Time In/Out  CPT Code Time in Time out                                   Approval Dates:  CPT Code Units Approved Units Used  Date Updated:                     Charges:  Timed Code Treatment Minutes: 15   Total Treatment Minutes: 60     [] EVAL (LOW) 47427 (typically 20 minutes face-to-face)  [x] EVAL (MOD) 41218 (typically 30 minutes face-to-face)  [] EVAL (HIGH) 64394 (typically 45 minutes face-to-face)  [] RE-EVAL     [] OS(59540) x     [] Dry needle 1 or 2 Muscles (47725)  [] NMR (37210) x     [] Dry needle 3+ Muscles (36133)  [x] Manual (93120) x 15 min    [] Ultrasound (99912) x  [] TA (24600) x     [x] Mech Traction (63845) 12 min   ES(attended) (55150)     [] ES (un) (41951):   [] Vasopump (62144) [] Ionto (87182)   [] Other:    GOALS: Patient stated goal:\"Take this pain away\"  []? Progressing: []? Met: []? Not Met: []? Adjusted     Therapist goals for Patient:   Short Term Goals: To be achieved in: 2 weeks  1. Independent in HEP and progression per patient tolerance, in order to prevent re-injury. []? Progressing: []? Met: []? Not Met: []? Adjusted  2. Patient will have a decrease in pain to facilitate improvement in movement, function, and ADLs as indicated by Functional Deficits. []? Progressing: []? Met: []? Not Met: []?  Adjusted     Long Term Goals: To be achieved in: 4 weeks or by DC  1. Disability index score of 3 % or less for the NDI to assist with reaching prior level of function. []? Progressing: []? Met: []? Not Met: []? Adjusted  2. Patient will demonstrate increased AROM to [facility] of cervical/thoracic spine to allow for proper joint functioning as indicated by patients Functional Deficits. []? Progressing: []? Met: []? Not Met: []? Adjusted  3. Patient will demonstrate an increase in postural awareness and control and activation of  Deep cervical stabilizers to allow for proper functional mobility as indicated by patients Functional Deficits. []? Progressing: []? Met: []? Not Met: []? Adjusted  4. Patient will return to 97% functional activities without increased symptoms or restriction. []? Progressing: []? Met: []? Not Met: []? Adjusted  5. Pt will be free from neck pain and right UE paresthesia and will be able to return to walking for exercise  (patient specific functional goal)    []? Progressing: []? Met: []? Not Met: []? Adjusted             ASSESSMENT:  Patient presents with signs and symptoms consistent with DDD of cervical region, and has radiation into right UE. Patient noted slightly less discomfort after traction treatment today. Treatment/Activity Tolerance:  [x] Patient tolerated treatment well [] Patient limited by fatique  [] Patient limited by pain  [] Patient limited by other medical complications  [] Other:     Overall Progression Towards Functional goals/ Treatment Progress Update:  [] Patient is progressing as expected towards functional goals listed. [] Progression is slowed due to complexities/Impairments listed. [] Progression has been slowed due to co-morbidities.   [x] Plan just implemented, too soon to assess goals progression <30days   [] Goals require adjustment due to lack of progress  [] Patient is not progressing as expected and requires additional follow up with physician  [] Other    Prognosis for POC: [x] Good [] Fair  [] Poor    Patient requires continued skilled intervention: [x] Yes  [] No        PLAN: See eval. CSS questionaire next time due to both \"yes\" answers on form. [] Continue per plan of care [] Alter current plan (see comments)  [x] Plan of care initiated [] Hold pending MD visit [] Discharge    Electronically signed by: Devon Cohen PT    Note: If patient does not return for scheduled/recommended follow up visits, this note will serve as a discharge from care along with the most recent update on progress.

## 2021-08-25 NOTE — PLAN OF CARE
aspercreme  Provocative factors:overdoing activity and stress    Type: []Constant   [x]Intermittent  [x]Radiating []Localized []other:     Numbness/Tingling: None    Occupation/School: retired     Living Status/Prior Level of Function: Independent with ADLs and IADLs,     OBJECTIVE:   Posture:  Erect posture    Functional Mobility/Transfers:  Independent, painfree    Palpation: no tenderness on lateral neck or right UE, but moderate tightness    Bandages/Dressings/Incisions: NA    Gait: (include devices/WB status):  WNL     CERV ROM     Cervical Flexion 43    Cervical Extension 18 with pain into   Right arm     Left Right   Cervical SB 41 37 with pain   Cervical rotation 65 72 with pain   Quadrant     Dorsal Glide      UE ROM Left Right   Shoulder Flex    Shoulder Abd    Shoulder ER WNL 70   Shoulder IR WNL To neutral, painful otherwise   Elbow flex/ext WNL WNL   Wrist flex/ext/pro/sup     Finger flex/ext/opposition     Shoulder AROM WNL w OP     UE Strength  Left Right   Shoulder Flex 5 5   Shoulder Scap 5 5   Shoulder ABd (C5 Axillary)     Shoulder ER  3 3   Shoulder IR 5 5   Elbow Flex (C5 Musc) 5 5   Elbow Ext (C7 Radial) 5 5   Wrist Flex (C6 Radial)     Wrist Ext (C7 Radial)     Finger flex (C8 median)     Finger ext (C7 Radial-PIN)     APB (T1 Median)     Finger Abd (T1 Ulnar)     UE myotomes WNL        Reflexes Normal Abnormal Comments               S1-2 Seated achilles [] []    S1-2 Prone knee bend [] []    L3-4 Patellar tendon [] []    C5-6 Biceps [] [x] Dimiinshed on both sides   C6 Brachioradialis [] [x]    C7-8 Triceps [] [x]      Reflexes/Sensation:    []Dermatomes/Myotomes intact    [x]Reflexes equal and normal bilaterally   [x]Other: Abnormal on right UE and more numb to light  touch    Joint mobility:    []Normal    [x]Hypo   []Hyper    Neurodynamics:     Orthopedic Special Tests:     Cluster Testing  Normal Abnormal N/A Comments   Babinski Test [] [] []    Tena Test [] [] [] Inverted Sup Sign [] [] []    Alar Ligament Test [] [] []    Transverse Ligament Test [] [] []    Sharp-Lakesha Test [] [] []    Hautards Test [] [] []    Vertebral Artery Test [] [] []             Neural dynamic/ Tension testing Normal Abnormal N/A Comments   Spurling Maneuver:  [] [] []    Distraction testing: [] [] []    ULNT [] [] []    Shoulder Abd testing  [] [x] []    Cerv Rot/Lat Flex- 1st Rib [] [] []    Deep Neck Flex/endurance testing [] [] []    Craniocerv Flex testing Lyndal Curd [] [] []    End Range Tolerance testing. [] [] []     [] [] []                           [x] Patient history, allergies, meds reviewed. Medical chart reviewed. See intake form. Review Of Systems (ROS):  [x]Performed Review of systems (Integumentary, CardioPulmonary, Neurological) by intake and observation. Intake form has been scanned into medical record. Patient has been instructed to contact their primary care physician regarding ROS issues if not already being addressed at this time.       Co-morbidities/Complexities (which will affect course of rehabilitation):   []None           Arthritic conditions   []Rheumatoid arthritis (M05.9)  [x]Osteoarthritis (M19.91)   Cardiovascular conditions   [x]Hypertension (I10)  []Hyperlipidemia (E78.5)  []Angina pectoris (I20)  []Atherosclerosis (I70)  []CVA Musculoskeletal conditions   []Disc pathology   []Congenital spine pathologies   []Prior surgical intervention  []Osteoporosis (M81.8)  []Osteopenia (M85.8)   Endocrine conditions   []Hypothyroid (E03.9)  []Hyperthyroid Gastrointestinal conditions   []Constipation (B78.35)   Metabolic conditions   []Morbid obesity (E66.01)  []Diabetes type 1(E10.65) or 2 (E11.65)   []Neuropathy (G60.9)     Pulmonary conditions   []Asthma (J45)  []Coughing   []COPD (J44.9)   Psychological Disorders  []Anxiety (F41.9)  []Depression (F32.9)   []Other:   [x]Other:   H/O cancer       Barriers to/and or personal factors that will affect rehab potential: activities   [x]Reduced participation in social activities. []Reduced participation in sport/recreational activities. Classification/Subgrouping:   [x]signs/symptoms consistent with neck pain with mobility deficits     []signs/symptoms consistent with neck pain with movement coordinated impairments    [x]signs/symptoms consistent with neck pain with radiating pain    []signs/symptoms consistent with neck pain with headaches (cervicogenic)    []Signs/symptoms consistent with nerve root involvement including myotome & dermatome dysfunction   []sign/symptoms consistent with facet dysfunction of cervical and thoracic spine    []signs/symptoms consistent suggesting central cord compression/UMN syndromes   [x]signs/symptoms consistent with discogenic cervical pain   []signs/symptoms consistent with rib dysfunction   []signs/symptoms consistent with postural dysfunction   []signs/symptoms consistent with shoulder pathology    []signs/symptoms consistent with post-surgical status including decreased ROM, strength and function.    []signs/symptoms consistent with pathology which may benefit from Dry Needling   []signs/symptoms which may limit the use of advanced manual therapy techniques: (Elevated CV risk profile, recent trauma, intolerance to end range positions, prior TIA, visual issues, UE neurological compromise )     Prognosis/Rehab Potential:      []Excellent   [x]Good    []Fair   []Poor    Tolerance of evaluation/treatment:    []Excellent   [x]Good    []Fair   []Poor    Physical Therapy Evaluation Complexity Justification  [x] A history of present problem with:  [] no personal factors and/or comorbidities that impact the plan of care;  []1-2 personal factors and/or comorbidities that impact the plan of care  [x]3 personal factors and/or comorbidities that impact the plan of care  [x] An examination of body systems using standardized tests and measures addressing any of the following: body structures and functions (impairments), activity limitations, and/or participation restrictions;:  [] a total of 1-2 or more elements   [x] a total of 3 or more elements   [] a total of 4 or more elements   [x] A clinical presentation with:  [] stable and/or uncomplicated characteristics   [x] evolving clinical presentation with changing characteristics  [] unstable and unpredictable characteristics;   [x] Clinical decision making of [] low, [x] moderate, [] high complexity using standardized patient assessment instrument and/or measurable assessment of functional outcome. [] EVAL (LOW) 74514 (typically 20 minutes face-to-face)  [x] EVAL (MOD) 27352 (typically 30 minutes face-to-face)  [] EVAL (HIGH) 54951 (typically 45 minutes face-to-face)  [] RE-EVAL     PLAN:   Frequency/Duration:  2 days per week for  4  Weeks:  Interventions:  [x]  Therapeutic exercise including: strength training, ROM, for cervical spine,scapula, core and Upper extremity, including postural re-education. [x]  NMR activation and proprioception for Deep cervical flexors, periscapular and RC muscles and Core, including postural re-education. [x]  Manual therapy as indicated for C/T spine, ribs, Soft tissue to include: Dry Needling/IASTM, STM, PROM, Gr I-IV mobilizations, manipulation. [x] Modalities as needed that may include: thermal agents, E-stim, Biofeedback, US, iontophoresis as indicated  [x] Patient education on joint protection, postural re-education, activity modification, progression of HEP. HEP instruction:  (see scanned forms)  Active ROM of cervical spine    GOALS:  Patient stated goal:\"Take this pain away\"  [] Progressing: [] Met: [] Not Met: [] Adjusted    Therapist goals for Patient:   Short Term Goals: To be achieved in: 2 weeks  1. Independent in HEP and progression per patient tolerance, in order to prevent re-injury. [] Progressing: [] Met: [] Not Met: [] Adjusted  2.  Patient will have a decrease in pain to facilitate improvement in movement, function, and ADLs as indicated by Functional Deficits. [] Progressing: [] Met: [] Not Met: [] Adjusted    Long Term Goals: To be achieved in: 4 weeks or by DC  1. Disability index score of 3 % or less for the NDI to assist with reaching prior level of function. [] Progressing: [] Met: [] Not Met: [] Adjusted  2. Patient will demonstrate increased AROM to [facility] of cervical/thoracic spine to allow for proper joint functioning as indicated by patients Functional Deficits. [] Progressing: [] Met: [] Not Met: [] Adjusted  3. Patient will demonstrate an increase in postural awareness and control and activation of  Deep cervical stabilizers to allow for proper functional mobility as indicated by patients Functional Deficits. [] Progressing: [] Met: [] Not Met: [] Adjusted  4. Patient will return to 97% functional activities without increased symptoms or restriction. [] Progressing: [] Met: [] Not Met: [] Adjusted  5. Pt will be free from neck pain and right UE paresthesia and will be able to return to walking for exercise  (patient specific functional goal)    [] Progressing: [] Met: [] Not Met: [] Adjusted         Electronically signed by:  Dharmesh Garcia PT      Note: If patient does not return for scheduled/recommended follow up visits, this note will serve as a discharge from care along with the most recent update on progress.

## 2021-08-30 ENCOUNTER — OFFICE VISIT (OUTPATIENT)
Dept: ORTHOPEDIC SURGERY | Age: 71
End: 2021-08-30
Payer: MEDICARE

## 2021-08-30 VITALS — WEIGHT: 197 LBS | BODY MASS INDEX: 32.82 KG/M2 | HEIGHT: 65 IN

## 2021-08-30 DIAGNOSIS — M54.10 RADICULAR PAIN: ICD-10-CM

## 2021-08-30 DIAGNOSIS — M50.30 DDD (DEGENERATIVE DISC DISEASE), CERVICAL: Primary | ICD-10-CM

## 2021-08-30 DIAGNOSIS — M75.81 RIGHT ROTATOR CUFF TENDONITIS: ICD-10-CM

## 2021-08-30 PROCEDURE — 20610 DRAIN/INJ JOINT/BURSA W/O US: CPT | Performed by: PHYSICIAN ASSISTANT

## 2021-08-30 PROCEDURE — 99214 OFFICE O/P EST MOD 30 MIN: CPT | Performed by: PHYSICIAN ASSISTANT

## 2021-08-31 ENCOUNTER — HOSPITAL ENCOUNTER (OUTPATIENT)
Dept: PHYSICAL THERAPY | Age: 71
Setting detail: THERAPIES SERIES
Discharge: HOME | End: 2021-08-31
Payer: MEDICARE

## 2021-08-31 PROCEDURE — 97110 THERAPEUTIC EXERCISES: CPT

## 2021-08-31 PROCEDURE — 97140 MANUAL THERAPY 1/> REGIONS: CPT

## 2021-08-31 PROCEDURE — 97012 MECHANICAL TRACTION THERAPY: CPT

## 2021-08-31 NOTE — FLOWSHEET NOTE
[facility]. [address]  Phone: [phone]   Fax:     [phone]    Physical Therapy Treatment Note/ Progress Report:     Date:  2021    Patient Name:  Ivory Jara    :  1950  MRN: [id number]    Pertinent Medical History:  HTN. OA, h/o cancer, Hearing aids- bilaterally    Medical/Treatment Diagnosis Information:  · Diagnosis: M50.30 (ICD-10-CM) - DDD (degenerative disc disease), cervical  · Treatment Diagnosis: Neck and right arm pain, limited mobility, UE weakness, difficulty with ADL's    Insurance/Certification information:   [employer] Medicare  Physician Information:  Referring Practitioner: CHARLY Calle  Plan of care signed (Y/N): Sent to Yanira Lucero on 21    Date of Patient follow up with Physician: 21     Progress Report: []  Yes  [x]  No     Date Range for reporting period:  Beginnin2021  Ending:     Progress report due (10 Rx/or 30 days whichever is less): 5.36.52    Recertification due (POC duration/ or 90 days whichever is less):     Visit # Insurance/POC Allowable Auth Needed   2 8 []Yes    [x]No     Functional Outcomes Measure:    Date Assessed: at eval  Test:NDI  Score:5    Pain level: 0/10     SUBJECTIVE:  See eval  21: Pt reports no pain today, since she had an injection from Yanira Lucero yesterday into her R shoulder. She also felt better after the traction last session.     OBJECTIVE: See eval   Observation:    Test measurements:      RESTRICTIONS/PRECAUTIONS:   Exercises/Interventions:   Therapeutic Ex (22045)  Min: Resistance/Repetitions Notes   Dorsal glide Attempted, but made\" arm feel funny\"    Active cervical spinal rotation 10X each direction    OH pulley 3 min    Thoracic rows Red bands x 20                        Manual Intervention (54782)  Min:     Cerv mobs/manip Sub occipital release     Thoracic mobs/manip CT manip     Rib mobilizations     STM Stretching of upper traps         NMR re-education (17946)  Min:               Therapeutic Activity (02304)  Min:               Modalities  Min:       Int cervical traction x 16# x 12 min with MHP Well tolerated with towel roll under right UE          Other Therapeutic Activities:  Pt was educated on PT POC, Diagnosis, Prognosis, pathomechanics as well as frequency and duration of scheduling future physical therapy appointments. Time was also taken on this day to answer all patient questions and participation in PT. Reviewed appointment policy in detail with patient and patient verbalized understanding. Home Exercise Program:  Active cervical rotation to each side    Therapeutic Exercise and NMR EXR  [x] (59385) Provided verbal/tactile cueing for activities related to strengthening, flexibility, endurance, ROM  for improvements in cervical, postural, scapular, scapulothoracic and UE control with self care, reaching, carrying, lifting, house/yardwork, driving/computer work.    [] (29898) Provided verbal/tactile cueing for activities related to improving balance, coordination, kinesthetic sense, posture, motor skill, proprioception  to assist with cervical, scapular, scapulothoracic and UE control with self care, reaching, carrying, lifting, house/yardwork, driving/computer work. Therapeutic Activities:    [] (34723 or 19619) Provided verbal/tactile cueing for activities related to improving balance, coordination, kinesthetic sense, posture, motor skill, proprioception and motor activation to allow for proper function of cervical, scapular, scapulothoracic and UE control with self care, carrying, lifting, driving/computer work.      Home Exercise Program:    [x] (13495) Reviewed/Progressed HEP activities related to strengthening, flexibility, endurance, ROM of cervical, scapular, scapulothoracic and UE control with self care, reaching, carrying, lifting, house/yardwork, driving/computer work  [] (93272) Reviewed/Progressed HEP activities related to improving balance, coordination, kinesthetic sense, posture, motor skill, proprioception of cervical, scapular, scapulothoracic and UE control with self care, reaching, carrying, lifting, house/yardwork, driving/computer work      Manual Treatments:  PROM / STM / Oscillations-Mobs:  G-I, II, III, IV (PA's, Inf., Post.)  [x] (39330) Provided manual therapy to mobilize soft tissue/joints of cervical/CT, scapular GHJ and UE for the purpose of decreasing headache, modulating pain, promoting relaxation,  increasing ROM, reducing/eliminating soft tissue swelling/inflammation/restriction, improving soft tissue extensibility and allowing for proper ROM for normal function with self care, reaching, carrying, lifting, house/yardwork, driving/computer work    If [facility] Please Indicate Time In/Out  CPT Code Time in Time out                                   Approval Dates:  CPT Code Units Approved Units Used  Date Updated:                     Charges:  Timed Code Treatment Minutes: 40   Total Treatment Minutes: 55     [] EVAL (LOW) 74371 (typically 20 minutes face-to-face)  [] EVAL (MOD) 69756 (typically 30 minutes face-to-face)  [] EVAL (HIGH) 51286 (typically 45 minutes face-to-face)  [] RE-EVAL     [x] KU(29989) x  1   [] Dry needle 1 or 2 Muscles (97533)  [] NMR (81638) x     [] Dry needle 3+ Muscles (56743)  [x] Manual (96326) x 15 min    [] Ultrasound (89341) x  [] TA (73346) x     [x] Mech Traction (14596) 15 min   ES(attended) (88098)     [] ES (un) (42096):   [] Vasopump (09393) [] Ionto (58487)   [] Other:    GOALS: Patient stated goal:\"Take this pain away\"  []? Progressing: []? Met: []? Not Met: []? Adjusted     Therapist goals for Patient:   Short Term Goals: To be achieved in: 2 weeks  1. Independent in HEP and progression per patient tolerance, in order to prevent re-injury. []? Progressing: []? Met: []? Not Met: []? Adjusted  2. Patient will have a decrease in pain to facilitate improvement in movement, function, and ADLs as indicated by Functional Deficits. []? Progressing: []? Met: []? Not Met: []? Adjusted     Long Term Goals: To be achieved in: 4 weeks or by DC  1. Disability index score of 3 % or less for the NDI to assist with reaching prior level of function. []? Progressing: []? Met: []? Not Met: []? Adjusted  2. Patient will demonstrate increased AROM to [facility] of cervical/thoracic spine to allow for proper joint functioning as indicated by patients Functional Deficits. []? Progressing: []? Met: []? Not Met: []? Adjusted  3. Patient will demonstrate an increase in postural awareness and control and activation of  Deep cervical stabilizers to allow for proper functional mobility as indicated by patients Functional Deficits. []? Progressing: []? Met: []? Not Met: []? Adjusted  4. Patient will return to 97% functional activities without increased symptoms or restriction. []? Progressing: []? Met: []? Not Met: []? Adjusted  5. Pt will be free from neck pain and right UE paresthesia and will be able to return to walking for exercise  (patient specific functional goal)    []? Progressing: []? Met: []? Not Met: []? Adjusted             ASSESSMENT:  Patient presents with signs and symptoms consistent with DDD of cervical region, and has radiation into right UE. Patient noted slightly less discomfort after traction treatment today. Treatment/Activity Tolerance:  [x] Patient tolerated treatment well [] Patient limited by fatique  [] Patient limited by pain  [] Patient limited by other medical complications  [] Other:     Overall Progression Towards Functional goals/ Treatment Progress Update:  [] Patient is progressing as expected towards functional goals listed. [] Progression is slowed due to complexities/Impairments listed. [] Progression has been slowed due to co-morbidities.   [x] Plan just implemented, too soon to assess goals progression <30days   [] Goals require adjustment due to lack of progress  [] Patient is not progressing as expected and requires additional follow up with physician  [] Other    Prognosis for POC: [x] Good [] Fair  [] Poor    Patient requires continued skilled intervention: [x] Yes  [] No        PLAN: See eval.   Done on 8/31/21. [x] Continue per plan of care [] Alter current plan (see comments)  [] Plan of care initiated [] Hold pending MD visit [] Discharge    Electronically signed by: Adilene Piña PT    Note: If patient does not return for scheduled/recommended follow up visits, this note will serve as a discharge from care along with the most recent update on progress.

## 2021-09-01 ENCOUNTER — TELEPHONE (OUTPATIENT)
Dept: ORTHOPEDIC SURGERY | Age: 71
End: 2021-09-01

## 2021-09-01 NOTE — PROGRESS NOTES
Subjective:      Patient ID: Twila Martinez is a 69 y.o. female who is here for follow up evaluation of right shoulder pain and cervical radicular pain. She completed a Medrol Dosepak which was prescribed on 2021. She is attending physical therapy. She is having increased pain in the right shoulder region with associated active range of motion of the right arm. Her overall pain complaints involving her neck and right upper extremity is a 7/10 VAS. Review Of Systems:   A 14 point review of systems and history form completed by the patient has been reviewed. This form is scanned in the media tab of the patient's chart under 2021 date.      Past Medical History:   Diagnosis Date    Acid reflux     Headache(784.0)     Hyperlipidemia     Hypertension     Osteoarthritis        Family History   Problem Relation Age of Onset    High Blood Pressure Mother     Arthritis Father        Past Surgical History:   Procedure Laterality Date    ANKLE SURGERY      bone spur removed from achilles tendon  in      APPENDECTOMY       SECTION      COLONOSCOPY      HYSTERECTOMY      JOINT REPLACEMENT Left     TKR     KNEE ARTHROSCOPY      both knee    KNEE SURGERY      knee replacement in the left knee    OTHER SURGICAL HISTORY  2017    excision of back mass    UPPER GASTROINTESTINAL ENDOSCOPY N/A 2021    EGD W/ EMR performed by Ward Castellon MD at [address] [facility] N/A 2021    ENDOSCOPIC ULTRASOUND WITH ENDOSCOPIC MUCOSAL RESECTION with 3 clips placed performed by Ward Castellon MD at [address] Not on file   Tobacco Use    Smoking status: Never Smoker    Smokeless tobacco: Never Used    Tobacco comment: quit when 29   Vaping Use    Vaping Use: Never used   Substance and Sexual Activity    Alcohol use: No    Drug use: No    Sexual activity: Not on file       Current Outpatient Medications   Medication Sig Dispense Refill    labetalol (NORMODYNE) 100 MG tablet TAKE ONE TABLET BY MOUTH TWICE A  tablet 5    meloxicam (MOBIC) 15 MG tablet TAKE ONE TABLET BY MOUTH DAILY 15 tablet 1    methylPREDNISolone (MEDROL, ANNIA,) 4 MG tablet Take by mouth. 6 po day one 5 po day 2 4 po day 3 3 po day 4 2 po day 5 1 po day 6. 1 kit 0    predniSONE (DELTASONE) 1 MG tablet Take 1 mg by mouth daily Dose pack       cyclobenzaprine (FLEXERIL) 5 MG tablet Take 5 mg by mouth 3 times daily as needed for Muscle spasms       simvastatin (ZOCOR) 20 MG tablet Take 20 mg by mouth nightly      labetalol (NORMODYNE) 200 MG tablet Take 1 tablet by mouth 2 times daily 60 tablet 5    hydroCHLOROthiazide (HYDRODIURIL) 25 MG tablet Take 1 tablet by mouth daily 90 tablet 5    famotidine (PEPCID) 20 MG tablet Take 1 tablet by mouth 2 times daily as needed (heartburn) 60 tablet 3    omeprazole (PRILOSEC) 40 MG delayed release capsule Take 1 capsule by mouth every morning (before breakfast) 30 capsule 5    RESTASIS 0.05 % ophthalmic emulsion Place 1 drop into both eyes every 12 hours       Omega-3 Fatty Acids (FISH OIL CONCENTRATE PO) Take by mouth       fexofenadine (ALLEGRA) 180 MG tablet Take 180 mg by mouth daily as needed       fluticasone (FLONASE) 50 MCG/ACT nasal spray 1 spray by Each Nostril route daily as needed       acetaminophen (APAP EXTRA STRENGTH) 500 MG tablet Take 1 tablet by mouth every 6 hours as needed for Pain 40 tablet 3     No current facility-administered medications for this visit. Objective:     She is alert, oriented x 3, pleasant, well nourished, developed and in no   acute distress. Ht 5' 5\" (1.651 m)   Wt 197 lb (89.4 kg)   BMI 32.78 kg/m²      Cervical Spine Exam:  There is not deformity. There is  loss of motion. There is  muscular spasm. There is  trapezius/ rhomboid tenderness. There is not spinous process tenderness.   Spurling Test is Positive.     Upper extremities:  She has 5/5 strength of her interosseous muscles, wrist dorsiflexors and volarflexors, biceps, triceps, deltoids, and internal and external rotators of her shoulders, bilaterally. Her biceps, triceps, bracheoradialis, quadriceps and achilles reflexes are 2+, bilaterally. Sensation is intact to light touchfrom C6 to C8. She has no clonus and negative Tena's bilaterally.      Examination of the upper extremities shows intact perfusion to all extremities. She has no cyanosis and digigts are warm to touch, capillary refill is less than 2 seconds. She has no edema noted.      She has intact skin without lacerations or abrasions, no significant erythema, rashes or skin lesions.         Examination of the right shoulder: There is no deformity. There is no erythema. There is no  soft tissue swelling. Deltoid region is  tender to palpation. AC Joint is not tender to palpation. Clavicle is not tender to palpation. Bicipital Groove is not  tender to palpation. Pectoralis  is not tender to palpation. Scapula/ trapezius is not tender to palpation. There is no weakness with supraspinatus testing. There is moderate pain with supraspinatus testing. Yergason Test negative. Drop Arm Test negative. Apprehension Test negative. Cross Arm Test negative. Shoulder Active ROM -    FF 17   IR to L5 ER 80      X Rays: not performed in the office today:       Diagnosis:       ICD-10-CM    1. DDD (degenerative disc disease), cervical  M50.30 MRI CERVICAL SPINE WO CONTRAST   2. Radicular pain  M54.10 MRI CERVICAL SPINE WO CONTRAST   3. Right rotator cuff tendonitis  M75.81 MS ARTHROCENTESIS ASPIR&/INJ MAJOR JT/BURSA W/O US     MS TRIAMCINOLONE ACETONIDE INJ        Assessment and Plan:       Assessment:  Cervical radicular pain affecting right upper extremity. She remains neurologically intact.   Probably some overlapping right shoulder rotator cuff tendinitis or partial thickness rotator cuff tear. Failed to resolve with conservative treatment including medications and therapy. I had an extensive discussion with Ms. Omkar Roberts regarding the natural history, etiology, and long term consequences of her condition. I have presented reasonable alternatives to the patient's proposed care, treatment, and services. Risks and benefits of the treatment options also reviewed in detail. I have outlined a treatment plan with them. She has had full opportunity to ask her questions. I have answered them all to her satisfaction. I feel that Ms. Omkar Roberts understands our discussion today. Plan:  Medications- OTC NSAIDS discussed. She  was advised that NSAID-type medications have several important potential side effects: gastrointestinal irritation including hemorrhage, cardiac events and renal injuries. She was asked to take the medication with food and to stop if she experiences any GI upset. I asked her to call for vomiting, abdominal pain or black/bloody stools. She should have renal function testing per his medical provider periodically. She expresses understanding of these risks associated with NSAID use and questions were answered. PT-continue physical therapy. Further Imaging-MRI cervical spine to evaluate for cervical HNP or cervical stenosis. Procedures-recommended a right shoulder subacromial injection today to help differentiate her right shoulder pain from her cervical radicular pain. Cortisone  Injection  PROCEDURE NOTE:  Pre op Diagnosis: Shoulder pain  Post op Diagnosis: Same  With Conception Dings permission, her right shoulder was prepped  in standard sterile fashion with  Alcohol and 2 cc of 0.25% Marcaine and 1 cc of Kenalog 40 mg was injected into the right lateral subacromial space  without difficulty. She tolerated this well without difficulty. A band-aid was applied.  She was advised to ice the shoulder for 15-20 minutes to relieve any injection site related pain. Follow up- 1-2 weeks. Call or return to clinic if these symptoms worsen or fail to improve as anticipated. Cheli Bauer PA-C   Senior Physician Assistant   [facility]                                         Disclaimer: This note was generated with use of a verbal recognition program (DRAGON) and an attempt was made to check for errors. It is possible that there are still dictated errors within this office note. If so, please bring any significant errors to my attention for an addendum. All efforts were made to ensure that this office note is accurate.

## 2021-09-01 NOTE — TELEPHONE ENCOUNTER
MRI CERVICAL SPINE approved Auth# [id number] - JLB    The patient was informed her MRI was approved. She will call [facility] scheduling at [phone] to schedule.

## 2021-09-02 ENCOUNTER — HOSPITAL ENCOUNTER (OUTPATIENT)
Dept: PHYSICAL THERAPY | Age: 71
Setting detail: THERAPIES SERIES
Discharge: HOME | End: 2021-09-02
Payer: MEDICARE

## 2021-09-02 PROCEDURE — 97012 MECHANICAL TRACTION THERAPY: CPT

## 2021-09-02 PROCEDURE — 97110 THERAPEUTIC EXERCISES: CPT

## 2021-09-02 PROCEDURE — 97140 MANUAL THERAPY 1/> REGIONS: CPT

## 2021-09-02 NOTE — FLOWSHEET NOTE
[facility]. [address]  Phone: [phone]   Fax:     [phone]    Physical Therapy Treatment Note/ Progress Report:     Date:  2021    Patient Name:  Norberto Biswas    :  1950  MRN: [id number]    Pertinent Medical History:  HTN. OA, h/o cancer, Hearing aids- bilaterally    Medical/Treatment Diagnosis Information:  · Diagnosis: M50.30 (ICD-10-CM) - DDD (degenerative disc disease), cervical  · Treatment Diagnosis: Neck and right arm pain, limited mobility, UE weakness, difficulty with ADL's    Insurance/Certification information:   [facility] Medicare  Physician Information:  Referring Practitioner: CHARLY Dhillon  Plan of care signed (Y/N): Sent to Luis Armando Marion on 21    Date of Patient follow up with Physician: 21     Progress Report: []  Yes  [x]  No     Date Range for reporting period:  Beginnin2021  Ending:     Progress report due (10 Rx/or 30 days whichever is less): 2.01.    Recertification due (POC duration/ or 90 days whichever is less):     Visit # Insurance/POC Allowable Auth Needed   3 8 []Yes    [x]No     Functional Outcomes Measure:    Date Assessed: at eval  Test:NDI  Score:5    Pain level: 0/10     SUBJECTIVE:  See eval  21: Pt reports no pain today, since she had an injection from Luis Armando Hole yesterday into her R shoulder. She also felt better after the traction last session. 21: Pt reports no pain but states that she did have some tingling in her right arm yesterday, whih is gone today. The injection made her feel better.     OBJECTIVE: See eval   Observation:    Test measurements:      RESTRICTIONS/PRECAUTIONS:   Exercises/Interventions:   Therapeutic Ex (44069)  Min: Resistance/Repetitions Notes   Omit, as this consistently makes pt have tingling on right scapular border    Active cervical spinal rotation 10X each direction OH pulley 3 min    Thoracic rows Red bands x 10, blue band x 10                        Manual Intervention (17717)  Min:     Cerv mobs/manip Sub occipital release     Thoracic mobs/manip     CT manip     Rib mobilizations     STM Stretching of upper traps         NMR re-education (55072)  Min:               Therapeutic Activity (05127)  Min:               Modalities  Min:       Int cervical traction x 17# x 12 min with MHP Well tolerated with towel roll under right UE, has some tingling          Other Therapeutic Activities:  Pt was educated on PT POC, Diagnosis, Prognosis, pathomechanics as well as frequency and duration of scheduling future physical therapy appointments. Time was also taken on this day to answer all patient questions and participation in PT. Reviewed appointment policy in detail with patient and patient verbalized understanding. Home Exercise Program:  Active cervical rotation to each side    Therapeutic Exercise and NMR EXR  [x] (32652) Provided verbal/tactile cueing for activities related to strengthening, flexibility, endurance, ROM  for improvements in cervical, postural, scapular, scapulothoracic and UE control with self care, reaching, carrying, lifting, house/yardwork, driving/computer work.    [] (50700) Provided verbal/tactile cueing for activities related to improving balance, coordination, kinesthetic sense, posture, motor skill, proprioception  to assist with cervical, scapular, scapulothoracic and UE control with self care, reaching, carrying, lifting, house/yardwork, driving/computer work. Therapeutic Activities:    [] (98387 or 80962) Provided verbal/tactile cueing for activities related to improving balance, coordination, kinesthetic sense, posture, motor skill, proprioception and motor activation to allow for proper function of cervical, scapular, scapulothoracic and UE control with self care, carrying, lifting, driving/computer work.      Home Exercise Program:    [x] Patient:   Short Term Goals: To be achieved in: 2 weeks  1. Independent in HEP and progression per patient tolerance, in order to prevent re-injury. []? Progressing: []? Met: []? Not Met: []? Adjusted  2. Patient will have a decrease in pain to facilitate improvement in movement, function, and ADLs as indicated by Functional Deficits. []? Progressing: []? Met: []? Not Met: []? Adjusted     Long Term Goals: To be achieved in: 4 weeks or by DC  1. Disability index score of 3 % or less for the NDI to assist with reaching prior level of function. []? Progressing: []? Met: []? Not Met: []? Adjusted  2. Patient will demonstrate increased AROM to [facility] of cervical/thoracic spine to allow for proper joint functioning as indicated by patients Functional Deficits. []? Progressing: []? Met: []? Not Met: []? Adjusted  3. Patient will demonstrate an increase in postural awareness and control and activation of  Deep cervical stabilizers to allow for proper functional mobility as indicated by patients Functional Deficits. []? Progressing: []? Met: []? Not Met: []? Adjusted  4. Patient will return to 97% functional activities without increased symptoms or restriction. []? Progressing: []? Met: []? Not Met: []? Adjusted  5. Pt will be free from neck pain and right UE paresthesia and will be able to return to walking for exercise  (patient specific functional goal)    []? Progressing: []? Met: []? Not Met: []? Adjusted             ASSESSMENT:  Patient presents with signs and symptoms consistent with DDD of cervical region, and has radiation into right UE. Patient noted slightly less discomfort after traction treatment today.     Treatment/Activity Tolerance:  [x] Patient tolerated treatment well [] Patient limited by fatique  [] Patient limited by pain  [] Patient limited by other medical complications  [] Other:     Overall Progression Towards Functional goals/ Treatment Progress Update:  [] Patient is progressing as expected towards functional goals listed. [] Progression is slowed due to complexities/Impairments listed. [] Progression has been slowed due to co-morbidities. [x] Plan just implemented, too soon to assess goals progression <30days   [] Goals require adjustment due to lack of progress  [] Patient is not progressing as expected and requires additional follow up with physician  [] Other    Prognosis for POC: [x] Good [] Fair  [] Poor    Patient requires continued skilled intervention: [x] Yes  [] No        PLAN: See eval.   Done on 8/31/21. [x] Continue per plan of care [] Alter current plan (see comments)  [] Plan of care initiated [] Hold pending MD visit [] Discharge    Electronically signed by: Bartolome Blanc PT    Note: If patient does not return for scheduled/recommended follow up visits, this note will serve as a discharge from care along with the most recent update on progress.

## 2021-09-07 ENCOUNTER — HOSPITAL ENCOUNTER (OUTPATIENT)
Dept: PHYSICAL THERAPY | Age: 71
Setting detail: THERAPIES SERIES
Discharge: HOME | End: 2021-09-07
Payer: MEDICARE

## 2021-09-07 PROCEDURE — 97110 THERAPEUTIC EXERCISES: CPT

## 2021-09-07 PROCEDURE — 97140 MANUAL THERAPY 1/> REGIONS: CPT

## 2021-09-07 PROCEDURE — 97012 MECHANICAL TRACTION THERAPY: CPT

## 2021-09-07 NOTE — FLOWSHEET NOTE
makes pt have tingling on right scapular border    Active cervical spinal rotation 10X each direction    OH pulley 3 min    Thoracic rows Red bands x 10, blue band x 10 Issues blue band today. Manual Intervention (85717)  Min:     Cerv mobs/manip Sub occipital release     Thoracic mobs/manip     CT manip     Rib mobilizations     STM Stretching of upper traps         NMR re-education (04788)  Min:               Therapeutic Activity (02562)  Min:               Modalities  Min:       Int cervical traction x 18# x 12 min with MHP Well tolerated with towel roll under right UE, has some tingling          Other Therapeutic Activities:  Pt was educated on PT POC, Diagnosis, Prognosis, pathomechanics as well as frequency and duration of scheduling future physical therapy appointments. Time was also taken on this day to answer all patient questions and participation in PT. Reviewed appointment policy in detail with patient and patient verbalized understanding. Home Exercise Program:  Active cervical rotation to each side    Therapeutic Exercise and NMR EXR  [x] (10853) Provided verbal/tactile cueing for activities related to strengthening, flexibility, endurance, ROM  for improvements in cervical, postural, scapular, scapulothoracic and UE control with self care, reaching, carrying, lifting, house/yardwork, driving/computer work.    [] (43382) Provided verbal/tactile cueing for activities related to improving balance, coordination, kinesthetic sense, posture, motor skill, proprioception  to assist with cervical, scapular, scapulothoracic and UE control with self care, reaching, carrying, lifting, house/yardwork, driving/computer work.     Therapeutic Activities:    [] (74133 or 77070) Provided verbal/tactile cueing for activities related to improving balance, coordination, kinesthetic sense, posture, motor skill, proprioception and motor activation to allow for proper function of cervical, scapular, scapulothoracic and UE control with self care, carrying, lifting, driving/computer work.      Home Exercise Program:    [x] (56488) Reviewed/Progressed HEP activities related to strengthening, flexibility, endurance, ROM of cervical, scapular, scapulothoracic and UE control with self care, reaching, carrying, lifting, house/yardwork, driving/computer work  [] (56627) Reviewed/Progressed HEP activities related to improving balance, coordination, kinesthetic sense, posture, motor skill, proprioception of cervical, scapular, scapulothoracic and UE control with self care, reaching, carrying, lifting, house/yardwork, driving/computer work      Manual Treatments:  PROM / STM / Oscillations-Mobs:  G-I, II, III, IV (PA's, Inf., Post.)  [x] (67078) Provided manual therapy to mobilize soft tissue/joints of cervical/CT, scapular GHJ and UE for the purpose of decreasing headache, modulating pain, promoting relaxation,  increasing ROM, reducing/eliminating soft tissue swelling/inflammation/restriction, improving soft tissue extensibility and allowing for proper ROM for normal function with self care, reaching, carrying, lifting, house/yardwork, driving/computer work    If [facility] Please Indicate Time In/Out  CPT Code Time in Time out                                   Approval Dates:  CPT Code Units Approved Units Used  Date Updated:                     Charges:  Timed Code Treatment Minutes: 30   Total Treatment Minutes: 45     [] EVAL (LOW) 63681 (typically 20 minutes face-to-face)  [] EVAL (MOD) 54953 (typically 30 minutes face-to-face)  [] EVAL (HIGH) 18607 (typically 45 minutes face-to-face)  [] RE-EVAL     [x] JL(65634) x  1   [] Dry needle 1 or 2 Muscles (96751)  [] NMR (65235) x     [] Dry needle 3+ Muscles (78286)  [x] Manual (56459) x 15 min    [] Ultrasound (58395) x  [] TA (93746) x     [x] Mech Traction (80357) 15 min   ES(attended) (17168)     [] ES (un) (89286):   [] Vasopump (98261) [] Ionto (29890)   [] complications  [] Other:     Overall Progression Towards Functional goals/ Treatment Progress Update:  [] Patient is progressing as expected towards functional goals listed. [] Progression is slowed due to complexities/Impairments listed. [] Progression has been slowed due to co-morbidities. [x] Plan just implemented, too soon to assess goals progression <30days   [] Goals require adjustment due to lack of progress  [] Patient is not progressing as expected and requires additional follow up with physician  [] Other    Prognosis for POC: [x] Good [] Fair  [] Poor    Patient requires continued skilled intervention: [x] Yes  [] No        PLAN: See eval.   Done on 8/31/21. [x] Continue per plan of care [] Alter current plan (see comments)  [] Plan of care initiated [] Hold pending MD visit [] Discharge    Electronically signed by: Annia Wang PT    Note: If patient does not return for scheduled/recommended follow up visits, this note will serve as a discharge from care along with the most recent update on progress.

## 2021-09-09 ENCOUNTER — HOSPITAL ENCOUNTER (OUTPATIENT)
Dept: PHYSICAL THERAPY | Age: 71
Setting detail: THERAPIES SERIES
Discharge: HOME | End: 2021-09-09
Payer: MEDICARE

## 2021-09-09 PROCEDURE — 97012 MECHANICAL TRACTION THERAPY: CPT

## 2021-09-09 PROCEDURE — 97110 THERAPEUTIC EXERCISES: CPT

## 2021-09-09 PROCEDURE — 97140 MANUAL THERAPY 1/> REGIONS: CPT

## 2021-09-09 NOTE — FLOWSHEET NOTE
[facility]. [address]  Phone: [phone]   Fax:     [phone]    Physical Therapy Treatment Note/ Progress Report:     Date:  2021    Patient Name:  Ulises Garces    :  1950  MRN: [id number]    Pertinent Medical History:  HTN. OA, h/o cancer, Hearing aids- bilaterally    Medical/Treatment Diagnosis Information:  · Diagnosis: M50.30 (ICD-10-CM) - DDD (degenerative disc disease), cervical  · Treatment Diagnosis: Neck and right arm pain, limited mobility, UE weakness, difficulty with ADL's    Insurance/Certification information:   [employer]  Physician Information:  Referring Practitioner: CHARLY Boyle  Plan of care signed (Y/N): Sent to Fritz Hong on 21    Date of Patient follow up with Physician: 21     Progress Report: []  Yes  [x]  No     Date Range for reporting period:  Beginnin2021  Ending:     Progress report due (10 Rx/or 30 days whichever is less): 7.44.36    Recertification due (POC duration/ or 90 days whichever is less):     Visit # Insurance/POC Allowable Auth Needed   5 8 []Yes    [x]No     Functional Outcomes Measure:    Date Assessed: at eval  Test:NDI  Score:5    Pain level: 0/10     SUBJECTIVE:  See eval  21: Pt reports no pain today, since she had an injection from Fritz Hong yesterday into her R shoulder. She also felt better after the traction last session. 21: Pt reports no pain but states that she did have some tingling in her right arm yesterday, which is gone today. The injection made her feel better. 21: Pt reports that her tingling persists. She has no pain. She will have an MRI on Friday. 21: Pt reports that tingling persists in her Right UE, but she has no pain.     OBJECTIVE: See eval   Observation:    Test measurements:      RESTRICTIONS/PRECAUTIONS:   Exercises/Interventions: reaching, carrying, lifting, house/yardwork, driving/computer work. Therapeutic Activities:    [] (94834 or 28923) Provided verbal/tactile cueing for activities related to improving balance, coordination, kinesthetic sense, posture, motor skill, proprioception and motor activation to allow for proper function of cervical, scapular, scapulothoracic and UE control with self care, carrying, lifting, driving/computer work.      Home Exercise Program:    [x] (93507) Reviewed/Progressed HEP activities related to strengthening, flexibility, endurance, ROM of cervical, scapular, scapulothoracic and UE control with self care, reaching, carrying, lifting, house/yardwork, driving/computer work  [] (31920) Reviewed/Progressed HEP activities related to improving balance, coordination, kinesthetic sense, posture, motor skill, proprioception of cervical, scapular, scapulothoracic and UE control with self care, reaching, carrying, lifting, house/yardwork, driving/computer work      Manual Treatments:  PROM / STM / Oscillations-Mobs:  G-I, II, III, IV (PA's, Inf., Post.)  [x] (96990) Provided manual therapy to mobilize soft tissue/joints of cervical/CT, scapular GHJ and UE for the purpose of decreasing headache, modulating pain, promoting relaxation,  increasing ROM, reducing/eliminating soft tissue swelling/inflammation/restriction, improving soft tissue extensibility and allowing for proper ROM for normal function with self care, reaching, carrying, lifting, house/yardwork, driving/computer work    If [facility] Please Indicate Time In/Out  CPT Code Time in Time out                                   Approval Dates:  CPT Code Units Approved Units Used  Date Updated:                     Charges:  Timed Code Treatment Minutes:45 45   Total Treatment Minutes: 60     [] EVAL (LOW) 76639 (typically 20 minutes face-to-face)  [] EVAL (MOD) 59868 (typically 30 minutes face-to-face)  [] EVAL (HIGH) 25815 (typically 45 minutes face-to-face)  [] RE-EVAL     [x] GR(93072) x  1   [] Dry needle 1 or 2 Muscles (25859)  [] NMR (93924) x     [] Dry needle 3+ Muscles (02481)  [x] Manual (12361) x 30 min    [] Ultrasound (53676) x  [] TA (87351) x     [x] Mech Traction (07249) 15 min   ES(attended) (91366)     [] ES (un) (30915):   [] Vasopump (00323) [] Ionto (42466)   [] Other:    GOALS: Patient stated goal:\"Take this pain away\"  []? Progressing: []? Met: []? Not Met: []? Adjusted     Therapist goals for Patient:   Short Term Goals: To be achieved in: 2 weeks  1. Independent in HEP and progression per patient tolerance, in order to prevent re-injury. []? Progressing: []? Met: []? Not Met: []? Adjusted  2. Patient will have a decrease in pain to facilitate improvement in movement, function, and ADLs as indicated by Functional Deficits. []? Progressing: []? Met: []? Not Met: []? Adjusted     Long Term Goals: To be achieved in: 4 weeks or by DC  1. Disability index score of 3 % or less for the NDI to assist with reaching prior level of function. []? Progressing: []? Met: []? Not Met: []? Adjusted  2. Patient will demonstrate increased AROM to [facility] of cervical/thoracic spine to allow for proper joint functioning as indicated by patients Functional Deficits. []? Progressing: []? Met: []? Not Met: []? Adjusted  3. Patient will demonstrate an increase in postural awareness and control and activation of  Deep cervical stabilizers to allow for proper functional mobility as indicated by patients Functional Deficits. []? Progressing: []? Met: []? Not Met: []? Adjusted  4. Patient will return to 97% functional activities without increased symptoms or restriction. []? Progressing: []? Met: []? Not Met: []? Adjusted  5. Pt will be free from neck pain and right UE paresthesia and will be able to return to walking for exercise  (patient specific functional goal)    []? Progressing: []? Met: []? Not Met: []?  Adjusted             ASSESSMENT:  Patient presents with signs and symptoms consistent with DDD of cervical region, and has radiation into right UE. Patient noted slightly less discomfort after traction treatment today. Treatment/Activity Tolerance:  [x] Patient tolerated treatment well [] Patient limited by fatique  [] Patient limited by pain  [] Patient limited by other medical complications  [] Other:     Overall Progression Towards Functional goals/ Treatment Progress Update:  [x] Patient is progressing as expected towards functional goals listed. [] Progression is slowed due to complexities/Impairments listed. [] Progression has been slowed due to co-morbidities. [] Plan just implemented, too soon to assess goals progression <30days   [] Goals require adjustment due to lack of progress  [] Patient is not progressing as expected and requires additional follow up with physician  [] Other    Prognosis for POC: [x] Good [] Fair  [] Poor    Patient requires continued skilled intervention: [x] Yes  [] No        PLAN: See dimas.   Done on 8/31/21. Will have MRI 9/10/21. [x] Continue per plan of care [] Alter current plan (see comments)  [] Plan of care initiated [] Hold pending MD visit [] Discharge    Electronically signed by: Jered Cummins, PT    Note: If patient does not return for scheduled/recommended follow up visits, this note will serve as a discharge from care along with the most recent update on progress.

## 2021-09-10 ENCOUNTER — HOSPITAL ENCOUNTER (OUTPATIENT)
Dept: MRI IMAGING | Age: 71
Discharge: HOME | End: 2021-09-10
Payer: MEDICARE

## 2021-09-10 DIAGNOSIS — M54.10 RADICULAR PAIN: ICD-10-CM

## 2021-09-10 DIAGNOSIS — M50.30 DDD (DEGENERATIVE DISC DISEASE), CERVICAL: ICD-10-CM

## 2021-09-10 PROCEDURE — 72141 MRI NECK SPINE W/O DYE: CPT

## 2021-09-14 ENCOUNTER — HOSPITAL ENCOUNTER (OUTPATIENT)
Dept: PHYSICAL THERAPY | Age: 71
Setting detail: THERAPIES SERIES
Discharge: HOME | End: 2021-09-14
Payer: MEDICARE

## 2021-09-14 ENCOUNTER — OFFICE VISIT (OUTPATIENT)
Dept: ORTHOPEDIC SURGERY | Age: 71
End: 2021-09-14
Payer: MEDICARE

## 2021-09-14 VITALS — RESPIRATION RATE: 16 BRPM | BODY MASS INDEX: 31.99 KG/M2 | HEIGHT: 65 IN | WEIGHT: 192 LBS

## 2021-09-14 DIAGNOSIS — M48.02 CERVICAL STENOSIS OF SPINE: Primary | ICD-10-CM

## 2021-09-14 PROCEDURE — 97140 MANUAL THERAPY 1/> REGIONS: CPT

## 2021-09-14 PROCEDURE — 97110 THERAPEUTIC EXERCISES: CPT

## 2021-09-14 PROCEDURE — 99214 OFFICE O/P EST MOD 30 MIN: CPT | Performed by: PHYSICIAN ASSISTANT

## 2021-09-14 PROCEDURE — 97012 MECHANICAL TRACTION THERAPY: CPT

## 2021-09-14 NOTE — FLOWSHEET NOTE
[facility] [place]. [address]  Phone: [phone]   Fax:     [phone]    Physical Therapy Treatment Note/ Progress Report:     Date:  2021    Patient Name:  Mirza Johnson    :  1950  MRN: [id number]    Pertinent Medical History:  HTN. OA, h/o cancer, Hearing aids- bilaterally    Medical/Treatment Diagnosis Information:  · Diagnosis: M50.30 (ICD-10-CM) - DDD (degenerative disc disease), cervical  · Treatment Diagnosis: Neck and right arm pain, limited mobility, UE weakness, difficulty with ADL's    Insurance/Certification information:   [facility] Medicare  Physician Information:  Referring Practitioner: CHARLY Macdonald  Plan of care signed (Y/N): Sent to Lucian Lopez on 21    Date of Patient follow up with Physician: 21     Progress Report: []  Yes  [x]  No     Date Range for reporting period:  Beginnin2021  Ending:     Progress report due (10 Rx/or 30 days whichever is less): 3    Recertification due (POC duration/ or 90 days whichever is less):     Visit # Insurance/POC Allowable Auth Needed   6 8 []Yes    [x]No     Functional Outcomes Measure:    Date Assessed: at eval  Test:NDI  Score:5    Pain level: 0/10     SUBJECTIVE:  See eval  21: Pt reports no pain today, since she had an injection from Lucian Lopez yesterday into her R shoulder. She also felt better after the traction last session. 21: Pt reports no pain but states that she did have some tingling in her right arm yesterday, which is gone today. The injection made her feel better. 21: Pt reports that her tingling persists. She has no pain. She will have an MRI on Friday. 21: Pt reports that tingling persists in her Right UE, but she has no pain. 21: Pt reports that she had her MRI on Friday and will learn the results today after this apt.  No pain , still the the tingling throughout entire Right arm. OBJECTIVE: See eval   Observation:    Test measurements:      RESTRICTIONS/PRECAUTIONS:   Exercises/Interventions:   Therapeutic Ex (95245)  Min: Resistance/Repetitions Notes   Dorsal glideAble to do 10X supine    Active cervical spinal rotation 10X each direction    OH pulley 3 min    Thoracic rows Red bands x 15, blue band x 15 Issues blue band today. Upper trap stretch 5 sec x 10 to left +HEP                  Manual Intervention (53971)  Min:     Cerv mobs/manip Sub occipital release , MET with rotation    Thoracic mobs/manip     CT manip     Rib mobilizations     STM Stretching of upper traps, right  UE stretch and ULTT         NMR re-education (78063)  Min:               Therapeutic Activity (73691)  Min:               Modalities  Min:       Int cervical traction x 20# x 15 min with MHP Well tolerated with towel roll under right UE, has some tingling          Other Therapeutic Activities:  Pt was educated on PT POC, Diagnosis, Prognosis, pathomechanics as well as frequency and duration of scheduling future physical therapy appointments. Time was also taken on this day to answer all patient questions and participation in PT. Reviewed appointment policy in detail with patient and patient verbalized understanding. Home Exercise Program:  Active cervical rotation to each side  Access Code: [email or web address]  URL: [email or web address]  Date: 09/09/2021  Prepared by:  Khris Banda    Exercises  Seated Upper Trapezius Stretch - 1 x daily - 7 x weekly - 3 sets - 10 reps    Therapeutic Exercise and NMR EXR  [x] (66564) Provided verbal/tactile cueing for activities related to strengthening, flexibility, endurance, ROM  for improvements in cervical, postural, scapular, scapulothoracic and UE control with self care, reaching, carrying, lifting, house/yardwork, driving/computer work.    [] (25117) Provided verbal/tactile cueing for activities related to improving balance, coordination, kinesthetic sense, posture, motor skill, proprioception  to assist with cervical, scapular, scapulothoracic and UE control with self care, reaching, carrying, lifting, house/yardwork, driving/computer work. Therapeutic Activities:    [] (11074 or 66041) Provided verbal/tactile cueing for activities related to improving balance, coordination, kinesthetic sense, posture, motor skill, proprioception and motor activation to allow for proper function of cervical, scapular, scapulothoracic and UE control with self care, carrying, lifting, driving/computer work.      Home Exercise Program:    [x] (88565) Reviewed/Progressed HEP activities related to strengthening, flexibility, endurance, ROM of cervical, scapular, scapulothoracic and UE control with self care, reaching, carrying, lifting, house/yardwork, driving/computer work  [] (73511) Reviewed/Progressed HEP activities related to improving balance, coordination, kinesthetic sense, posture, motor skill, proprioception of cervical, scapular, scapulothoracic and UE control with self care, reaching, carrying, lifting, house/yardwork, driving/computer work      Manual Treatments:  PROM / STM / Oscillations-Mobs:  G-I, II, III, IV (PA's, Inf., Post.)  [x] (47369) Provided manual therapy to mobilize soft tissue/joints of cervical/CT, scapular GHJ and UE for the purpose of decreasing headache, modulating pain, promoting relaxation,  increasing ROM, reducing/eliminating soft tissue swelling/inflammation/restriction, improving soft tissue extensibility and allowing for proper ROM for normal function with self care, reaching, carrying, lifting, house/yardwork, driving/computer work    If [facility]ts Please Indicate Time In/Out  CPT Code Time in Time out                                   Approval Dates:  CPT Code Units Approved Units Used  Date Updated:                     Charges:  Timed Code Treatment Minutes: 35   Total Treatment Minutes: 50     [] ALLISON (LOW) 41814 (typically 20 minutes face-to-face)  [] EVAL (MOD) 74913 (typically 30 minutes face-to-face)  [] EVAL (HIGH) 41973 (typically 45 minutes face-to-face)  [] RE-EVAL     [x] TZ(35531) x  1   [] Dry needle 1 or 2 Muscles (58749)  [] NMR (01791) x     [] Dry needle 3+ Muscles (31774)  [x] Manual (64369) x 1    [] Ultrasound (94940) x  [] TA (12348) x     [x] Mech Traction (52304) 15 min   ES(attended) (60206)     [] ES (un) (67189):   [] Vasopump (05297) [] Ionto (13108)   [] Other:    GOALS: Patient stated goal:\"Take this pain away\"  []? Progressing: []? Met: []? Not Met: []? Adjusted     Therapist goals for Patient:   Short Term Goals: To be achieved in: 2 weeks  1. Independent in HEP and progression per patient tolerance, in order to prevent re-injury. []? Progressing: []? Met: []? Not Met: []? Adjusted  2. Patient will have a decrease in pain to facilitate improvement in movement, function, and ADLs as indicated by Functional Deficits. []? Progressing: []? Met: []? Not Met: []? Adjusted     Long Term Goals: To be achieved in: 4 weeks or by DC  1. Disability index score of 3 % or less for the NDI to assist with reaching prior level of function. []? Progressing: []? Met: []? Not Met: []? Adjusted  2. Patient will demonstrate increased AROM to [facility] of cervical/thoracic spine to allow for proper joint functioning as indicated by patients Functional Deficits. []? Progressing: []? Met: []? Not Met: []? Adjusted  3. Patient will demonstrate an increase in postural awareness and control and activation of  Deep cervical stabilizers to allow for proper functional mobility as indicated by patients Functional Deficits. []? Progressing: []? Met: []? Not Met: []? Adjusted  4. Patient will return to 97% functional activities without increased symptoms or restriction. []? Progressing: []? Met: []? Not Met: []? Adjusted  5.   Pt will be free from neck pain and right UE paresthesia and will be able to return to walking for exercise  (patient specific functional goal)    []? Progressing: []? Met: []? Not Met: []? Adjusted             ASSESSMENT:  Patient presents with signs and symptoms consistent with DDD of cervical region, and has radiation into right UE. Patient noted slightly less discomfort after traction treatment today. Treatment/Activity Tolerance:  [x] Patient tolerated treatment well [] Patient limited by fatique  [] Patient limited by pain  [] Patient limited by other medical complications  [] Other:     Overall Progression Towards Functional goals/ Treatment Progress Update:  [x] Patient is progressing as expected towards functional goals listed. [] Progression is slowed due to complexities/Impairments listed. [] Progression has been slowed due to co-morbidities. [] Plan just implemented, too soon to assess goals progression <30days   [] Goals require adjustment due to lack of progress  [] Patient is not progressing as expected and requires additional follow up with physician  [] Other    Prognosis for POC: [x] Good [] Fair  [] Poor    Patient requires continued skilled intervention: [x] Yes  [] No        PLAN: See eval.   Done on 8/31/21. Will have MRI 9/10/21. [x] Continue per plan of care [] Alter current plan (see comments)  [] Plan of care initiated [] Hold pending MD visit [] Discharge    Electronically signed by: Tabitha Carlin PT    Note: If patient does not return for scheduled/recommended follow up visits, this note will serve as a discharge from care along with the most recent update on progress.

## 2021-09-14 NOTE — PROGRESS NOTES
Subjective:      Patient ID: Norberto Moreno is a 69 y.o. female who is here for follow up evaluation of her arm, cervical radicular pain. She is here today to review MRI results. She continues to have significant right arm complaints of pain, numbness and tingling. She completed a Medrol Dosepak which was prescribed on 2021. She is attending physical therapy. She is having increased pain in the right shoulder region with associated active range of motion of the right arm. Her overall pain complaints involving her neck and right upper extremity is a 7/10 VAS. Review Of Systems:   A 14 point review of systems and history form completed by the patient has been reviewed. This form is scanned in the media tab of the patient's chart under 2021 date.   Musculoskeletal ROS: positive for - joint pain, joint stiffness and joint swelling  negative for - muscular weakness       Past Medical History:   Diagnosis Date    Acid reflux     Headache(784.0)     Hyperlipidemia     Hypertension     Osteoarthritis        Family History   Problem Relation Age of Onset    High Blood Pressure Mother     Arthritis Father        Past Surgical History:   Procedure Laterality Date    ANKLE SURGERY      bone spur removed from achilles tendon  in      APPENDECTOMY       SECTION      COLONOSCOPY      HYSTERECTOMY      JOINT REPLACEMENT Left     TKR     KNEE ARTHROSCOPY      both knee    KNEE SURGERY      knee replacement in the left knee    OTHER SURGICAL HISTORY  2017    excision of back mass    UPPER GASTROINTESTINAL ENDOSCOPY N/A 2021    EGD W/ EMR performed by Xavier Bradshaw MD at [address] N/A 2021    ENDOSCOPIC ULTRASOUND WITH ENDOSCOPIC MUCOSAL RESECTION with 3 clips placed performed by Xavier Bradshaw MD at [address] Not on file   Tobacco Use    Smoking status: Never Smoker    Smokeless tobacco: Never Used    Tobacco comment: quit when 29   Vaping Use    Vaping Use: Never used   Substance and Sexual Activity    Alcohol use: No    Drug use: No    Sexual activity: Not on file       Current Outpatient Medications   Medication Sig Dispense Refill    labetalol (NORMODYNE) 100 MG tablet TAKE ONE TABLET BY MOUTH TWICE A  tablet 5    meloxicam (MOBIC) 15 MG tablet TAKE ONE TABLET BY MOUTH DAILY 15 tablet 1    methylPREDNISolone (MEDROL, ANNIA,) 4 MG tablet Take by mouth. 6 po day one 5 po day 2 4 po day 3 3 po day 4 2 po day 5 1 po day 6. 1 kit 0    predniSONE (DELTASONE) 1 MG tablet Take 1 mg by mouth daily Dose pack       cyclobenzaprine (FLEXERIL) 5 MG tablet Take 5 mg by mouth 3 times daily as needed for Muscle spasms       simvastatin (ZOCOR) 20 MG tablet Take 20 mg by mouth nightly      labetalol (NORMODYNE) 200 MG tablet Take 1 tablet by mouth 2 times daily 60 tablet 5    hydroCHLOROthiazide (HYDRODIURIL) 25 MG tablet Take 1 tablet by mouth daily 90 tablet 5    famotidine (PEPCID) 20 MG tablet Take 1 tablet by mouth 2 times daily as needed (heartburn) 60 tablet 3    omeprazole (PRILOSEC) 40 MG delayed release capsule Take 1 capsule by mouth every morning (before breakfast) 30 capsule 5    RESTASIS 0.05 % ophthalmic emulsion Place 1 drop into both eyes every 12 hours       Omega-3 Fatty Acids (FISH OIL CONCENTRATE PO) Take by mouth       fexofenadine (ALLEGRA) 180 MG tablet Take 180 mg by mouth daily as needed       fluticasone (FLONASE) 50 MCG/ACT nasal spray 1 spray by Each Nostril route daily as needed       acetaminophen (APAP EXTRA STRENGTH) 500 MG tablet Take 1 tablet by mouth every 6 hours as needed for Pain 40 tablet 3     No current facility-administered medications for this visit. Objective:     She is alert, oriented x 3, pleasant, well nourished, developed and in no   acute distress.      Resp 16   Ht 5' 5\" (1.651 m)   Wt 192 lb (87.1 kg)   BMI 31.95 kg/m²      Cervical Spine Exam:  There is not deformity. There is  loss of motion. There is  muscular spasm. There is  trapezius/ rhomboid tenderness. There is not spinous process tenderness. Spurling Test is Positive.     Upper extremities:  She has 5/5 strength of her interosseous muscles, wrist dorsiflexors and volarflexors, biceps, triceps, deltoids, and internal and external rotators of her shoulders, bilaterally. Her biceps, triceps, bracheoradialis, quadriceps and achilles reflexes are 2+, bilaterally. Sensation is intact to light touchfrom C6 to C8.   She has no clonus and negative Tena's bilaterally.      Examination of the upper extremities shows intact perfusion to all extremities. She has no cyanosis and digigts are warm to touch, capillary refill is less than 2 seconds. She has no edema noted.      She has intact skin without lacerations or abrasions, no significant erythema, rashes or skin lesions.          Examination of the right shoulder: There is no deformity. There is no erythema. There is no  soft tissue swelling. Deltoid region is  tender to palpation. AC Joint is not tender to palpation. Clavicle is not tender to palpation. Bicipital Groove is not  tender to palpation. Pectoralis  is not tender to palpation. Scapula/ trapezius is not tender to palpation. There is no weakness with supraspinatus testing. There is moderate pain with supraspinatus testing. Yergason Test negative. Drop Arm Test negative. Apprehension Test negative. Cross Arm Test negative. Shoulder Active ROM -    FF 17   IR to L5 ER 80         X Rays: not performed in the office today:     MRI: Obtained from [facility] or an outside facility.   Narrative   EXAMINATION:   MRI OF THE CERVICAL SPINE WITHOUT CONTRAST 9/10/2021 10:49 am       TECHNIQUE:   Multiplanar multisequence MRI of the cervical spine was performed without the   administration of intravenous contrast.     COMPARISON:   None.       HISTORY:   ORDERING SYSTEM PROVIDED HISTORY: DDD (degenerative disc disease), cervical   TECHNOLOGIST PROVIDED HISTORY:   Reason for Exam: Tingling in right arm since 6-   Acuity: Chronic   Relevant Medical/Surgical History: Malignant carcinoid tumor of the duodenum       FINDINGS:   Motion degrades images limiting evaluation.       BONES/ALIGNMENT: The vertebral body heights appear maintained.  Straightening   of the normal cervical lordosis.  Minimal retrolisthesis at C4-C5.  No   spondylolisthesis at the remaining levels.  Loss of disc space height is seen   at C4-C5 and C5-C6.  The bone marrow signal demonstrates no acute abnormality.       SPINAL CORD: No abnormal cord signal is seen.       SOFT TISSUES: No paraspinal mass identified.       C2-C3: There is no significant disc bulge, spinal canal stenosis or neural   foraminal narrowing.       C3-C4: There is a disc bulge contacting the ventral thecal sac.  No   significant spinal canal stenosis or neural foraminal narrowing.       C4-C5: There is a posterior disc osteophyte complex contributing to   mild-to-moderate spinal canal stenosis.  Uncovertebral joint and facet   arthrosis contributes to moderate right and mild-to-moderate left neural   foraminal narrowing.       C5-C6: There is a disc bulge with buckling of the ligamentum flavum   contributing to mild spinal canal stenosis.  Uncovertebral joint and facet   arthrosis contribute to moderate bilateral neural foraminal narrowing.       C6-C7: There is a posterior disc osteophyte complex with buckling of the   ligamentum flavum.  Mild-to-moderate spinal canal stenosis.  Uncovertebral   joint and facet arthrosis contributes to moderate bilateral neural foraminal   narrowing.       C7-T1: No significant disc bulge or spinal canal stenosis.  Uncovertebral   joint and facet arthrosis contribute to mild left neural foraminal narrowing.    No significant right neural foraminal narrowing.           Impression   1. Patient motion partially limits evaluation. 2. Mild-to-moderate spinal canal stenosis at C4-C5 and C6-C7 with mild   stenosis at C5-C6. 3. Multilevel neural foraminal narrowing as above. 4. Straightening of the normal cervical lordosis with minimal retrolisthesis   at C4-C5.             Diagnosis:       ICD-10-CM    1. Cervical stenosis of spine  M48.02 AFL - Christine Singh MD, Pain Management, [facility]        Assessment and Plan:       Assessment:  Cervical stenosis mild to moderate C4-5, C6-7 and mild C5-6. I had an extensive discussion with Ms. Omkar Roberts regarding the natural history, etiology, and long term consequences of her condition. I have presented reasonable alternatives to the patient's proposed care, treatment, and services. Risks and benefits of the treatment options also reviewed in detail. I have outlined a treatment plan with them. She has had full opportunity to ask her questions. I have answered them all to her satisfaction. I feel that Ms. Omkar Roberts understands our discussion today. Plan:    Procedures-   At this time, I do not believe spinal surgery is indicated. She may benefit other therapeutic options such as epidural steroid injection, facet injection or other interventional procedures. For this reason, I am going to refer to Dr. Jennifer Doll for Interventional Pain Management for an evaluation and treatment. Follow up:   Call or return to clinic if these symptoms worsen or fail to improve as anticipated. Ashly Carrillo PA-C   Senior Physician Assistant   [facility]                                         Disclaimer: This note was generated with use of a verbal recognition program (DRAGON) and an attempt was made to check for errors. It is possible that there are still dictated errors within this office note.   If so, please bring any significant errors to my attention for an addendum. All efforts were made to ensure that this office note is accurate.

## 2021-09-16 ENCOUNTER — HOSPITAL ENCOUNTER (OUTPATIENT)
Dept: PHYSICAL THERAPY | Age: 71
Setting detail: THERAPIES SERIES
Discharge: HOME | End: 2021-09-16
Payer: MEDICARE

## 2021-09-16 PROCEDURE — 97110 THERAPEUTIC EXERCISES: CPT

## 2021-09-16 PROCEDURE — 97140 MANUAL THERAPY 1/> REGIONS: CPT

## 2021-09-16 PROCEDURE — 97530 THERAPEUTIC ACTIVITIES: CPT

## 2021-09-16 PROCEDURE — 97012 MECHANICAL TRACTION THERAPY: CPT

## 2021-09-16 NOTE — PROGRESS NOTES
[address] [facility] [address] [facility], [facility] [address]  Phone: [phone]   Fax:     [phone]     Physical Therapy Discharge Summary    Dear  Blas Hutchison,    We had the pleasure of treating the following patient for physical therapy services at [address] [place] [address]. A summary of our findings can be found in the discharge summary below. If you have any questions or concerns regarding these findings, please do not hesitate to contact me at the office phone number above. Thank you for the referral.     Physician Signature:________________________________Date:__________________  By signing above (or electronic signature), therapists plan is approved by physician      Overall Response to Treatment:   [x]Patient is responding well to treatment and improvement is noted with regards  to goals   []Patient should continue to improve in reasonable time if they continue HEP   []Patient has plateaued and is no longer responding to skilled PT intervention    []Patient is getting worse and would benefit from return to referring MD   []Patient unable to adhere to initial POC   []Other:     Date range of Visits: 21-21  Total Visits: 7       Date:  2021    Patient Name:  Citlaly Choudhury    :  1950  MRN: [id number]    Pertinent Medical History:  HTN.  OA, h/o cancer, Hearing aids- bilaterally    Medical/Treatment Diagnosis Information:  · Diagnosis: M50.30 (ICD-10-CM) - DDD (degenerative disc disease), cervical  · Treatment Diagnosis: Neck and right arm pain, limited mobility, UE weakness, difficulty with ADL's    Insurance/Certification information:   [employer] Medicare  Physician Information:  Referring Practitioner: CHARLY Blount  Plan of care signed (Y/N): Sent to Blas Hutchison on 21    Date of Patient follow up with Physician: 21     Progress Report: []  Yes  [x] No     Date Range for reporting period:  Beginnin2021  Endin21    Progress report due (10 Rx/or 30 days whichever is less): 1.57.35    Recertification due (POC duration/ or 90 days whichever is less):     Visit # Insurance/POC Allowable Auth Needed   7 8 []Yes    [x]No     Functional Outcomes Measure:    Date Assessed: at DC  Test:NDI  Score:3    Pain level: 0/10     SUBJECTIVE:  See eval  21: Pt reports no pain today, since she had an injection from [facility] yesterday into her R shoulder. She also felt better after the traction last session. 21: Pt reports no pain but states that she did have some tingling in her right arm yesterday, which is gone today. The injection made her feel better. 21: Pt reports that her tingling persists. She has no pain. She will have an MRI on Friday. 21: Pt reports that tingling persists in her Right UE, but she has no pain. 21: Pt reports that she had her MRI on Friday and will learn the results today after this apt. No pain , still the the tingling throughout entire Right arm.  21: Pt reports no pain  Just tingling in right UE. She will schedule ROXANN with Dr. Jennifer Herr. OBJECTIVE:    Observation:    Test measurements:  Cervical ROM: Ext: 25  Flexion: WNL  SB L: 38  SB R:  36  ROT L:  60  ROT R : 65    RESTRICTIONS/PRECAUTIONS:   Exercises/Interventions:   Therapeutic Ex (59784)  Min: Resistance/Repetitions Notes   Dorsal glideAble to do 10X supine    Active cervical spinal rotation 10X each direction    OH pulley 3 min    Thoracic rows Red bands x 15, blue band x 15 Issues blue band today.    Upper trap stretch 5 sec x 10 to left +HEP                  Manual Intervention (97060)  Min:     Cerv mobs/manip Sub occipital release , MET with rotation    Thoracic mobs/manip     CT manip     Rib mobilizations     STM Stretching of upper traps, right  UE stretch and ULTT         NMR re-education (35559)  Min:               Therapeutic Activity (70183)  Min:      9/16/21: Reviewed goals and NDI. Took measurements. Modalities  Min:       Int cervical traction x 20# x 15 min with MHP Well tolerated with towel roll under right UE, has some tingling          Other Therapeutic Activities:  Pt was educated on PT POC, Diagnosis, Prognosis, pathomechanics as well as frequency and duration of scheduling future physical therapy appointments. Time was also taken on this day to answer all patient questions and participation in PT. Reviewed appointment policy in detail with patient and patient verbalized understanding. Home Exercise Program:  Active cervical rotation to each side  Access Code: [email or web address]  URL: [email or web address]  Date: 09/09/2021  Prepared by: Connie Perez  Seated Upper Trapezius Stretch - 1 x daily - 7 x weekly - 3 sets - 10 reps  9/16/21: Reviewed HEP    Therapeutic Exercise and NMR EXR  [x] (19004) Provided verbal/tactile cueing for activities related to strengthening, flexibility, endurance, ROM  for improvements in cervical, postural, scapular, scapulothoracic and UE control with self care, reaching, carrying, lifting, house/yardwork, driving/computer work.    [] (24984) Provided verbal/tactile cueing for activities related to improving balance, coordination, kinesthetic sense, posture, motor skill, proprioception  to assist with cervical, scapular, scapulothoracic and UE control with self care, reaching, carrying, lifting, house/yardwork, driving/computer work. Therapeutic Activities:    [x] (44000 or 77872) Provided verbal/tactile cueing for activities related to improving balance, coordination, kinesthetic sense, posture, motor skill, proprioception and motor activation to allow for proper function of cervical, scapular, scapulothoracic and UE control with self care, carrying, lifting, driving/computer work.      Home Exercise Program:    [x] (25854) Reviewed/Progressed HEP activities related to strengthening, flexibility, endurance, ROM of cervical, scapular, scapulothoracic and UE control with self care, reaching, carrying, lifting, house/yardwork, driving/computer work  [] (95211) Reviewed/Progressed HEP activities related to improving balance, coordination, kinesthetic sense, posture, motor skill, proprioception of cervical, scapular, scapulothoracic and UE control with self care, reaching, carrying, lifting, house/yardwork, driving/computer work      Manual Treatments:  PROM / STM / Oscillations-Mobs:  G-I, II, III, IV (PA's, Inf., Post.)  [x] (97462) Provided manual therapy to mobilize soft tissue/joints of cervical/CT, scapular GHJ and UE for the purpose of decreasing headache, modulating pain, promoting relaxation,  increasing ROM, reducing/eliminating soft tissue swelling/inflammation/restriction, improving soft tissue extensibility and allowing for proper ROM for normal function with self care, reaching, carrying, lifting, house/yardwork, driving/computer work    If [facility] Please Indicate Time In/Out  CPT Code Time in Time out                                   Approval Dates:  CPT Code Units Approved Units Used  Date Updated:                     Charges:  Timed Code Treatment Minutes: 45   Total Treatment Minutes: 60     [] EVAL (LOW) 89919 (typically 20 minutes face-to-face)  [] EVAL (MOD) 85348 (typically 30 minutes face-to-face)  [] EVAL (HIGH) 44415 (typically 45 minutes face-to-face)  [] RE-EVAL     [x] SO(63706) x  1   [] Dry needle 1 or 2 Muscles (33507)  [] NMR (04891) x     [] Dry needle 3+ Muscles (19742)  [x] Manual (11696) x 1    [] Ultrasound (51782) x  [x] TA (01606) x  1   [x] Mech Traction (45700) 15 min   ES(attended) (88550)     [] ES (un) (82307):   [] Vasopump (52090) [] Ionto (51225)   [] Other:    GOALS: Patient stated goal:\"Take this pain away\"  []? Progressing: [x]? Met: []? Not Met: []? Adjusted     Therapist goals for Patient:   Short Term Goals:  To be achieved in: 2 weeks  1. Independent in HEP and progression per patient tolerance, in order to prevent re-injury. []? Progressing: [x]? Met: []? Not Met: []? Adjusted  2. Patient will have a decrease in pain to facilitate improvement in movement, function, and ADLs as indicated by Functional Deficits. []? Progressing: [x]? Met: []? Not Met: []? Adjusted     Long Term Goals: To be achieved in: 4 weeks or by DC  1. Disability index score of 3 % or less for the NDI to assist with reaching prior level of function. []? Progressing: [x]? Met: []? Not Met: []? Adjusted  2. Patient will demonstrate increased AROM to [facility] of cervical/thoracic spine to allow for proper joint functioning as indicated by patients Functional Deficits. []? Progressing: [x]? Met: []? Not Met: []? Adjusted  3. Patient will demonstrate an increase in postural awareness and control and activation of  Deep cervical stabilizers to allow for proper functional mobility as indicated by patients Functional Deficits. []? Progressing: [x]? Met: []? Not Met: []? Adjusted  4. Patient will return to 97% functional activities without increased symptoms or restriction. []? Progressing: [x]? Met: []? Not Met: []? Adjusted  5. Pt will be free from neck pain and right UE paresthesia and will be able to return to walking for exercise  (patient specific functional goal)    [x]? Progressing: []? Met: []? Not Met: []? Adjusted    (UE paresthesia is still present)         ASSESSMENT:  Patient presents with signs and symptoms consistent with DDD of cervical region, and has radiation into right UE. Patient notes no pain any longer, but does have persistent paresthesia in the right UE. She will see Dr. Buster Miller soon. She has had an MRI, which indicates significant pathology (disc bulging and narrowing of the neural foramina in the the cervical spine.) She has met most PT goals except for the reduction of paresthesia in her Right arm.     Treatment/Activity Tolerance:  [x] Patient tolerated treatment well [] Patient limited by fatique  [] Patient limited by pain  [] Patient limited by other medical complications  [] Other:     Overall Progression Towards Functional goals/ Treatment Progress Update:  [x] Patient is progressing as expected towards functional goals listed. [] Progression is slowed due to complexities/Impairments listed. [] Progression has been slowed due to co-morbidities. [] Plan just implemented, too soon to assess goals progression <30days   [] Goals require adjustment due to lack of progress  [] Patient is not progressing as expected and requires additional follow up with physician  [] Other    Prognosis for POC: [x] Good [] Fair  [] Poor    Patient requires continued skilled intervention: [x] Yes  [] No        PLAN: DC from PT. Follow up with Dr. Richard Garcia as advised by CHARLY Horn. .  [x] Continue per plan of care [] Alter current plan (see comments)  [] Plan of care initiated [] Hold pending MD visit [] Discharge    Electronically signed by: Jered Cummins, PT    Note: If patient does not return for scheduled/recommended follow up visits, this note will serve as a discharge from care along with the most recent update on progress.

## 2021-09-21 ENCOUNTER — HOSPITAL ENCOUNTER (EMERGENCY)
Age: 71
Discharge: HOME | End: 2021-09-21
Attending: EMERGENCY MEDICINE
Payer: MEDICARE

## 2021-09-21 ENCOUNTER — APPOINTMENT (OUTPATIENT)
Dept: GENERAL RADIOLOGY | Age: 71
End: 2021-09-21
Payer: MEDICARE

## 2021-09-21 VITALS
DIASTOLIC BLOOD PRESSURE: 78 MMHG | SYSTOLIC BLOOD PRESSURE: 166 MMHG | TEMPERATURE: 98.3 F | OXYGEN SATURATION: 100 % | BODY MASS INDEX: 31.95 KG/M2 | RESPIRATION RATE: 13 BRPM | WEIGHT: 192 LBS | HEART RATE: 62 BPM

## 2021-09-21 DIAGNOSIS — R07.89 RIGHT-SIDED CHEST WALL PAIN: Primary | ICD-10-CM

## 2021-09-21 LAB
ANION GAP SERPL CALCULATED.3IONS-SCNC: 15 MMOL/L (ref 3–16)
BASOPHILS ABSOLUTE: 0 K/UL (ref 0–0.2)
BASOPHILS RELATIVE PERCENT: 0.5 %
BUN BLDV-MCNC: 12 MG/DL (ref 7–20)
CALCIUM SERPL-MCNC: 10.1 MG/DL (ref 8.3–10.6)
CHLORIDE BLD-SCNC: 101 MMOL/L (ref 99–110)
CO2: 25 MMOL/L (ref 21–32)
CREAT SERPL-MCNC: 0.9 MG/DL (ref 0.6–1.2)
EOSINOPHILS ABSOLUTE: 0.1 K/UL (ref 0–0.6)
EOSINOPHILS RELATIVE PERCENT: 1.3 %
GFR AFRICAN AMERICAN: >60
GFR NON-AFRICAN AMERICAN: >60
GLUCOSE BLD-MCNC: 104 MG/DL (ref 70–99)
HCG QUALITATIVE: NEGATIVE
HCT VFR BLD CALC: 36.1 % (ref 36–48)
HEMOGLOBIN: 11.9 G/DL (ref 12–16)
LYMPHOCYTES ABSOLUTE: 3.7 K/UL (ref 1–5.1)
LYMPHOCYTES RELATIVE PERCENT: 47.5 %
MAGNESIUM: 2 MG/DL (ref 1.8–2.4)
MCH RBC QN AUTO: 30.4 PG (ref 26–34)
MCHC RBC AUTO-ENTMCNC: 33.1 G/DL (ref 31–36)
MCV RBC AUTO: 91.9 FL (ref 80–100)
MONOCYTES ABSOLUTE: 0.7 K/UL (ref 0–1.3)
MONOCYTES RELATIVE PERCENT: 8.5 %
NEUTROPHILS ABSOLUTE: 3.3 K/UL (ref 1.7–7.7)
NEUTROPHILS RELATIVE PERCENT: 42.2 %
PDW BLD-RTO: 14.2 % (ref 12.4–15.4)
PLATELET # BLD: 336 K/UL (ref 135–450)
PMV BLD AUTO: 6.6 FL (ref 5–10.5)
POTASSIUM REFLEX MAGNESIUM: 3.4 MMOL/L (ref 3.5–5.1)
RBC # BLD: 3.93 M/UL (ref 4–5.2)
SODIUM BLD-SCNC: 141 MMOL/L (ref 136–145)
TROPONIN: <0.01 NG/ML
WBC # BLD: 7.9 K/UL (ref 4–11)

## 2021-09-21 PROCEDURE — 99285 EMERGENCY DEPT VISIT HI MDM: CPT

## 2021-09-21 PROCEDURE — 71046 X-RAY EXAM CHEST 2 VIEWS: CPT

## 2021-09-21 PROCEDURE — 83735 ASSAY OF MAGNESIUM: CPT

## 2021-09-21 PROCEDURE — 93005 ELECTROCARDIOGRAM TRACING: CPT | Performed by: EMERGENCY MEDICINE

## 2021-09-21 PROCEDURE — 84484 ASSAY OF TROPONIN QUANT: CPT

## 2021-09-21 PROCEDURE — 84703 CHORIONIC GONADOTROPIN ASSAY: CPT

## 2021-09-21 PROCEDURE — 80048 BASIC METABOLIC PNL TOTAL CA: CPT

## 2021-09-21 PROCEDURE — 85025 COMPLETE CBC W/AUTO DIFF WBC: CPT

## 2021-09-21 ASSESSMENT — PAIN - FUNCTIONAL ASSESSMENT
PAIN_FUNCTIONAL_ASSESSMENT: 0-10
PAIN_FUNCTIONAL_ASSESSMENT: PREVENTS OR INTERFERES SOME ACTIVE ACTIVITIES AND ADLS

## 2021-09-21 ASSESSMENT — PAIN SCALES - GENERAL
PAINLEVEL_OUTOF10: 0
PAINLEVEL_OUTOF10: 6
PAINLEVEL_OUTOF10: 0

## 2021-09-21 ASSESSMENT — PAIN DESCRIPTION - PAIN TYPE
TYPE: ACUTE PAIN
TYPE: ACUTE PAIN

## 2021-09-21 ASSESSMENT — PAIN DESCRIPTION - DESCRIPTORS
DESCRIPTORS: ACHING
DESCRIPTORS: SHOOTING

## 2021-09-21 ASSESSMENT — PAIN DESCRIPTION - PROGRESSION
CLINICAL_PROGRESSION: NOT CHANGED
CLINICAL_PROGRESSION: GRADUALLY IMPROVING

## 2021-09-21 ASSESSMENT — PAIN DESCRIPTION - ONSET
ONSET: SUDDEN
ONSET: ON-GOING

## 2021-09-21 ASSESSMENT — PAIN DESCRIPTION - LOCATION
LOCATION: CHEST
LOCATION: CHEST

## 2021-09-21 ASSESSMENT — HEART SCORE: ECG: 0

## 2021-09-21 ASSESSMENT — PAIN DESCRIPTION - FREQUENCY
FREQUENCY: CONTINUOUS
FREQUENCY: INTERMITTENT

## 2021-09-21 ASSESSMENT — PAIN DESCRIPTION - ORIENTATION: ORIENTATION: MID

## 2021-09-21 NOTE — ED NOTES
Discharge and education instructions reviewed. Patient verbalized understanding, teach-back successful. Patient denied questions at this time. No acute distress noted. Patient instructed to follow-up as noted - return to emergency department if symptoms worsen. Patient verbalized understanding. Discharged per EDMD with discharge instructions.         Eric Cerrato RN  09/21/21 3810

## 2021-09-21 NOTE — ED PROVIDER NOTES
History:   Procedure Laterality Date    ANKLE SURGERY      bone spur removed from achilles tendon  in  [place]      JOINT REPLACEMENT Left     TKR 2008    KNEE ARTHROSCOPY      both knee    KNEE SURGERY      knee replacement in the left knee    OTHER SURGICAL HISTORY  01/23/2017    excision of back mass    UPPER GASTROINTESTINAL ENDOSCOPY N/A 7/21/2021    EGD W/ EMR performed by Birdie Lau MD at [address] 7/21/2021    ENDOSCOPIC ULTRASOUND WITH ENDOSCOPIC MUCOSAL RESECTION with 3 clips placed performed by Birdie Lau MD at [address] [facility]       Previous Medications    ACETAMINOPHEN (APAP EXTRA STRENGTH) 500 MG TABLET    Take 1 tablet by mouth every 6 hours as needed for Pain    CYCLOBENZAPRINE (FLEXERIL) 5 MG TABLET    Take 5 mg by mouth 3 times daily as needed for Muscle spasms     FAMOTIDINE (PEPCID) 20 MG TABLET    Take 1 tablet by mouth 2 times daily as needed (heartburn)    FEXOFENADINE (ALLEGRA) 180 MG TABLET    Take 180 mg by mouth daily as needed     FLUTICASONE (FLONASE) 50 MCG/ACT NASAL SPRAY    1 spray by Each Nostril route daily as needed     HYDROCHLOROTHIAZIDE (HYDRODIURIL) 25 MG TABLET    Take 1 tablet by mouth daily    LABETALOL (NORMODYNE) 100 MG TABLET    TAKE ONE TABLET BY MOUTH TWICE A DAY    LABETALOL (NORMODYNE) 200 MG TABLET    Take 1 tablet by mouth 2 times daily    MELOXICAM (MOBIC) 15 MG TABLET    TAKE ONE TABLET BY MOUTH DAILY    OMEGA-3 FATTY ACIDS (FISH OIL CONCENTRATE PO)    Take by mouth     OMEPRAZOLE (PRILOSEC) 40 MG DELAYED RELEASE CAPSULE    Take 1 capsule by mouth every morning (before breakfast)    RESTASIS 0.05 % OPHTHALMIC EMULSION    Place 1 drop into both eyes every 12 hours     SIMVASTATIN (ZOCOR) 20 MG TABLET    Take 20 mg by mouth nightly         ALLERGIES     Lisinopril and Tetracyclines & related    FAMILYHISTORY       Family History   Problem Relation Age of Onset    High Blood Pressure Mother     Arthritis Father           SOCIAL HISTORY       Social History     Tobacco Use    Smoking status: Never Smoker    Smokeless tobacco: Never Used    Tobacco comment: quit when 26   Vaping Use    Vaping Use: Never used   Substance Use Topics    Alcohol use: No    Drug use: No       SCREENINGS      Heart Score for chest pain patients  History: Slightly Suspicious  ECG: Normal  Patient Age: > 65 years  *Risk factors for Atherosclerotic disease: Hypercholesterolemia, Hypertension  Risk Factors: 1 or 2 risk factors  Troponin: < 1X normal limit  Heart Score Total: 3      PHYSICAL EXAM    (up to 7 for level 4, 8 or more for level 5)     ED Triage Vitals   BP Temp Temp Source Pulse Resp SpO2 Height Weight   09/21/21 1329 09/21/21 1329 09/21/21 1329 09/21/21 1329 09/21/21 1329 09/21/21 1329 -- 09/21/21 1707   (!) 171/86 98.3 °F (36.8 °C) Oral 73 18 99 %  192 lb (87.1 kg)       Physical Exam  Vitals and nursing note reviewed. Constitutional:       General: She is not in acute distress. Appearance: Normal appearance. She is not ill-appearing. HENT:      Head: Normocephalic and atraumatic. Nose: Nose normal.   Eyes:      General:         Right eye: No discharge. Left eye: No discharge. Cardiovascular:      Rate and Rhythm: Normal rate and regular rhythm. Heart sounds: Normal heart sounds. No murmur heard. No gallop. Pulmonary:      Effort: Pulmonary effort is normal. No respiratory distress. Breath sounds: Normal breath sounds. No stridor. No wheezing, rhonchi or rales. Musculoskeletal:         General: Tenderness (To palpation of the right chest wall over the pectoralis muscle, negative for skin changes) present. Normal range of motion. Cervical back: Normal range of motion. Skin:     General: Skin is warm and dry.    Neurological:      General: No focal deficit present. Mental Status: She is alert and oriented to person, place, and time. Psychiatric:         Mood and Affect: Mood normal.         Behavior: Behavior normal.         DIAGNOSTIC RESULTS   LABS:    Labs Reviewed   CBC WITH AUTO DIFFERENTIAL - Abnormal; Notable for the following components:       Result Value    RBC 3.93 (*)     Hemoglobin 11.9 (*)     All other components within normal limits    Narrative:     Performed at:  [address] [facility] [place] [address]   Phone [phone]   BASIC METABOLIC PANEL W/ REFLEX TO MG FOR LOW K - Abnormal; Notable for the following components:    Potassium reflex Magnesium 3.4 (*)     Glucose 104 (*)     All other components within normal limits    Narrative:     Performed at:  [address] [place] [address]   Phone [phone]   HCG, SERUM, QUALITATIVE    Narrative:     Performed at:  [address] [place] [address]   Phone [phone]   TROPONIN    Narrative:     Performed at:  [facility]  [address] [place] [address] [facility] [address]   Phone [phone]   MAGNESIUM    Narrative:     Performed at:  [facility]  [address] [place] [address] [facility] [address]   Phone [phone]       When ordered only abnormal lab results are displayed. All other labs were within normal range or not returned as of this dictation. EKG: When ordered, EKG's are interpreted by the Emergency Department Physician in the absence of a cardiologist.  Please see their note for interpretation of EKG.     RADIOLOGY:   Non-plain film images such as CT, Ultrasound and MRI are read by the radiologist. Plain radiographic images are visualized and preliminarily interpreted by the ED Provider with the below findings:        Interpretation per the Radiologist below, if clinically significant/life threatening deterioration in this patient's condition, which required my urgent intervention. Total critical care time was 10 minutes. This excludes any time for separately reportable procedures. FINAL IMPRESSION      1. Right-sided chest wall pain          DISPOSITION/PLAN   DISPOSITION Decision To Discharge 09/21/2021 05:26:12 PM      PATIENT REFERRED TO:  Cristino Mondragon MD  [address]  [phone]    Call   As needed, For follow-up care      DISCHARGE MEDICATIONS:  New Prescriptions    No medications on file       DISCONTINUED MEDICATIONS:  Discontinued Medications    METHYLPREDNISOLONE (MEDROL, ANNIA,) 4 MG TABLET    Take by mouth. 6 po day one 5 po day 2 4 po day 3 3 po day 4 2 po day 5 1 po day 6.     PREDNISONE (DELTASONE) 1 MG TABLET    Take 1 mg by mouth daily Dose pack               (Please note that portions of this note were completed with a voice recognition program.  Efforts were made to edit the dictations but occasionally words are mis-transcribed.)    CHARLY Rizvi (electronically signed)             Graciela Rizvi  09/21/21 0565

## 2021-09-21 NOTE — ED PROVIDER NOTES
I independently evaluated and obtained a history and physical on Mohit Toneyo. All diagnostic, treatment, and disposition assistants were made to myself in conjunction the advanced practice provider. For further details of this patient's emergency department encounter, please see the advanced practice provider's documentation. History: Patient is a 48-year-old female with right-sided chest wall pain the last few seconds or so. Started yesterday patient was cleaning the bathroom. No numbness or tingling. And it is on the right arm occasionally no shortness of breath. No cardiac risk factor has history of some type of stomach cancer in the past.  Patient has a low cardiac score. Patient was seen in conjunction with MARGARITO. Appears to be in no distress. Physician Exam: Patient has normal vital signs pulse is 62    EKG was normal    Patient is alert awake pain is reproducible on the right chest wall. She is moving all extremities very pleasant.   Lungs were clear to auscultation labs were as follows    Labs Reviewed   CBC WITH AUTO DIFFERENTIAL - Abnormal; Notable for the following components:       Result Value    RBC 3.93 (*)     Hemoglobin 11.9 (*)     All other components within normal limits    Narrative:     Performed at:  [address] [place]   Phone [phone]   BASIC METABOLIC PANEL W/ REFLEX TO MG FOR LOW K - Abnormal; Notable for the following components:    Potassium reflex Magnesium 3.4 (*)     Glucose 104 (*)     All other components within normal limits    Narrative:     Performed at:  [address] [place] [address]   Phone [phone]   HCG, SERUM, QUALITATIVE    Narrative:     Performed at:  [address] [place] [address]   Phone [phone]   TROPONIN    Narrative:     Performed at:  [facility]  [address] [place] [address]   Phone [phone]   MAGNESIUM    Narrative:     Performed at:  [facility]  [address] [place] [address]   Phone [phone]     XR CHEST (2 VW)   Final Result   No acute cardiopulmonary disease. Patient appears in no distress. Normalized with reassurance pain is not cardiac related. Patient was asked to return if worse. Follow-up with family physician patient understands instructions.         Arely Nugent MD  09/21/21 1797

## 2021-09-23 LAB
EKG ATRIAL RATE: 80 BPM
EKG DIAGNOSIS: NORMAL
EKG P AXIS: 67 DEGREES
EKG P-R INTERVAL: 160 MS
EKG Q-T INTERVAL: 376 MS
EKG QRS DURATION: 112 MS
EKG QTC CALCULATION (BAZETT): 433 MS
EKG R AXIS: 21 DEGREES
EKG T AXIS: 71 DEGREES
EKG VENTRICULAR RATE: 80 BPM

## 2021-09-23 PROCEDURE — 93010 ELECTROCARDIOGRAM REPORT: CPT | Performed by: INTERNAL MEDICINE

## 2021-10-07 ENCOUNTER — NURSE ONLY (OUTPATIENT)
Dept: PRIMARY CARE CLINIC | Age: 71
End: 2021-10-07
Payer: MEDICARE

## 2021-10-07 DIAGNOSIS — Z23 INFLUENZA VACCINE NEEDED: ICD-10-CM

## 2021-10-07 DIAGNOSIS — Z23 NEEDS FLU SHOT: Primary | ICD-10-CM

## 2021-10-07 PROCEDURE — G0008 ADMIN INFLUENZA VIRUS VAC: HCPCS | Performed by: INTERNAL MEDICINE

## 2021-10-07 PROCEDURE — 90694 VACC AIIV4 NO PRSRV 0.5ML IM: CPT | Performed by: INTERNAL MEDICINE

## 2021-10-07 RX ORDER — OMEPRAZOLE 40 MG/1
CAPSULE, DELAYED RELEASE ORAL
Qty: 30 CAPSULE | Refills: 5 | Status: SHIPPED | OUTPATIENT
Start: 2021-10-07 | End: 2022-06-01

## 2021-10-07 NOTE — TELEPHONE ENCOUNTER
Medication:   Requested Prescriptions     Pending Prescriptions Disp Refills    omeprazole (PRILOSEC) 40 MG delayed release capsule [Pharmacy Med Name: OMEPRAZOLE DR 40 MG CAPSULE] 30 capsule 5     Sig: TAKE ONE CAPSULE BY MOUTH EVERY MORNING BEFORE BREAKFAST        Last Filled:      Patient Phone Number: [phone] (home)     Last appt: 8/13/2021   Next appt: Visit date not found    Last OARRS:   RX Monitoring 4/16/2019   Attestation The Prescription Monitoring Report for this patient was reviewed today.    Periodic Controlled Substance Monitoring -

## 2021-10-07 NOTE — PROGRESS NOTES
Patient responses:    Have you ever had a reaction to a flu vaccine? No  Do you have any current illness? No  Have you ever had [place] Syndrome? No  Do you have a serious allergy to any of the follow: Neomycin, Polymyxin, Thimerosal, eggs or egg products? No    Flu vaccine given per order. Please see immunization tab. Risks and benefits explained. Current VIS given.

## 2021-10-18 NOTE — PROGRESS NOTES
[facility] [address]  Phone: [phone]   Fax:     [phone]     Physical Therapy Discharge Summary    Dear Referring Practitioner: Adnrea Ochoa,    We had the pleasure of treating the following patient for physical therapy services at [address] [place] [address]. A summary of our findings can be found in the discharge summary below. If you have any questions or concerns regarding these findings, please do not hesitate to contact me at the office phone number above. Thank you for the referral.     Physician Signature:________________________________Date:__________________  By signing above (or electronic signature), therapists plan is approved by physician      Overall Response to Treatment:   [x]Patient is responding well to treatment and improvement is noted with regards  to goals   []Patient should continue to improve in reasonable time if they continue HEP   []Patient has plateaued and is no longer responding to skilled PT intervention    []Patient is getting worse and would benefit from return to referring MD   []Patient unable to adhere to initial POC   []Other:     Date range of Visits: 21-21  Total Visits: 7       Date:  10/18/2021    Patient Name:  Rae Espinal    :  1950  MRN: [id number]    Pertinent Medical History:  HTN.  OA, h/o cancer, Hearing aids- bilaterally    Medical/Treatment Diagnosis Information:  · Diagnosis: M50.30 (ICD-10-CM) - DDD (degenerative disc disease), cervical  · Treatment Diagnosis: Neck and right arm pain, limited mobility, UE weakness, difficulty with ADL's    Insurance/Certification information:   [employer] Medicare  Physician Information:  Referring Practitioner: CHARLY Ochoa  Plan of care signed (Y/N): Sent to Mikaela Horn on 21    Date of Patient follow up with Physician: 21 Progress Report: []  Yes  [x]  No     Date Range for reporting period:  Beginnin2021  Endin21    Progress report due (10 Rx/or 30 days whichever is less): 1.07.49    Recertification due (POC duration/ or 90 days whichever is less):     Visit # Insurance/POC Allowable Auth Needed   7 8 []Yes    [x]No     Functional Outcomes Measure:    Date Assessed: at DC  Test:NDI  Score:3    Pain level: 0/10     SUBJECTIVE:  See eval  21: Pt reports no pain today, since she had an injection from Nayeliorquidea Babni yesterday into her R shoulder. She also felt better after the traction last session. 21: Pt reports no pain but states that she did have some tingling in her right arm yesterday, which is gone today. The injection made her feel better. 21: Pt reports that her tingling persists. She has no pain. She will have an MRI on Friday. 21: Pt reports that tingling persists in her Right UE, but she has no pain. 21: Pt reports that she had her MRI on Friday and will learn the results today after this apt. No pain , still the the tingling throughout entire Right arm.  21: Pt reports no pain  Just tingling in right UE. She will schedule ROXANN with Dr. Melissa Wilcox. OBJECTIVE:    Observation:    Test measurements:  Cervical ROM: Ext: 25  Flexion: WNL  SB L: 38  SB R:  36  ROT L:  60  ROT R : 65    RESTRICTIONS/PRECAUTIONS:   Exercises/Interventions:   Therapeutic Ex (68794)  Min: Resistance/Repetitions Notes   Dorsal glideAble to do 10X supine    Active cervical spinal rotation 10X each direction    OH pulley 3 min    Thoracic rows Red bands x 15, blue band x 15 Issues blue band today.    Upper trap stretch 5 sec x 10 to left +HEP                  Manual Intervention (46225)  Min:     Cerv mobs/manip Sub occipital release , MET with rotation    Thoracic mobs/manip     CT manip     Rib mobilizations     STM Stretching of upper traps, right  UE stretch and ULTT         NMR re-education (99014)  Min:               Therapeutic Activity (43583)  Min:      9/16/21: Reviewed goals and NDI. Took measurements. Modalities  Min:       Int cervical traction x 20# x 15 min with MHP Well tolerated with towel roll under right UE, has some tingling          Other Therapeutic Activities:  Pt was educated on PT POC, Diagnosis, Prognosis, pathomechanics as well as frequency and duration of scheduling future physical therapy appointments. Time was also taken on this day to answer all patient questions and participation in PT. Reviewed appointment policy in detail with patient and patient verbalized understanding. Home Exercise Program:  Active cervical rotation to each side  Access Code: [email or web address]  URL: [email or web address]  Date: 09/09/2021  Prepared by: Abbie Garcia    Exercises  Seated Upper Trapezius Stretch - 1 x daily - 7 x weekly - 3 sets - 10 reps  9/16/21: Reviewed HEP    Therapeutic Exercise and NMR EXR  [x] (95451) Provided verbal/tactile cueing for activities related to strengthening, flexibility, endurance, ROM  for improvements in cervical, postural, scapular, scapulothoracic and UE control with self care, reaching, carrying, lifting, house/yardwork, driving/computer work.    [] (52111) Provided verbal/tactile cueing for activities related to improving balance, coordination, kinesthetic sense, posture, motor skill, proprioception  to assist with cervical, scapular, scapulothoracic and UE control with self care, reaching, carrying, lifting, house/yardwork, driving/computer work. Therapeutic Activities:    [x] (51599 or 37787) Provided verbal/tactile cueing for activities related to improving balance, coordination, kinesthetic sense, posture, motor skill, proprioception and motor activation to allow for proper function of cervical, scapular, scapulothoracic and UE control with self care, carrying, lifting, driving/computer work.      Home Exercise Program:    [x] (81782) Reviewed/Progressed HEP activities related to strengthening, flexibility, endurance, ROM of cervical, scapular, scapulothoracic and UE control with self care, reaching, carrying, lifting, house/yardwork, driving/computer work  [] (20893) Reviewed/Progressed HEP activities related to improving balance, coordination, kinesthetic sense, posture, motor skill, proprioception of cervical, scapular, scapulothoracic and UE control with self care, reaching, carrying, lifting, house/yardwork, driving/computer work      Manual Treatments:  PROM / STM / Oscillations-Mobs:  G-I, II, III, IV (PA's, Inf., Post.)  [x] (14338) Provided manual therapy to mobilize soft tissue/joints of cervical/CT, scapular GHJ and UE for the purpose of decreasing headache, modulating pain, promoting relaxation,  increasing ROM, reducing/eliminating soft tissue swelling/inflammation/restriction, improving soft tissue extensibility and allowing for proper ROM for normal function with self care, reaching, carrying, lifting, house/yardwork, driving/computer work    If Jeffrey Alberts Please Indicate Time In/Out  CPT Code Time in Time out                                   Approval Dates:  CPT Code Units Approved Units Used  Date Updated:                     Charges:  Timed Code Treatment Minutes: 45   Total Treatment Minutes: 60     [] EVAL (LOW) 65773 (typically 20 minutes face-to-face)  [] EVAL (MOD) 33588 (typically 30 minutes face-to-face)  [] EVAL (HIGH) 89180 (typically 45 minutes face-to-face)  [] RE-EVAL     [x] DS(07568) x  1   [] Dry needle 1 or 2 Muscles (71575)  [] NMR (61869) x     [] Dry needle 3+ Muscles (61886)  [x] Manual (28549) x 1    [] Ultrasound (31501) x  [x] TA (51613) x  1   [x] Mech Traction (87395) 15 min   ES(attended) (19334)     [] ES (un) (14176):   [] Vasopump (20694) [] Ionto (02713)   [] Other:    GOALS: Patient stated goal:\"Take this pain away\"  []? Progressing: [x]? Met: []? Not Met: []?  Adjusted     Therapist goals for Patient: Short Term Goals: To be achieved in: 2 weeks  1. Independent in HEP and progression per patient tolerance, in order to prevent re-injury. []? Progressing: [x]? Met: []? Not Met: []? Adjusted  2. Patient will have a decrease in pain to facilitate improvement in movement, function, and ADLs as indicated by Functional Deficits. []? Progressing: [x]? Met: []? Not Met: []? Adjusted     Long Term Goals: To be achieved in: 4 weeks or by DC  1. Disability index score of 3 % or less for the NDI to assist with reaching prior level of function. []? Progressing: [x]? Met: []? Not Met: []? Adjusted  2. Patient will demonstrate increased AROM to [facility] of cervical/thoracic spine to allow for proper joint functioning as indicated by patients Functional Deficits. []? Progressing: [x]? Met: []? Not Met: []? Adjusted  3. Patient will demonstrate an increase in postural awareness and control and activation of  Deep cervical stabilizers to allow for proper functional mobility as indicated by patients Functional Deficits. []? Progressing: [x]? Met: []? Not Met: []? Adjusted  4. Patient will return to 97% functional activities without increased symptoms or restriction. []? Progressing: [x]? Met: []? Not Met: []? Adjusted  5. Pt will be free from neck pain and right UE paresthesia and will be able to return to walking for exercise  (patient specific functional goal)    [x]? Progressing: []? Met: []? Not Met: []? Adjusted    (UE paresthesia is still present)         ASSESSMENT:  Patient presents with signs and symptoms consistent with DDD of cervical region, and has radiation into right UE. Patient notes no pain any longer, but does have persistent paresthesia in the right UE. She will see Dr. Piotr Denney soon.  She has had an MRI, which indicates significant pathology (disc bulging and narrowing of the neural foramina in the the cervical spine.) She has met most PT goals except for the reduction of paresthesia in her Right arm.    Treatment/Activity Tolerance:  [x] Patient tolerated treatment well [] Patient limited by fatique  [] Patient limited by pain  [] Patient limited by other medical complications  [] Other:     Overall Progression Towards Functional goals/ Treatment Progress Update:  [x] Patient is progressing as expected towards functional goals listed. [] Progression is slowed due to complexities/Impairments listed. [] Progression has been slowed due to co-morbidities. [] Plan just implemented, too soon to assess goals progression <30days   [] Goals require adjustment due to lack of progress  [] Patient is not progressing as expected and requires additional follow up with physician  [] Other    Prognosis for POC: [x] Good [] Fair  [] Poor    Patient requires continued skilled intervention: [x] Yes  [] No        PLAN: DC from PT. Follow up with Dr. Timothy Alford as advised by CHARLY Boyle. .  [] Continue per plan of care [] Alter current plan (see comments)  [] Plan of care initiated [] Hold pending MD visit [x] Discharge    Electronically signed by: Trent Daugherty, PT    Note: If patient does not return for scheduled/recommended follow up visits, this note will serve as a discharge from care along with the most recent update on progress.

## 2021-10-29 ENCOUNTER — APPOINTMENT (OUTPATIENT)
Dept: INTERVENTIONAL RADIOLOGY/VASCULAR | Age: 71
End: 2021-10-29
Attending: ANESTHESIOLOGY
Payer: MEDICARE

## 2021-10-29 ENCOUNTER — HOSPITAL ENCOUNTER (OUTPATIENT)
Age: 71
Setting detail: OUTPATIENT SURGERY
Discharge: HOME | End: 2021-10-29
Attending: ANESTHESIOLOGY | Admitting: ANESTHESIOLOGY
Payer: MEDICARE

## 2021-10-29 VITALS
HEIGHT: 65 IN | DIASTOLIC BLOOD PRESSURE: 65 MMHG | SYSTOLIC BLOOD PRESSURE: 143 MMHG | WEIGHT: 202.4 LBS | HEART RATE: 61 BPM | BODY MASS INDEX: 33.72 KG/M2 | OXYGEN SATURATION: 99 % | TEMPERATURE: 96.8 F | RESPIRATION RATE: 16 BRPM

## 2021-10-29 PROCEDURE — 3610000054 HC PAIN LEVEL 3 BASE (NON-OR): Performed by: ANESTHESIOLOGY

## 2021-10-29 PROCEDURE — 6360000002 HC RX W HCPCS: Performed by: ANESTHESIOLOGY

## 2021-10-29 PROCEDURE — 2709999900 HC NON-CHARGEABLE SUPPLY: Performed by: ANESTHESIOLOGY

## 2021-10-29 PROCEDURE — 6360000004 HC RX CONTRAST MEDICATION: Performed by: ANESTHESIOLOGY

## 2021-10-29 RX ORDER — METHYLPREDNISOLONE ACETATE 80 MG/ML
INJECTION, SUSPENSION INTRA-ARTICULAR; INTRALESIONAL; INTRAMUSCULAR; SOFT TISSUE
Status: COMPLETED | OUTPATIENT
Start: 2021-10-29 | End: 2021-10-29

## 2021-10-29 ASSESSMENT — PAIN - FUNCTIONAL ASSESSMENT: PAIN_FUNCTIONAL_ASSESSMENT: 0-10

## 2021-10-29 ASSESSMENT — PAIN SCALES - GENERAL
PAINLEVEL_OUTOF10: 0
PAINLEVEL_OUTOF10: 0

## 2021-10-29 NOTE — H&P
Patient:  Miesha Palomino  YOB: 1950  Medical Record #:  [id number]   Place: [address] [facility]  Date:  10/29/2021   Physician:  Nick Martin MD    History Obtained From: electronic medical record    HISTORY OF PRESENT ILLNESS    Past Medical History:        Diagnosis Date    Acid reflux     Headache(784.0)     Hyperlipidemia     Hypertension     Osteoarthritis      Past Surgical History:        Procedure Laterality Date    ANKLE SURGERY      bone spur removed from achilles tendon  in      APPENDECTOMY       SECTION      COLONOSCOPY      HYSTERECTOMY      JOINT REPLACEMENT Left     TKR     KNEE ARTHROSCOPY      both knee    KNEE SURGERY      knee replacement in the left knee    OTHER SURGICAL HISTORY  2017    excision of back mass    UPPER GASTROINTESTINAL ENDOSCOPY N/A 2021    EGD W/ EMR performed by Khushboo Ivey MD at [address] [place] [address] 2021    ENDOSCOPIC ULTRASOUND WITH ENDOSCOPIC MUCOSAL RESECTION with 3 clips placed performed by Khushboo Ivey MD at [address] [place] [address]     Medications Prior to Admission:   No current facility-administered medications on file prior to encounter.      Current Outpatient Medications on File Prior to Encounter   Medication Sig Dispense Refill    omeprazole (PRILOSEC) 40 MG delayed release capsule TAKE ONE CAPSULE BY MOUTH EVERY MORNING BEFORE BREAKFAST 30 capsule 5    labetalol (NORMODYNE) 100 MG tablet TAKE ONE TABLET BY MOUTH TWICE A  tablet 5    meloxicam (MOBIC) 15 MG tablet TAKE ONE TABLET BY MOUTH DAILY 15 tablet 1    cyclobenzaprine (FLEXERIL) 5 MG tablet Take 5 mg by mouth 3 times daily as needed for Muscle spasms       simvastatin (ZOCOR) 20 MG tablet Take 20 mg by mouth nightly      labetalol (NORMODYNE) 200 MG tablet Take 1 tablet by mouth 2 times daily 60 tablet 5    hydroCHLOROthiazide (HYDRODIURIL) 25 MG tablet Take 1 tablet by mouth Marital Status:    Intimate Partner Violence:     Fear of Current or Ex-Partner:     Emotionally Abused:     Physically Abused:     Sexually Abused:      Family History   Problem Relation Age of Onset    High Blood Pressure Mother     Arthritis Father          PHYSICAL EXAM:      Ht 5' 5\" (1.651 m)   Wt 192 lb (87.1 kg)   BMI 31.95 kg/m²  I            ASSESSMENT AND PLAN:    1. Procedure. options, risks and benefits reviewed with patient and expresses understanding.

## 2021-10-29 NOTE — PROGRESS NOTES
Discussed potential for post injection weakness and numbness with patient. It was explained that due to the location of the injection numbness and/or weakness could occur in their lower extremities immediately following the injection and could last up to a couple of hours. Explained to patient they are not to stand following injection until a staff member is in the room with them to offer assistance in order for them to remain free from falls. Patient verbalized understanding, will continue to monitor patient as a high fall risk while in facility.
piercing's on the day of surgery. All jewelry must be removed. If you have dentures, they will be removed before going to operating room. For your convenience, we will provide you with a container. If you wear contact lenses or glasses, they will be removed, please bring a case for them. If you have a living will and a durable power of  for healthcare, please bring in a copy. As part of our patient safety program to minimize surgical site infections, we ask you to do the following:    · Please notify your surgeon if you develop any illness between         now and the  day of your surgery. · This includes a cough, cold, fever, sore throat, nausea,         or vomiting, and diarrhea, etc.  ·  Please notify your surgeon if you experience dizziness, shortness         of breath or blurred vision between now and the time of your surgery. Do not shave your operative site 96 hours prior to surgery. For face and neck surgery, men may use an electric razor 48 hours   prior to surgery. You may shower the night before surgery or the morning of   your surgery with an antibacterial soap. You will need to bring a photo ID and insurance card    [facility] has an onsite pharmacy, would you like to utilize our pharmacy     If you will be staying overnight and use a C-pap machine, please bring   your C-pap to hospital     Our goal is to provide you with excellent care, therefore, visitors will be limited to two(2) in the room at a time so that we may focus on providing this care for you. Please contact pre-admission testing if you have any further questions. [facility] phone number:  [phone]  Please note these are generalized instructions for all surgical cases, you may be provided with more specific instructions according to your surgery.

## 2021-10-29 NOTE — OP NOTE
Patient:  Anila Martinez  YOB: 1950  Medical Record #:  [id number]   Place: [address] [facility]  Date:  10/29/2021   Physician:  Marika Sellers MD    PRE-PROCEDURE DIAGNOSIS: M54.13    POST-PROCEDURE DIAGNOSIS: M54.13    PROCEDURE:  Midline interlaminar right C7-T1 epidural steroid injection with fluoroscopy and epidurography. BRIEF HISTORY:  The patient presents today to [facility] for a scheduled cervical epidural steroid injection procedure. The patient was re-evaluated today and is clinically unchanged as compared to my previous evaluation. The patient is clinically stable to proceed with the procedure. PROCEDURE NOTE:  The procedure was again explained to the patient and the previously distributed pre-procedure literature was reviewed. The options, rationale, and benefits of the procedure including pain relief, functional improvement, and increased mobility, as well as the risks of the procedure including but not limited to infection, bleeding, paresthesia, pain, failure to relieve pain, increased pain, headache, allergic reaction, neurologic impairment, local anesthetic, toxicity, and side effects and the potential side effects of corticosteroids were discussed with the patient and informed written consent was obtained from the patient. The patient was positioned in the prone position on the fluoroscopy table. The skin overlying the cervical vertebrae was prepped using Chloraprep and draped in the usual sterile fashion. The C7-T1 intervertebral level was identified using intermittent AP fluoroscopy. The previously identified projection of overlying skin was anesthetized using 2 cc of buffered 1% lidocaine with a 27 gauge needle. A 3.5\" 22g Touhy needle was advanced through a small skin nick in the AP view towards the interlaminar and epidural space. The epidural space was easily identified using loss of resistance to saline.   No difficulty, paresthesia

## 2021-11-03 NOTE — PROGRESS NOTES
[address] [facility] time______11/5/21 1015______        Surgery time____1115________    Take the following medications with a sip of water:    Do not eat or drink anything after 12:00 midnight prior to your surgery. This includes water chewing gum, mints and ice chips. You may brush your teeth and gargle the morning of your surgery, but do not swallow the water     Please see your family doctor/pediatrician for a history and physical and/or concerning medications. Bring any test results/reports from your physicians office. If you are under the care of a heart doctor or specialist doctor, please be aware that you may be asked to them for clearance    You may be asked to stop blood thinners such as Coumadin, Plavix, Fragmin, Lovenox, etc., or any anti-inflammatories such as:  Aspirin, Ibuprofen, Advil, Naproxen prior to your surgery. We also ask that you stop any OTC medications such as fish oil, vitamin E, glucosamine, garlic, Multivitamins, COQ 10, etc.    We ask that you do not smoke 24 hours prior to surgery  We ask that you do not  drink any alcoholic beverages 24 hours prior to surgery     You must make arrangements for a responsible adult to take you home after your surgery. For your safety you will not be allowed to leave alone or drive yourself home. Your surgery will be cancelled if you do not have a ride home. Also for your safety, it is strongly suggested that someone stay with you the first 24 hours after your surgery. A parent or legal guardian must accompany a child scheduled for surgery and plan to stay at the hospital until the child is discharged. Please do not bring other children with you. For your comfort, please wear simple loose fitting clothing to the hospital.  Please do not bring valuables.     Do not wear any make-up or nail polish on your fingers or toes      For your safety, please do not wear any jewelry or body piercing's on the day of surgery. All jewelry must be removed. If you have dentures, they will be removed before going to operating room. For your convenience, we will provide you with a container. If you wear contact lenses or glasses, they will be removed, please bring a case for them. If you have a living will and a durable power of  for healthcare, please bring in a copy. As part of our patient safety program to minimize surgical site infections, we ask you to do the following:    · Please notify your surgeon if you develop any illness between         now and the  day of your surgery. · This includes a cough, cold, fever, sore throat, nausea,         or vomiting, and diarrhea, etc.  ·  Please notify your surgeon if you experience dizziness, shortness         of breath or blurred vision between now and the time of your surgery. Do not shave your operative site 96 hours prior to surgery. For face and neck surgery, men may use an electric razor 48 hours   prior to surgery. You may shower the night before surgery or the morning of   your surgery with an antibacterial soap. You will need to bring a photo ID and insurance card    [facility] has an onsite pharmacy, would you like to utilize our pharmacy     If you will be staying overnight and use a C-pap machine, please bring   your C-pap to hospital     Our goal is to provide you with excellent care, therefore, visitors will be limited to two(2) in the room at a time so that we may focus on providing this care for you. Please contact pre-admission testing if you have any further questions. [facility] phone number:  [phone]  Please note these are generalized instructions for all surgical cases, you may be provided with more specific instructions according to your surgery.

## 2021-11-05 ENCOUNTER — HOSPITAL ENCOUNTER (OUTPATIENT)
Age: 71
Setting detail: OUTPATIENT SURGERY
Discharge: HOME | End: 2021-11-05
Attending: ANESTHESIOLOGY | Admitting: ANESTHESIOLOGY
Payer: MEDICARE

## 2021-11-05 ENCOUNTER — APPOINTMENT (OUTPATIENT)
Dept: INTERVENTIONAL RADIOLOGY/VASCULAR | Age: 71
End: 2021-11-05
Attending: ANESTHESIOLOGY
Payer: MEDICARE

## 2021-11-05 VITALS
TEMPERATURE: 96.9 F | OXYGEN SATURATION: 100 % | HEIGHT: 65 IN | HEART RATE: 73 BPM | DIASTOLIC BLOOD PRESSURE: 92 MMHG | BODY MASS INDEX: 33.89 KG/M2 | RESPIRATION RATE: 14 BRPM | WEIGHT: 203.4 LBS | SYSTOLIC BLOOD PRESSURE: 166 MMHG

## 2021-11-05 PROCEDURE — 2500000003 HC RX 250 WO HCPCS: Performed by: ANESTHESIOLOGY

## 2021-11-05 PROCEDURE — 6360000002 HC RX W HCPCS: Performed by: ANESTHESIOLOGY

## 2021-11-05 PROCEDURE — 3610000054 HC PAIN LEVEL 3 BASE (NON-OR): Performed by: ANESTHESIOLOGY

## 2021-11-05 PROCEDURE — 2709999900 HC NON-CHARGEABLE SUPPLY: Performed by: ANESTHESIOLOGY

## 2021-11-05 RX ORDER — LIDOCAINE HYDROCHLORIDE 10 MG/ML
INJECTION, SOLUTION EPIDURAL; INFILTRATION; INTRACAUDAL; PERINEURAL
Status: COMPLETED | OUTPATIENT
Start: 2021-11-05 | End: 2021-11-05

## 2021-11-05 RX ORDER — BUPIVACAINE HYDROCHLORIDE 5 MG/ML
INJECTION, SOLUTION EPIDURAL; INTRACAUDAL
Status: COMPLETED | OUTPATIENT
Start: 2021-11-05 | End: 2021-11-05

## 2021-11-05 RX ORDER — METHYLPREDNISOLONE ACETATE 80 MG/ML
INJECTION, SUSPENSION INTRA-ARTICULAR; INTRALESIONAL; INTRAMUSCULAR; SOFT TISSUE
Status: COMPLETED | OUTPATIENT
Start: 2021-11-05 | End: 2021-11-05

## 2021-11-05 ASSESSMENT — PAIN SCALES - GENERAL
PAINLEVEL_OUTOF10: 0
PAINLEVEL_OUTOF10: 0

## 2021-11-05 ASSESSMENT — PAIN DESCRIPTION - DESCRIPTORS: DESCRIPTORS: ACHING;SQUEEZING

## 2021-11-05 ASSESSMENT — PAIN - FUNCTIONAL ASSESSMENT
PAIN_FUNCTIONAL_ASSESSMENT: PREVENTS OR INTERFERES SOME ACTIVE ACTIVITIES AND ADLS
PAIN_FUNCTIONAL_ASSESSMENT: 0-10

## 2021-11-05 NOTE — H&P
Patient:  Shawna Bentley  YOB: 1950  Medical Record #:  [id number]   Place: [address] [facility]  Date:  2021   Physician:  Sagar Nunn MD    History Obtained From: electronic medical record    HISTORY OF PRESENT ILLNESS    Past Medical History:        Diagnosis Date    Acid reflux     Headache(784.0)     Hyperlipidemia     Hypertension     Osteoarthritis      Past Surgical History:        Procedure Laterality Date    ANKLE SURGERY      bone spur removed from achilles tendon  in      APPENDECTOMY       SECTION      COLONOSCOPY      HYSTERECTOMY      JOINT REPLACEMENT Left     TKR     KNEE ARTHROSCOPY      both knee    KNEE SURGERY      knee replacement in the left knee    OTHER SURGICAL HISTORY  2017    excision of back mass    PAIN MANAGEMENT PROCEDURE Right 10/29/2021    CERVICAL EPIDURAL STEROID INJECTION - C7-T1 RIGHT performed by Liss Loera MD at [facility] 2021    EGD W/ EMR performed by Jordon Mcghee MD at [facility] 2021    ENDOSCOPIC ULTRASOUND WITH ENDOSCOPIC MUCOSAL RESECTION with 3 clips placed performed by Jordon Mcghee MD at [address] [facility]     Medications Prior to Admission:   No current facility-administered medications on file prior to encounter.      Current Outpatient Medications on File Prior to Encounter   Medication Sig Dispense Refill    omeprazole (PRILOSEC) 40 MG delayed release capsule TAKE ONE CAPSULE BY MOUTH EVERY MORNING BEFORE BREAKFAST 30 capsule 5    meloxicam (MOBIC) 15 MG tablet TAKE ONE TABLET BY MOUTH DAILY 15 tablet 1    cyclobenzaprine (FLEXERIL) 5 MG tablet Take 5 mg by mouth 3 times daily as needed for Muscle spasms       simvastatin (ZOCOR) 20 MG tablet Take 20 mg by mouth nightly      labetalol (NORMODYNE) 200 MG tablet Take 1 tablet by mouth 2 times daily 60 tablet 5    hydroCHLOROthiazide (HYDRODIURIL) 25 MG tablet Take 1 tablet by mouth daily 90 tablet 5    famotidine (PEPCID) 20 MG tablet Take 1 tablet by mouth 2 times daily as needed (heartburn) 60 tablet 3    RESTASIS 0.05 % ophthalmic emulsion Place 1 drop into both eyes every 12 hours       fexofenadine (ALLEGRA) 180 MG tablet Take 180 mg by mouth daily as needed       fluticasone (FLONASE) 50 MCG/ACT nasal spray 1 spray by Each Nostril route daily as needed       acetaminophen (APAP EXTRA STRENGTH) 500 MG tablet Take 1 tablet by mouth every 6 hours as needed for Pain 40 tablet 3     Allergies:  Lisinopril and Tetracyclines & related  Social History     Socioeconomic History    Marital status:      Spouse name: Not on file    Number of children: Not on file    Years of education: Not on file    Highest education level: Not on file   Occupational History    Not on file   Tobacco Use    Smoking status: Never Smoker    Smokeless tobacco: Never Used    Tobacco comment: quit when 29   Vaping Use    Vaping Use: Never used   Substance and Sexual Activity    Alcohol use: No    Drug use: No    Sexual activity: Not Currently   Other Topics Concern    Not on file   Social History Narrative    Not on file     Social Determinants of Health     Financial Resource Strain: Low Risk     Difficulty of Paying Living Expenses: Not hard at all   Food Insecurity: No Food Insecurity    Worried About Running Out of Food in the Last Year: Never true    Anderson of Food in the Last Year: Never true   Transportation Needs:     Lack of Transportation (Medical):      Lack of Transportation (Non-Medical):    Physical Activity:     Days of Exercise per Week:     Minutes of Exercise per Session:    Stress:     Feeling of Stress :    Social Connections:     Frequency of Communication with Friends and Family:     Frequency of Social Gatherings with Friends and Family:     Attends [employer] Services:     Active Member of Clubs or Organizations:     Attends Club or Organization Meetings:     Marital Status:    Intimate Partner Violence:     Fear of Current or Ex-Partner:     Emotionally Abused:     Physically Abused:     Sexually Abused:      Family History   Problem Relation Age of Onset    High Blood Pressure Mother     Arthritis Father          PHYSICAL EXAM:      Ht 5' 5\" (1.651 m)   Wt 202 lb (91.6 kg)   BMI 33.61 kg/m²  I            ASSESSMENT AND PLAN:    1. Procedure. options, risks and benefits reviewed with patient and expresses understanding.

## 2021-11-05 NOTE — OP NOTE
Patient:  Miesha Palomino  YOB: 1950  Medical Record #:  [id number]   Place: [address] [facility]  Date:  11/5/2021   Physician:  Nick Martin MD      SUPRASCAPULAR NERVE BLOCK  (57431 and [id number])    PRE-PROCEDURE DIAGNOSIS:   Right Shoulder Adhesive Capulitis (M75.01)    POST-PROCEDURE DIAGNOSIS:  Right Shoulder Adhesive Capulitis (M75.01)    PROCEDURE: Right suprascapular nerve block with fluoroscopy    BRIEF HISTORY:  The patient presents today to the [facility] for the above scheduled suprascapular nerve block procedure. The patient is clinically unchanged as compared to my previous evaluation. The patient is clinically stable to proceed with the above scheduled injection. The options, rationale and benefits were discussed as were the risks including but not limited to infection, bleeding, pain, failure to relieve pain and pneumothorax. Informed written consent was obtained. PROCEDURE NOTE:  Patient was placed in the prone position. The Right posterior shoulder area was prepped with Choraprep and draped in the usual sterile fashion. Using intermittent fluoroscopic guidance a 25 gauge 3 1/2 inch needle was placed in the supraspinatus fossa between the suprascapular notch and scapular spine until periosteum was contacted. Periosteum was contacted. Negative aspiration was demonstrated and 10 cc of therapeutic injectate was injected and the needle was removed. The therapeutic injectate contained 9 cc of  bupivacaine 0.25 % and 1 cc of Depo-Medrol 40 mg per cc. The injectate was given in small aliquots with negative aspiration between aliquots. The needle was removed. The area was cleansed. A Band-Aid was placed over the injection site. The patient tolerated the procedure well and had no difficulty. The patient was transported by wheelchair with accompaniment to the recovery area and was discharged following standard discharge criteria.     Estimated Blood Loss: 0ml      Plan:  Follow up in 4-6 weeks    Office: [phone]

## 2021-11-29 ENCOUNTER — OFFICE VISIT (OUTPATIENT)
Dept: PRIMARY CARE CLINIC | Age: 71
End: 2021-11-29
Payer: MEDICARE

## 2021-11-29 VITALS
TEMPERATURE: 97.4 F | HEIGHT: 65 IN | SYSTOLIC BLOOD PRESSURE: 144 MMHG | BODY MASS INDEX: 33.66 KG/M2 | HEART RATE: 71 BPM | DIASTOLIC BLOOD PRESSURE: 78 MMHG | WEIGHT: 202 LBS

## 2021-11-29 DIAGNOSIS — R25.2 MUSCLE CRAMPING: Primary | ICD-10-CM

## 2021-11-29 DIAGNOSIS — I10 ESSENTIAL HYPERTENSION: ICD-10-CM

## 2021-11-29 DIAGNOSIS — R25.2 MUSCLE CRAMPING: ICD-10-CM

## 2021-11-29 LAB
ALBUMIN SERPL-MCNC: 4.9 G/DL (ref 3.4–5)
ANION GAP SERPL CALCULATED.3IONS-SCNC: 18 MMOL/L (ref 3–16)
BUN BLDV-MCNC: 13 MG/DL (ref 7–20)
CALCIUM SERPL-MCNC: 10.1 MG/DL (ref 8.3–10.6)
CHLORIDE BLD-SCNC: 101 MMOL/L (ref 99–110)
CO2: 23 MMOL/L (ref 21–32)
CREAT SERPL-MCNC: 0.9 MG/DL (ref 0.6–1.2)
GFR AFRICAN AMERICAN: >60
GFR NON-AFRICAN AMERICAN: >60
GLUCOSE BLD-MCNC: 120 MG/DL (ref 70–99)
MAGNESIUM: 2.1 MG/DL (ref 1.8–2.4)
PHOSPHORUS: 3.2 MG/DL (ref 2.5–4.9)
POTASSIUM SERPL-SCNC: 4.1 MMOL/L (ref 3.5–5.1)
SODIUM BLD-SCNC: 142 MMOL/L (ref 136–145)

## 2021-11-29 PROCEDURE — 99213 OFFICE O/P EST LOW 20 MIN: CPT | Performed by: NURSE PRACTITIONER

## 2021-11-29 RX ORDER — AMLODIPINE BESYLATE 5 MG/1
5 TABLET ORAL DAILY
Qty: 90 TABLET | Refills: 0 | Status: SHIPPED | OUTPATIENT
Start: 2021-11-29 | End: 2022-01-06 | Stop reason: SDUPTHER

## 2021-11-29 ASSESSMENT — ENCOUNTER SYMPTOMS
NAUSEA: 0
CONSTIPATION: 0
DIARRHEA: 0
SHORTNESS OF BREATH: 0
VOMITING: 0

## 2021-11-29 NOTE — PATIENT INSTRUCTIONS
Stop hydrochlorothiazide and start amlodipine for your blood pressure  Complete blood work  Follow up in one month, sooner if needed

## 2021-11-29 NOTE — PROGRESS NOTES
Subjective     CC:   Chief Complaint   Patient presents with    Other     hand and leg cramps       HPI    Ladarius Cruz is a 66year old female who presents to the office today for hand and leg cramps. Patient states left hand began cramping 1 month ago. She states this occurs if she holds something certain way - her  Fingers begin to cramp and \"become stuck. \"  Cramping relieved with rubbing fingers back to normal position, and patient takes a Tylenol to help with pain. Denies previous injury. States this occurs occasionally, less than 1 time per week, however, occurred yesterday. Denies numbness, tingling. Denies swelling to hands. Denies taking mobic or flexeril. Patient also experiences cramping in right ankle, toes, and in muscles up to her thigh. This has been occurring every other night for 2 months, and occurs once during the night. She tells me this awakens her out of sleep. Relief with rubbing muslces. Denies previous injury. She has a history of bilateral knee replacement. Denies numbness, tingling. Reports that her right ankle swells occasionally throughout the day, resolves upon awakening. She wears compression stockings daily. She has increased amount of water she has been drinking to try to help cramping, however this has not helped. She has decreased her physical activity/exercise since the summer due to sciatica. Taking HCTZ daily for hypertension. Noted potassium on 9/21/2021 was 3.4. Review of Systems   Constitutional: Negative for chills and fever. Respiratory: Negative for shortness of breath. Cardiovascular: Negative for chest pain and leg swelling. Gastrointestinal: Negative for constipation, diarrhea, nausea and vomiting. Genitourinary: Negative for difficulty urinating. Musculoskeletal: Positive for arthralgias, joint swelling and myalgias. Negative for neck pain.         Left ankle swelling - resolves with elevation   Neurological: Negative for dizziness and syncope. Objective   Vitals:    11/29/21 1020 11/29/21 1024   BP: (!) 158/80 (!) 144/78   Pulse: 71    Temp: 97.4 °F (36.3 °C)    TempSrc: Temporal    Weight: 202 lb (91.6 kg)    Height: 5' 5\" (1.651 m)      Body mass index is 33.61 kg/m². Wt Readings from Last 3 Encounters:   11/29/21 202 lb (91.6 kg)   11/05/21 203 lb 6.4 oz (92.3 kg)   10/29/21 202 lb 6.4 oz (91.8 kg)     BP Readings from Last 3 Encounters:   11/29/21 (!) 144/78   11/05/21 (!) 166/92   10/29/21 (!) 143/65      Physical Exam  Constitutional:       Appearance: Normal appearance. Cardiovascular:      Rate and Rhythm: Normal rate and regular rhythm. Pulses: Normal pulses. Heart sounds: Normal heart sounds. Pulmonary:      Effort: Pulmonary effort is normal.      Breath sounds: Normal breath sounds. Abdominal:      General: Bowel sounds are normal.      Palpations: Abdomen is soft. Musculoskeletal:      Right hand: No swelling, deformity or tenderness. Normal range of motion. Normal strength. Normal sensation. Left hand: No swelling, deformity or tenderness. Normal range of motion. Normal strength. Normal sensation. Left ankle: Swelling present. No tenderness. Skin:     General: Skin is warm and dry. Neurological:      Mental Status: She is alert and oriented to person, place, and time. Mental status is at baseline. Psychiatric:         Mood and Affect: Mood normal.         Behavior: Behavior normal.         Thought Content: Thought content normal.          Diagnosis Orders   1. Muscle cramping  RENAL FUNCTION PANEL    MAGNESIUM   2. Essential hypertension  amLODIPine (NORVASC) 5 MG tablet           Plan   1. Muscle cramping: potassium previously low 9/2021. Check electrolytes  - RENAL FUNCTION PANEL; Future  - MAGNESIUM; Future    2. Essential hypertension: controlled, however cramping may be attributed to HCTZ.   -Stop HCTZ, begin amlodipine 5mg daily  -Follow up with PCP in 1 month for hypertension  - amLODIPine (NORVASC) 5 MG tablet; Take 1 tablet by mouth daily  Dispense: 90 tablet; Refill: 0    Return in about 1 month (around 12/29/2021) for hypertension, cramping. OR sooner with questions, concerns, worsening symptoms      -Patient verbalized understanding and agreement to plan. Please note that this chart was generated using dragon dictation software. Although every effort was made to ensure the accuracy of this automated transcription, some errors in transcription may have occurred.

## 2021-12-02 ENCOUNTER — ANESTHESIA EVENT (OUTPATIENT)
Dept: ENDOSCOPY | Age: 71
End: 2021-12-02
Payer: MEDICARE

## 2021-12-02 RX ORDER — GABAPENTIN 100 MG/1
100 CAPSULE ORAL 3 TIMES DAILY
COMMUNITY

## 2021-12-02 NOTE — PROGRESS NOTES
[address] [facility] time_1000___________        Surgery time_1130___________    Take the following medications with a sip of water: Follow your MD/Surgeons pre-procedure instructions regarding your medications    Do not eat or drink anything after 12:00 midnight prior to your surgery. This includes water chewing gum, mints and ice chips. You may brush your teeth and gargle the morning of your surgery, but do not swallow the water     Please see your family doctor/pediatrician for a history and physical and/or concerning medications. Bring any test results/reports from your physicians office. If you are under the care of a heart doctor or specialist doctor, please be aware that you may be asked to them for clearance    You may be asked to stop blood thinners such as Coumadin, Plavix, Fragmin, Lovenox, etc., or any anti-inflammatories such as:  Aspirin, Ibuprofen, Advil, Naproxen prior to your surgery. We also ask that you stop any OTC medications such as fish oil, vitamin E, glucosamine, garlic, Multivitamins, COQ 10, etc.    We ask that you do not smoke 24 hours prior to surgery  We ask that you do not  drink any alcoholic beverages 24 hours prior to surgery     You must make arrangements for a responsible adult to take you home after your surgery. For your safety you will not be allowed to leave alone or drive yourself home. Your surgery will be cancelled if you do not have a ride home. SAFETY FIRST. .call before you fall  Also for your safety, it is strongly suggested that someone stay with you the first 24 hours after your surgery. A parent or legal guardian must accompany a child scheduled for surgery and plan to stay at the hospital until the child is discharged. Please do not bring other children with you. For your comfort, please wear simple loose fitting clothing to the hospital.  Please do not bring valuables.     Do not wear any make-up or nail polish on your fingers or toes      For your safety, please do not wear any jewelry or body piercing's on the day of surgery. All jewelry must be removed. If you have dentures, they will be removed before going to operating room. For your convenience, we will provide you with a container. If you wear contact lenses or glasses, they will be removed, please bring a case for them. If you have a living will and a durable power of  for healthcare, please bring in a copy. As part of our patient safety program to minimize surgical site infections, we ask you to do the following:    · Please notify your surgeon if you develop any illness between         now and the  day of your surgery. · This includes a cough, cold, fever, sore throat, nausea,         or vomiting, and diarrhea, etc.  ·  Please notify your surgeon if you experience dizziness, shortness         of breath or blurred vision between now and the time of your surgery. Do not shave your operative site 96 hours prior to surgery. For face and neck surgery, men may use an electric razor 48 hours   prior to surgery. You may shower the night before surgery or the morning of   your surgery with an antibacterial soap. You will need to bring a photo ID and insurance card    [facility] has an onsite pharmacy, would you like to utilize our pharmacy     If you will be staying overnight and use a C-pap machine, please bring   your C-pap to hospital     Our goal is to provide you with excellent care, therefore, visitors will be limited to two(2) in the room at a time so that we may focus on providing this care for you. Please contact pre-admission testing if you have any further questions.                  [facility] phone number:  [phone] PAT fax number:  [phone]  Please note these are generalized instructions for all surgical cases, you may be provided with more specific instructions according to your surgery.

## 2021-12-03 ENCOUNTER — ANESTHESIA (OUTPATIENT)
Dept: ENDOSCOPY | Age: 71
End: 2021-12-03
Payer: MEDICARE

## 2021-12-03 ENCOUNTER — HOSPITAL ENCOUNTER (OUTPATIENT)
Age: 71
Setting detail: OUTPATIENT SURGERY
Discharge: HOME | End: 2021-12-03
Attending: INTERNAL MEDICINE | Admitting: INTERNAL MEDICINE
Payer: MEDICARE

## 2021-12-03 VITALS
BODY MASS INDEX: 33.52 KG/M2 | SYSTOLIC BLOOD PRESSURE: 150 MMHG | WEIGHT: 201.17 LBS | TEMPERATURE: 97 F | RESPIRATION RATE: 16 BRPM | OXYGEN SATURATION: 98 % | DIASTOLIC BLOOD PRESSURE: 46 MMHG | HEIGHT: 65 IN | HEART RATE: 72 BPM

## 2021-12-03 VITALS
DIASTOLIC BLOOD PRESSURE: 67 MMHG | OXYGEN SATURATION: 100 % | SYSTOLIC BLOOD PRESSURE: 145 MMHG | RESPIRATION RATE: 20 BRPM

## 2021-12-03 DIAGNOSIS — D3A.8 NEUROENDOCRINE TUMOR: ICD-10-CM

## 2021-12-03 PROCEDURE — 7100000001 HC PACU RECOVERY - ADDTL 15 MIN: Performed by: INTERNAL MEDICINE

## 2021-12-03 PROCEDURE — 2709999900 HC NON-CHARGEABLE SUPPLY: Performed by: INTERNAL MEDICINE

## 2021-12-03 PROCEDURE — 6360000002 HC RX W HCPCS

## 2021-12-03 PROCEDURE — 88341 IMHCHEM/IMCYTCHM EA ADD ANTB: CPT

## 2021-12-03 PROCEDURE — 7100000000 HC PACU RECOVERY - FIRST 15 MIN: Performed by: INTERNAL MEDICINE

## 2021-12-03 PROCEDURE — 7100000010 HC PHASE II RECOVERY - FIRST 15 MIN: Performed by: INTERNAL MEDICINE

## 2021-12-03 PROCEDURE — 2500000003 HC RX 250 WO HCPCS

## 2021-12-03 PROCEDURE — 88305 TISSUE EXAM BY PATHOLOGIST: CPT

## 2021-12-03 PROCEDURE — 3700000000 HC ANESTHESIA ATTENDED CARE: Performed by: INTERNAL MEDICINE

## 2021-12-03 PROCEDURE — 88342 IMHCHEM/IMCYTCHM 1ST ANTB: CPT

## 2021-12-03 PROCEDURE — 3609012400 HC EGD TRANSORAL BIOPSY SINGLE/MULTIPLE: Performed by: INTERNAL MEDICINE

## 2021-12-03 PROCEDURE — 2580000003 HC RX 258: Performed by: ANESTHESIOLOGY

## 2021-12-03 PROCEDURE — 7100000011 HC PHASE II RECOVERY - ADDTL 15 MIN: Performed by: INTERNAL MEDICINE

## 2021-12-03 RX ORDER — SODIUM CHLORIDE 0.9 % (FLUSH) 0.9 %
10 SYRINGE (ML) INJECTION PRN
Status: DISCONTINUED | OUTPATIENT
Start: 2021-12-03 | End: 2021-12-03 | Stop reason: HOSPADM

## 2021-12-03 RX ORDER — SODIUM CHLORIDE 9 MG/ML
25 INJECTION, SOLUTION INTRAVENOUS PRN
Status: DISCONTINUED | OUTPATIENT
Start: 2021-12-03 | End: 2021-12-03 | Stop reason: HOSPADM

## 2021-12-03 RX ORDER — SODIUM CHLORIDE 0.9 % (FLUSH) 0.9 %
10 SYRINGE (ML) INJECTION EVERY 12 HOURS SCHEDULED
Status: DISCONTINUED | OUTPATIENT
Start: 2021-12-03 | End: 2021-12-03 | Stop reason: HOSPADM

## 2021-12-03 RX ORDER — PROPOFOL 10 MG/ML
INJECTION, EMULSION INTRAVENOUS PRN
Status: DISCONTINUED | OUTPATIENT
Start: 2021-12-03 | End: 2021-12-03 | Stop reason: SDUPTHER

## 2021-12-03 RX ORDER — PROPOFOL 10 MG/ML
INJECTION, EMULSION INTRAVENOUS CONTINUOUS PRN
Status: DISCONTINUED | OUTPATIENT
Start: 2021-12-03 | End: 2021-12-03 | Stop reason: SDUPTHER

## 2021-12-03 RX ORDER — LIDOCAINE HYDROCHLORIDE 20 MG/ML
INJECTION, SOLUTION EPIDURAL; INFILTRATION; INTRACAUDAL; PERINEURAL PRN
Status: DISCONTINUED | OUTPATIENT
Start: 2021-12-03 | End: 2021-12-03 | Stop reason: SDUPTHER

## 2021-12-03 RX ORDER — SODIUM CHLORIDE 9 MG/ML
INJECTION, SOLUTION INTRAVENOUS CONTINUOUS
Status: DISCONTINUED | OUTPATIENT
Start: 2021-12-03 | End: 2021-12-03 | Stop reason: HOSPADM

## 2021-12-03 RX ADMIN — PROPOFOL 150 MCG/KG/MIN: 10 INJECTION, EMULSION INTRAVENOUS at 11:46

## 2021-12-03 RX ADMIN — LIDOCAINE HYDROCHLORIDE 60 MG: 20 INJECTION, SOLUTION EPIDURAL; INFILTRATION; INTRACAUDAL; PERINEURAL at 11:46

## 2021-12-03 RX ADMIN — SODIUM CHLORIDE: 9 INJECTION, SOLUTION INTRAVENOUS at 10:57

## 2021-12-03 RX ADMIN — PROPOFOL 80 MG: 10 INJECTION, EMULSION INTRAVENOUS at 11:46

## 2021-12-03 ASSESSMENT — PULMONARY FUNCTION TESTS
PIF_VALUE: 1
PIF_VALUE: 0
PIF_VALUE: 1

## 2021-12-03 ASSESSMENT — PAIN SCALES - GENERAL
PAINLEVEL_OUTOF10: 0

## 2021-12-03 ASSESSMENT — PAIN - FUNCTIONAL ASSESSMENT: PAIN_FUNCTIONAL_ASSESSMENT: 0-10

## 2021-12-03 ASSESSMENT — LIFESTYLE VARIABLES: SMOKING_STATUS: 0

## 2021-12-03 NOTE — PROGRESS NOTES
Medications administered and monitored by CRNA, see anesthesia record.     Electronically signed by Richie Neves RN on 12/3/2021 at 11:43 AM

## 2021-12-03 NOTE — ANESTHESIA POSTPROCEDURE EVALUATION
Department of Anesthesiology  Postprocedure Note    Patient: Sunday Horn  MRN: [id number]  YOB: 1950  Date of evaluation: 12/3/2021  Time:  1:50 PM     Procedure Summary     Date: 12/03/21 Room / Location: [address] [place] [address]    Anesthesia Start: 4152 Anesthesia Stop: 1157    Procedure: EGD BIOPSY (N/A ) Diagnosis:       Neuroendocrine tumor      (NEUROENDOCRINE TUMOR)    Surgeons: Noemi Lyle MD Responsible Provider: Marshall Torres MD    Anesthesia Type: MAC ASA Status: 3          Anesthesia Type: MAC    James Phase I: James Score: 10    James Phase II: James Score: 10    Last vitals: Reviewed and per EMR flowsheets.        Anesthesia Post Evaluation    Patient location during evaluation: PACU  Patient participation: complete - patient participated  Level of consciousness: awake and alert  Pain score: 0  Airway patency: patent  Nausea & Vomiting: no nausea and no vomiting  Complications: no  Cardiovascular status: blood pressure returned to baseline  Respiratory status: acceptable  Hydration status: euvolemic

## 2021-12-03 NOTE — ANESTHESIA PRE PROCEDURE
[facility] Department of Anesthesiology  Pre-Anesthesia Evaluation/Consultation       Name:  Karl Aquino  : 1950  Age:  66 y.o.                                            MRN:  [id number]  Date: 12/3/2021           Surgeon: Surgeon(s):  Damon Alanis MD    Procedure: Procedure(s):  ESOPHAGOGASTRODUODENOSCOPY     Allergies   Allergen Reactions    Lisinopril Swelling and Angioedema    Tetracyclines & Related Itching and Rash     Bumps in mouth and inside of hands     Patient Active Problem List   Diagnosis    Lipoma of back    Mass on back    GERD (gastroesophageal reflux disease)    Hyperlipidemia    Hypertension    Status post total bilateral knee replacement using cement    Left hip pain    Needs flu shot    Vitamin D deficiency    Prediabetes    Tear of left gluteus medius tendon    DDD (degenerative disc disease), cervical    Pain of upper extremity    Cervical stenosis of spine     Past Medical History:   Diagnosis Date    Acid reflux     Headache(784.0)     Hyperlipidemia     Hypertension     Osteoarthritis      Past Surgical History:   Procedure Laterality Date    ANKLE SURGERY      bone spur removed from achilles tendon  in  [address]      X [address]    COLONOSCOPY      HYSTERECTOMY      KNEE ARTHROPLASTY Left 2017    KNEE ARTHROPLASTY Right 2008    KNEE ARTHROSCOPY Bilateral     NERVE BLOCK Right 2021    RIGHT SUPRASCAPULAR NERVE BLOCK performed by Laurent Wu MD at [address] [facility]  2017    excision of back mass    PAIN MANAGEMENT PROCEDURE Right 10/29/2021    CERVICAL EPIDURAL STEROID INJECTION - C7-T1 RIGHT performed by Laurent Wu MD at [address] [facility] 2021    EGD W/ EMR performed by Damon Alanis MD at [address] [facility] 2021    ENDOSCOPIC ULTRASOUND WITH ENDOSCOPIC MUCOSAL RESECTION with 3 clips placed performed by Deidre Zhou MD at [address] [place] [address] History     Tobacco Use    Smoking status: Never Smoker    Smokeless tobacco: Never Used    Tobacco comment: quit when 29   Vaping Use    Vaping Use: Never used   Substance Use Topics    Alcohol use: No    Drug use: No     Medications  Current Facility-Administered Medications on File Prior to Visit   Medication Dose Route Frequency Provider Last Rate Last Admin    0.9 % sodium chloride infusion   IntraVENous Continuous Lisandra Berrios MD        sodium chloride flush 0.9 % injection 10 mL  10 mL IntraVENous 2 times per day Lisandra Berrios MD        sodium chloride flush 0.9 % injection 10 mL  10 mL IntraVENous PRN Lisandra Berrios MD        0.9 % sodium chloride infusion  25 mL IntraVENous PRN Lisandra Berrios MD         Current Outpatient Medications on File Prior to Visit   Medication Sig Dispense Refill    gabapentin (NEURONTIN) 100 MG capsule Take 100 mg by mouth 3 times daily.       amLODIPine (NORVASC) 5 MG tablet Take 1 tablet by mouth daily 90 tablet 0    omeprazole (PRILOSEC) 40 MG delayed release capsule TAKE ONE CAPSULE BY MOUTH EVERY MORNING BEFORE BREAKFAST 30 capsule 5    cyclobenzaprine (FLEXERIL) 5 MG tablet Take 5 mg by mouth 3 times daily as needed for Muscle spasms       simvastatin (ZOCOR) 20 MG tablet Take 20 mg by mouth nightly      labetalol (NORMODYNE) 200 MG tablet Take 1 tablet by mouth 2 times daily 60 tablet 5    famotidine (PEPCID) 20 MG tablet Take 1 tablet by mouth 2 times daily as needed (heartburn) 60 tablet 3    RESTASIS 0.05 % ophthalmic emulsion Place 1 drop into both eyes every 12 hours       fexofenadine (ALLEGRA) 180 MG tablet Take 180 mg by mouth daily as needed       fluticasone (FLONASE) 50 MCG/ACT nasal spray 1 spray by Each Nostril route daily as needed       acetaminophen (APAP EXTRA STRENGTH) 500 MG tablet Take 1 tablet by mouth every 6 hours as needed for Pain 40 tablet 3     No current facility-administered medications for this visit. No current outpatient medications on file. Facility-Administered Medications Ordered in Other Visits   Medication Dose Route Frequency Provider Last Rate Last Admin    0.9 % sodium chloride infusion   IntraVENous Continuous Gerald Baeza MD        sodium chloride flush 0.9 % injection 10 mL  10 mL IntraVENous 2 times per day Gerald Baeza MD        sodium chloride flush 0.9 % injection 10 mL  10 mL IntraVENous PRN Gerald Baeza MD        0.9 % sodium chloride infusion  25 mL IntraVENous PRN Gerald Baeza MD         Vital Signs (Current)   There were no vitals filed for this visit. Vital Signs Statistics (for past 48 hrs)     Temp  Av.4 °F (36.3 °C)  Min: 97.4 °F (36.3 °C)   Min taken time: 21 100  Max: 97.4 °F (36.3 °C)   Max taken time: 21 1009  Pulse  Av  Min: 75   Min taken time: 21 1009  Max: 76   Max taken time: 21 1009  Resp  Av  Min: 16   Min taken time: 21 1009  Max: 16   Max taken time: 21 1009  BP  Min: 144/78   Min taken time: 21 1009  Max: 144/78   Max taken time: 21 1009  SpO2  Av %  Min: 100 %   Min taken time: 21 1009  Max: 100 %   Max taken time: 21 1009  BP Readings from Last 3 Encounters:   21 (!) 144/78   21 (!) 144/78   21 (!) 166/92       BMI  There is no height or weight on file to calculate BMI. Estimated body mass index is 33.48 kg/m² as calculated from the following:    Height as of an earlier encounter on 12/3/21: 5' 5\" (1.651 m). Weight as of an earlier encounter on 12/3/21: 201 lb 2.7 oz (91.3 kg).     CBC   Lab Results   Component Value Date    WBC 7.9 2021    RBC 3.93 2021    HGB 11.9 2021    HCT 36.1 2021    MCV 91.9 2021    RDW 14.2 2021     2021     CMP    Lab Results   Component Value Date     2021 K 4.1 11/29/2021    K 3.4 09/21/2021     11/29/2021    CO2 23 11/29/2021    BUN 13 11/29/2021    CREATININE 0.9 11/29/2021    GFRAA >60 11/29/2021    GFRAA >60 07/17/2012    AGRATIO 1.8 10/05/2020    LABGLOM >60 11/29/2021    GLUCOSE 120 11/29/2021    PROT 7.6 10/05/2020    PROT 7.4 07/17/2012    CALCIUM 10.1 11/29/2021    BILITOT <0.2 10/05/2020    ALKPHOS 115 10/05/2020    AST 24 10/05/2020    ALT 22 10/05/2020     BMP    Lab Results   Component Value Date     11/29/2021    K 4.1 11/29/2021    K 3.4 09/21/2021     11/29/2021    CO2 23 11/29/2021    BUN 13 11/29/2021    CREATININE 0.9 11/29/2021    CALCIUM 10.1 11/29/2021    GFRAA >60 11/29/2021    GFRAA >60 07/17/2012    LABGLOM >60 11/29/2021    GLUCOSE 120 11/29/2021     POCGlucose  No results for input(s): GLUCOSE in the last 72 hours.    Coags    Lab Results   Component Value Date    PROTIME 11.5 02/23/2017    INR 1.02 55/73/1599     HCG (If Applicable) No results found for: PREGTESTUR, PREGSERUM, HCG, HCGQUANT   ABGs No results found for: PHART, PO2ART, JHY6UOX, MDW4HBF, BEART, E2ICBLTF   Type & Screen (If Applicable)  No results found for: LABABO, LABRH                         BMI: Wt Readings from Last 3 Encounters:       NPO Status:  >8h                          Anesthesia Evaluation  Patient summary reviewed no history of anesthetic complications:   Airway: Mallampati: II  TM distance: >3 FB   Neck ROM: full  Mouth opening: > = 3 FB Dental: normal exam         Pulmonary: breath sounds clear to auscultation      (-) COPD, asthma, sleep apnea and not a current smoker                           Cardiovascular:    (+) hypertension:, hyperlipidemia    (-) past MI, CABG/stent and  angina        Rate: normal                    Neuro/Psych:   (+) headaches:,    (-) seizures, TIA and CVA           GI/Hepatic/Renal:   (+) GERD:,           Endo/Other:        (-) diabetes mellitus, hypothyroidism               Abdominal:             Vascular:     - DVT and PE. Other Findings:               Anesthesia Plan      MAC     ASA 3       Induction: intravenous. Anesthetic plan and risks discussed with patient. Plan discussed with CRNA. This pre-anesthesia assessment may be used as a history and physical.    DOS STAFF ADDENDUM:    Pt seen and examined, chart reviewed (including anesthesia, drug and allergy history). No interval changes to history and physical examination. Anesthetic plan, risks, benefits, alternatives, and personnel involved discussed with patient. Patient verbalized an understanding and agrees to proceed.       Judy Zayas MD  December 3, 2021  10:28 AM

## 2021-12-03 NOTE — H&P
Pre-operative History and Physical    Patient: Mariama Mahajan  : 1950  Acct#:     HISTORY OF PRESENT ILLNESS:    The patient is a 66 y.o. 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 Past Medical History:        Diagnosis Date    Acid reflux     Headache(784.0)     Hyperlipidemia     Hypertension     Osteoarthritis       Past Surgical History:        Procedure Laterality Date    ANKLE SURGERY      bone spur removed from achilles tendon  in  [address]      X [address]    COLONOSCOPY      HYSTERECTOMY      KNEE ARTHROPLASTY Left 2017    KNEE ARTHROPLASTY Right 2008    KNEE ARTHROSCOPY Bilateral     NERVE BLOCK Right 2021    RIGHT SUPRASCAPULAR NERVE BLOCK performed by Flory Serrato MD at [facility] [facility]  2017    excision of back mass    PAIN MANAGEMENT PROCEDURE Right 10/29/2021    CERVICAL EPIDURAL STEROID INJECTION - C7-T1 RIGHT performed by Flory Serrato MD at [facility] 2021    EGD W/ EMR performed by Marizol Madrigal MD at [address] [facility] 2021    ENDOSCOPIC ULTRASOUND WITH ENDOSCOPIC MUCOSAL RESECTION with 3 clips placed performed by Marizol Madrigal MD at [facility]      Medications Prior to Admission:   No current facility-administered medications on file prior to encounter. Current Outpatient Medications on File Prior to Encounter   Medication Sig Dispense Refill    gabapentin (NEURONTIN) 100 MG capsule Take 100 mg by mouth 3 times daily.       simvastatin (ZOCOR) 20 MG tablet Take 20 mg by mouth nightly      labetalol (NORMODYNE) 200 MG tablet Take 1 tablet by mouth 2 times daily 60 tablet 5    RESTASIS 0.05 % ophthalmic emulsion Place 1 drop into both eyes every 12 hours       acetaminophen (APAP EXTRA STRENGTH) 500 MG tablet Take 1 tablet by mouth every 6 hours as needed for Pain 40 tablet 3    cyclobenzaprine (FLEXERIL) 5 MG tablet Take 5 mg by mouth 3 times daily as needed for Muscle spasms       famotidine (PEPCID) 20 MG tablet Take 1 tablet by mouth 2 times daily as needed (heartburn) 60 tablet 3    fexofenadine (ALLEGRA) 180 MG tablet Take 180 mg by mouth daily as needed       fluticasone (FLONASE) 50 MCG/ACT nasal spray 1 spray by Each Nostril route daily as needed           Allergies:  Lisinopril and Tetracyclines & related    Social History:   Social History     Socioeconomic History    Marital status:      Spouse name: Not on file    Number of children: Not on file    Years of education: Not on file    Highest education level: Not on file   Occupational History    Not on file   Tobacco Use    Smoking status: Never Smoker    Smokeless tobacco: Never Used    Tobacco comment: quit when 29   Vaping Use    Vaping Use: Never used   Substance and Sexual Activity    Alcohol use: No    Drug use: No    Sexual activity: Not Currently   Other Topics Concern    Not on file   Social History Narrative    Not on file     Social Determinants of Health     Financial Resource Strain: Low Risk     Difficulty of Paying Living Expenses: Not hard at all   Food Insecurity: No Food Insecurity    Worried About Running Out of Food in the Last Year: Never true    Anderson of Food in the Last Year: Never true   Transportation Needs:     Lack of Transportation (Medical): Not on file    Lack of Transportation (Non-Medical):  Not on file   Physical Activity:     Days of Exercise per Week: Not on file    Minutes of Exercise per Session: Not on file   Stress:     Feeling of Stress : Not on file   Social Connections:     Frequency of Communication with Friends and Family: Not on file    Frequency of Social Gatherings with Friends and Family: Not on file    Attends Spiritism Services: Not on file    Active Member of Clubs or Organizations: Not on file

## 2021-12-03 NOTE — PROGRESS NOTES
Vss. No c/o. Tolerated sitting up and po fluids and crackers well. Family at bedside. Verbalized understanding of discharge instructions.

## 2021-12-07 ENCOUNTER — TELEPHONE (OUTPATIENT)
Dept: PHARMACY | Facility: CLINIC | Age: 71
End: 2021-12-07

## 2021-12-07 NOTE — TELEPHONE ENCOUNTER
POPULATION HEALTH CLINICAL PHARMACY REVIEW: ADHERENCE REVIEW  Identified care gap per [employer]; fills at [facility]: Statin adherence    Last Visit: 21    Patient also appears to be prescribed: SIMVASTATIN  TAB 20MG     Patient found in Outcomes MTM and is currently eligible for TIP    ASSESSMENT  STATIN ADHERENCE    Per Insurance Records through [employer] ( [facility] = 100%;  [facility] = 82%; Potential Fail Date: 21):   SIMVASTATIN  TAB 20MG last filled on 21 for 90 day supply. Next refill due: 21    Per Reconciled Dispense Report:  SIMVASTATIN  TAB 20MG last filled on 21 for 90 day supply. Per [employer]  SIMVASTATIN  TAB 20MG last filled on 21 for 90 day supply. Per [address]:   SIMVASTATIN  TAB 20MG last picked up on 21 for 90 day supply. 0 refills remaining. Billed through [employer] 0.00    Simvastatin will  on 22 so a new rx is needed then       Lab Results   Component Value Date    CHOL 188 10/05/2020    TRIG 173 (H) 10/05/2020    HDL 63 (H) 10/05/2020    LDLCALC 90 10/05/2020     ALT   Date Value Ref Range Status   10/05/2020 22 10 - 40 U/L Final     AST   Date Value Ref Range Status   10/05/2020 24 15 - 37 U/L Final     The 10-year ASCVD risk score (Lonza Oakland., et al., 2013) is: 16.7%    Values used to calculate the score:      Age: 66 years      Sex: Female      Is Non- : Yes      Diabetic: No      Tobacco smoker: No      Systolic Blood Pressure: 200 mmHg      Is BP treated: Yes      HDL Cholesterol: 63 mg/dL      Total Cholesterol: 188 mg/dL     PLAN  No patient out reach planned at this time. Pt appears to be adherent at this time      Future Appointments   Date Time Provider Ariel Santana   2022 10:20 AM [place] MD Hilda Butt [address] [facility] // Department, toll free [phone], Option 1     . For Pharmacy Admin Tracking Only     CPA in place:  No   Intervention Detail: Adherence Monitorin   Time Spent (min): 15

## 2022-01-06 ENCOUNTER — OFFICE VISIT (OUTPATIENT)
Dept: PRIMARY CARE CLINIC | Age: 72
End: 2022-01-06
Payer: MEDICARE

## 2022-01-06 VITALS
DIASTOLIC BLOOD PRESSURE: 85 MMHG | WEIGHT: 207.4 LBS | OXYGEN SATURATION: 100 % | HEART RATE: 74 BPM | BODY MASS INDEX: 34.55 KG/M2 | TEMPERATURE: 97 F | SYSTOLIC BLOOD PRESSURE: 145 MMHG | HEIGHT: 65 IN

## 2022-01-06 DIAGNOSIS — E78.49 OTHER HYPERLIPIDEMIA: ICD-10-CM

## 2022-01-06 DIAGNOSIS — C7A.8 NEURO-ENDOCRINE CANCER (HCC): ICD-10-CM

## 2022-01-06 DIAGNOSIS — M54.50 CHRONIC MIDLINE LOW BACK PAIN, UNSPECIFIED WHETHER SCIATICA PRESENT: ICD-10-CM

## 2022-01-06 DIAGNOSIS — G89.29 CHRONIC MIDLINE LOW BACK PAIN, UNSPECIFIED WHETHER SCIATICA PRESENT: ICD-10-CM

## 2022-01-06 DIAGNOSIS — I10 ESSENTIAL HYPERTENSION: ICD-10-CM

## 2022-01-06 LAB
A/G RATIO: 2 (ref 1.1–2.2)
ALBUMIN SERPL-MCNC: 4.9 G/DL (ref 3.4–5)
ALP BLD-CCNC: 79 U/L (ref 40–129)
ALT SERPL-CCNC: 21 U/L (ref 10–40)
ANION GAP SERPL CALCULATED.3IONS-SCNC: 13 MMOL/L (ref 3–16)
AST SERPL-CCNC: 18 U/L (ref 15–37)
BILIRUB SERPL-MCNC: 0.3 MG/DL (ref 0–1)
BUN BLDV-MCNC: 14 MG/DL (ref 7–20)
CALCIUM SERPL-MCNC: 9.8 MG/DL (ref 8.3–10.6)
CHLORIDE BLD-SCNC: 104 MMOL/L (ref 99–110)
CHOLESTEROL, TOTAL: 198 MG/DL (ref 0–199)
CO2: 27 MMOL/L (ref 21–32)
CREAT SERPL-MCNC: 0.9 MG/DL (ref 0.6–1.2)
GFR AFRICAN AMERICAN: >60
GFR NON-AFRICAN AMERICAN: >60
GLUCOSE BLD-MCNC: 100 MG/DL (ref 70–99)
HDLC SERPL-MCNC: 62 MG/DL (ref 40–60)
LDL CHOLESTEROL CALCULATED: 115 MG/DL
POTASSIUM SERPL-SCNC: 4.6 MMOL/L (ref 3.5–5.1)
SODIUM BLD-SCNC: 144 MMOL/L (ref 136–145)
TOTAL PROTEIN: 7.4 G/DL (ref 6.4–8.2)
TRIGL SERPL-MCNC: 107 MG/DL (ref 0–150)
TSH SERPL DL<=0.05 MIU/L-ACNC: 1.75 UIU/ML (ref 0.27–4.2)
VLDLC SERPL CALC-MCNC: 21 MG/DL

## 2022-01-06 PROCEDURE — 99214 OFFICE O/P EST MOD 30 MIN: CPT | Performed by: INTERNAL MEDICINE

## 2022-01-06 RX ORDER — LABETALOL 200 MG/1
200 TABLET, FILM COATED ORAL EVERY MORNING
Qty: 90 TABLET | Refills: 3 | Status: SHIPPED | OUTPATIENT
Start: 2022-01-06 | End: 2022-03-28 | Stop reason: SDUPTHER

## 2022-01-06 RX ORDER — LABETALOL 100 MG/1
100 TABLET, FILM COATED ORAL EVERY EVENING
Qty: 90 TABLET | Refills: 3 | Status: SHIPPED | OUTPATIENT
Start: 2022-01-06 | End: 2022-03-28 | Stop reason: SDUPTHER

## 2022-01-06 RX ORDER — AMLODIPINE BESYLATE 5 MG/1
5 TABLET ORAL DAILY
Qty: 90 TABLET | Refills: 3 | Status: SHIPPED | OUTPATIENT
Start: 2022-01-06 | End: 2022-03-28

## 2022-01-06 ASSESSMENT — ENCOUNTER SYMPTOMS
WHEEZING: 0
SHORTNESS OF BREATH: 0
CONSTIPATION: 0
EYES NEGATIVE: 1
ABDOMINAL DISTENTION: 0
CHEST TIGHTNESS: 0
ALLERGIC/IMMUNOLOGIC NEGATIVE: 1
ABDOMINAL PAIN: 0

## 2022-01-06 NOTE — PROGRESS NOTES
Subjective:      Patient ID: Allison Moreno is a 66 y.o. female. 1/6/2022 Patient presents with:  Hypertension  Cannot take pm dose of labetalol 200 ! Taking it in am + Norvasc   Abdominal Pain: no pain as such ; more back pain   . 1 month f/u Had repeat biopsy by Dr Mansoor Michelle 12/21 ; Nl pathology . CT abd / chest  8/21 done by Dr Rashid Marques ; advised f/u in a yr     Has questions anoout Omega 3 . Been off it for a while     Ch back pain ; C/O Ortho . Got steroid shots in back . Unable to exercise much b/e pain             Last seen  8/13/2021 Patient presents with:  Hypertension . NOT taking higher dose of Labetalol as prescribed at last visit . States readings are fine at home! 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 C/o  Dr Jermain Ross             7/7/2021 Patient presents with:  Medicare AWV  Hypertension  Stress: because of                 Last seen   1/7/2021 Patient presents with:  Hypertension  Generally well   No falls   Stressed with 's personality issues! Managing with reg daily exercise              6/30/2020   Medicare AWV          12/19/2019        10/15/19   Rupture injury / ankle while on Treadmill . C/O Podiatrist         3/26/19     1/29/19      9/18/18       8/15/18         7/26/18 Hypertension: stressed b/e of  'not giving me space '  Headache                    Hypertension  Pertinent negatives include no shortness of breath. Abdominal Pain  Pertinent negatives include no constipation or dysuria. Review of Systems   Constitutional: Negative for activity change, appetite change and fatigue. Flu  Vac 10/21    tdap  10/16    Zostavac 2013 / Randeen Newness . Shingrex 10/19    Pneumonia vac   5/19 ; 10/15    Covid Vac 10/21  Third booster    HENT:        Dental ex   Reg    Eyes: Negative. Negative for visual disturbance. Eye  Ex   11/20  .  Wears Glasses    Respiratory: Negative for chest amLODIPine (NORVASC) 5 MG tablet; Take 1 tablet by mouth daily  -     labetalol (NORMODYNE) 200 MG tablet; Take 1 tablet by mouth every morning  -     labetalol (NORMODYNE) 100 MG tablet; Take 1 tablet by mouth every evening    Other hyperlipidemia  On Zocor 20 . Off Omega 3 . Check Fasting Lipids   -     Lipid Panel; Future  -     TSH without Reflex; Future  -     Comprehensive Metabolic Panel; Future    [facility] S/P resection 7/21/21   C/O Dr Carie Casey and Dr Boby Cuevas . Clear for now     Chronic midline low back pain, unspecified whether sciatica present  C/O Ortho . Suggest water aerobics for exercise           Gastroesophageal reflux disease without esophagitis on PPI .  Pepcid for brkthru symptoms  -                             Plan:

## 2022-02-28 PROBLEM — D3A.098 CARCINOID TUMOR OF GASTROINTESTINAL TRACT: Status: ACTIVE | Noted: 2022-02-28

## 2022-03-14 RX ORDER — SIMVASTATIN 20 MG
TABLET ORAL
Qty: 90 TABLET | Refills: 0 | Status: SHIPPED | OUTPATIENT
Start: 2022-03-14 | End: 2022-06-01

## 2022-03-18 ENCOUNTER — TELEPHONE (OUTPATIENT)
Dept: PRIMARY CARE CLINIC | Age: 72
End: 2022-03-18

## 2022-03-18 NOTE — TELEPHONE ENCOUNTER
Pt stopped by the office to request medication for her migraine headache she has been having a headache for a week  Now please send medication to  [address] [facility] @ [phone] Please Advise

## 2022-03-19 ENCOUNTER — HOSPITAL ENCOUNTER (EMERGENCY)
Age: 72
Discharge: HOME | End: 2022-03-19
Attending: EMERGENCY MEDICINE
Payer: MEDICARE

## 2022-03-19 ENCOUNTER — APPOINTMENT (OUTPATIENT)
Dept: CT IMAGING | Age: 72
End: 2022-03-19
Payer: MEDICARE

## 2022-03-19 VITALS
SYSTOLIC BLOOD PRESSURE: 136 MMHG | BODY MASS INDEX: 34.97 KG/M2 | OXYGEN SATURATION: 99 % | TEMPERATURE: 98.6 F | DIASTOLIC BLOOD PRESSURE: 69 MMHG | WEIGHT: 209.88 LBS | RESPIRATION RATE: 18 BRPM | HEART RATE: 72 BPM | HEIGHT: 65 IN

## 2022-03-19 DIAGNOSIS — R51.9 NONINTRACTABLE HEADACHE, UNSPECIFIED CHRONICITY PATTERN, UNSPECIFIED HEADACHE TYPE: Primary | ICD-10-CM

## 2022-03-19 LAB
ANION GAP SERPL CALCULATED.3IONS-SCNC: 13 MMOL/L (ref 3–16)
BASOPHILS ABSOLUTE: 0 K/UL (ref 0–0.2)
BASOPHILS RELATIVE PERCENT: 0.4 %
BUN BLDV-MCNC: 18 MG/DL (ref 7–20)
CALCIUM SERPL-MCNC: 9.7 MG/DL (ref 8.3–10.6)
CHLORIDE BLD-SCNC: 105 MMOL/L (ref 99–110)
CO2: 23 MMOL/L (ref 21–32)
CREAT SERPL-MCNC: 1 MG/DL (ref 0.6–1.2)
EOSINOPHILS ABSOLUTE: 0.1 K/UL (ref 0–0.6)
EOSINOPHILS RELATIVE PERCENT: 1.3 %
GFR AFRICAN AMERICAN: >60
GFR NON-AFRICAN AMERICAN: 54
GLUCOSE BLD-MCNC: 122 MG/DL (ref 70–99)
HCT VFR BLD CALC: 34.4 % (ref 36–48)
HEMOGLOBIN: 11.5 G/DL (ref 12–16)
LYMPHOCYTES ABSOLUTE: 4.9 K/UL (ref 1–5.1)
LYMPHOCYTES RELATIVE PERCENT: 47 %
MCH RBC QN AUTO: 30.7 PG (ref 26–34)
MCHC RBC AUTO-ENTMCNC: 33.5 G/DL (ref 31–36)
MCV RBC AUTO: 91.8 FL (ref 80–100)
MONOCYTES ABSOLUTE: 0.7 K/UL (ref 0–1.3)
MONOCYTES RELATIVE PERCENT: 6.8 %
NEUTROPHILS ABSOLUTE: 4.7 K/UL (ref 1.7–7.7)
NEUTROPHILS RELATIVE PERCENT: 44.5 %
PDW BLD-RTO: 13.9 % (ref 12.4–15.4)
PLATELET # BLD: 308 K/UL (ref 135–450)
PMV BLD AUTO: 6.6 FL (ref 5–10.5)
POTASSIUM REFLEX MAGNESIUM: 4.4 MMOL/L (ref 3.5–5.1)
RBC # BLD: 3.75 M/UL (ref 4–5.2)
SODIUM BLD-SCNC: 141 MMOL/L (ref 136–145)
WBC # BLD: 10.5 K/UL (ref 4–11)

## 2022-03-19 PROCEDURE — 6370000000 HC RX 637 (ALT 250 FOR IP): Performed by: EMERGENCY MEDICINE

## 2022-03-19 PROCEDURE — 36415 COLL VENOUS BLD VENIPUNCTURE: CPT

## 2022-03-19 PROCEDURE — 2580000003 HC RX 258: Performed by: EMERGENCY MEDICINE

## 2022-03-19 PROCEDURE — 96375 TX/PRO/DX INJ NEW DRUG ADDON: CPT

## 2022-03-19 PROCEDURE — 80048 BASIC METABOLIC PNL TOTAL CA: CPT

## 2022-03-19 PROCEDURE — 96365 THER/PROPH/DIAG IV INF INIT: CPT

## 2022-03-19 PROCEDURE — 6360000002 HC RX W HCPCS: Performed by: EMERGENCY MEDICINE

## 2022-03-19 PROCEDURE — 99283 EMERGENCY DEPT VISIT LOW MDM: CPT

## 2022-03-19 PROCEDURE — 85025 COMPLETE CBC W/AUTO DIFF WBC: CPT

## 2022-03-19 PROCEDURE — 70450 CT HEAD/BRAIN W/O DYE: CPT

## 2022-03-19 RX ORDER — LORAZEPAM 1 MG/1
1 TABLET ORAL ONCE
Status: COMPLETED | OUTPATIENT
Start: 2022-03-19 | End: 2022-03-19

## 2022-03-19 RX ORDER — KETOROLAC TROMETHAMINE 10 MG/1
10 TABLET, FILM COATED ORAL EVERY 6 HOURS PRN
Qty: 4 TABLET | Refills: 0 | Status: SHIPPED | OUTPATIENT
Start: 2022-03-19 | End: 2022-04-29 | Stop reason: SDUPTHER

## 2022-03-19 RX ORDER — 0.9 % SODIUM CHLORIDE 0.9 %
1000 INTRAVENOUS SOLUTION INTRAVENOUS ONCE
Status: COMPLETED | OUTPATIENT
Start: 2022-03-19 | End: 2022-03-19

## 2022-03-19 RX ORDER — KETOROLAC TROMETHAMINE 30 MG/ML
30 INJECTION, SOLUTION INTRAMUSCULAR; INTRAVENOUS ONCE
Status: COMPLETED | OUTPATIENT
Start: 2022-03-19 | End: 2022-03-19

## 2022-03-19 RX ORDER — MAGNESIUM SULFATE IN WATER 40 MG/ML
2000 INJECTION, SOLUTION INTRAVENOUS ONCE
Status: COMPLETED | OUTPATIENT
Start: 2022-03-19 | End: 2022-03-19

## 2022-03-19 RX ORDER — CYCLOBENZAPRINE HCL 10 MG
10 TABLET ORAL 3 TIMES DAILY PRN
Qty: 12 TABLET | Refills: 0 | Status: SHIPPED | OUTPATIENT
Start: 2022-03-19 | End: 2022-03-29

## 2022-03-19 RX ORDER — METOCLOPRAMIDE HYDROCHLORIDE 5 MG/ML
10 INJECTION INTRAMUSCULAR; INTRAVENOUS ONCE
Status: COMPLETED | OUTPATIENT
Start: 2022-03-19 | End: 2022-03-19

## 2022-03-19 RX ORDER — DEXAMETHASONE SODIUM PHOSPHATE 10 MG/ML
10 INJECTION, SOLUTION INTRAMUSCULAR; INTRAVENOUS ONCE
Status: COMPLETED | OUTPATIENT
Start: 2022-03-19 | End: 2022-03-19

## 2022-03-19 RX ORDER — DIPHENHYDRAMINE HYDROCHLORIDE 50 MG/ML
12.5 INJECTION INTRAMUSCULAR; INTRAVENOUS ONCE
Status: COMPLETED | OUTPATIENT
Start: 2022-03-19 | End: 2022-03-19

## 2022-03-19 RX ORDER — BUTALBITAL, ACETAMINOPHEN AND CAFFEINE 300; 40; 50 MG/1; MG/1; MG/1
1 CAPSULE ORAL EVERY 4 HOURS PRN
Qty: 12 CAPSULE | Refills: 0 | Status: SHIPPED | OUTPATIENT
Start: 2022-03-19 | End: 2022-03-21 | Stop reason: SDUPTHER

## 2022-03-19 RX ADMIN — DEXAMETHASONE SODIUM PHOSPHATE 10 MG: 10 INJECTION, SOLUTION INTRAMUSCULAR; INTRAVENOUS at 05:41

## 2022-03-19 RX ADMIN — MAGNESIUM SULFATE HEPTAHYDRATE 2000 MG: 40 INJECTION, SOLUTION INTRAVENOUS at 06:18

## 2022-03-19 RX ADMIN — METOCLOPRAMIDE HYDROCHLORIDE 10 MG: 5 INJECTION INTRAMUSCULAR; INTRAVENOUS at 05:42

## 2022-03-19 RX ADMIN — SODIUM CHLORIDE 1000 ML: 9 INJECTION, SOLUTION INTRAVENOUS at 05:38

## 2022-03-19 RX ADMIN — LORAZEPAM 1 MG: 1 TABLET ORAL at 05:40

## 2022-03-19 RX ADMIN — DIPHENHYDRAMINE HYDROCHLORIDE 12.5 MG: 50 INJECTION, SOLUTION INTRAMUSCULAR; INTRAVENOUS at 05:41

## 2022-03-19 RX ADMIN — KETOROLAC TROMETHAMINE 30 MG: 30 INJECTION, SOLUTION INTRAMUSCULAR at 05:41

## 2022-03-19 ASSESSMENT — ENCOUNTER SYMPTOMS
VOMITING: 0
ABDOMINAL PAIN: 0
EYE ITCHING: 0
EYE DISCHARGE: 0
SHORTNESS OF BREATH: 0
COUGH: 0
COLOR CHANGE: 0
CONSTIPATION: 0

## 2022-03-19 ASSESSMENT — PAIN SCALES - GENERAL: PAINLEVEL_OUTOF10: 10

## 2022-03-19 NOTE — ED PROVIDER NOTES
[facility]      Pt Name: Nohemi Forbes  MRN: [id number]  [place] 1950  Date of evaluation: 3/19/2022  Provider: Alisha Laws MD    CHIEF COMPLAINT       Chief Complaint   Patient presents with    Headache       HISTORY OF PRESENT ILLNESS    Nohemi Forbes is a 66 y.o. female who presents to the emergency department with headache. Patient presents with headache that is been going on for the last few days. She has a history of headaches. States it feels like her usual headache just not going away. Has tried a couple over-the-counter medications for pain that have not been improving. States she has had to come to the emergency room to get an IV and medications to treat her headaches at times. Denies weakness, slurring of speech, facial droop. States this feels like her usual headaches. Currently headache is 9 out of 10 achy in nature. Starts in the back of her head and is moved to the frontal region. No neck pain. No fevers or chills. No sinus pain. No shortness of breath. No other associated symptoms. Nursing Notes were reviewed. Including nursing noted for FM, Surgical History, Past Medical History, Social History, vitals, and allergies; agree with all. REVIEW OF SYSTEMS       Review of Systems   Constitutional: Negative for diaphoresis and unexpected weight change. HENT: Negative for congestion and dental problem. Eyes: Negative for discharge and itching. Respiratory: Negative for cough and shortness of breath. Cardiovascular: Negative for chest pain and leg swelling. Gastrointestinal: Negative for abdominal pain, constipation and vomiting. Endocrine: Negative for cold intolerance and heat intolerance. Genitourinary: Negative for vaginal bleeding, vaginal discharge and vaginal pain. Musculoskeletal: Negative for neck pain and neck stiffness. Skin: Negative for color change and pallor. Neurological: Positive for headaches. Negative for tremors and weakness. Psychiatric/Behavioral: Negative for agitation and behavioral problems. Except as noted above the remainder of the review of systems was reviewed and negative.      PAST MEDICAL HISTORY     Past Medical History:   Diagnosis Date    Acid reflux     Headache(784.0)     Hyperlipidemia     Hypertension     Osteoarthritis        SURGICAL HISTORY       Past Surgical History:   Procedure Laterality Date    ANKLE SURGERY      bone spur removed from achilles tendon  in      APPENDECTOMY       SECTION      X 2    COLONOSCOPY      HYSTERECTOMY      KNEE ARTHROPLASTY Left 2017    KNEE ARTHROPLASTY Right 2008    KNEE ARTHROSCOPY Bilateral     NERVE BLOCK Right 2021    RIGHT SUPRASCAPULAR NERVE BLOCK performed by Armando Webb MD at [address] [facility]  2017    excision of back mass    PAIN MANAGEMENT PROCEDURE Right 10/29/2021    CERVICAL EPIDURAL STEROID INJECTION - C7-T1 RIGHT performed by Armando Webb MD at [facility] 2021    EGD W/ EMR performed by Tanner Dumont MD at [facility] 2021    ENDOSCOPIC ULTRASOUND WITH ENDOSCOPIC MUCOSAL RESECTION with 3 clips placed performed by Tanner Dumont MD at [facility] 12/3/2021    EGD BIOPSY performed by Tanner Dumont MD at [address] [facility]       Previous Medications    ACETAMINOPHEN (APAP EXTRA STRENGTH) 500 MG TABLET    Take 1 tablet by mouth every 6 hours as needed for Pain    AMLODIPINE (NORVASC) 5 MG TABLET    Take 1 tablet by mouth daily    CYCLOBENZAPRINE (FLEXERIL) 5 MG TABLET    Take 5 mg by mouth 3 times daily as needed for Muscle spasms     FAMOTIDINE (PEPCID) 20 MG TABLET    Take 1 tablet by mouth 2 times daily as needed (heartburn) FEXOFENADINE (ALLEGRA) 180 MG TABLET    Take 180 mg by mouth daily as needed     FLUTICASONE (FLONASE) 50 MCG/ACT NASAL SPRAY    1 spray by Each Nostril route daily as needed     GABAPENTIN (NEURONTIN) 100 MG CAPSULE    Take 100 mg by mouth 3 times daily. LABETALOL (NORMODYNE) 100 MG TABLET    Take 1 tablet by mouth every evening    LABETALOL (NORMODYNE) 200 MG TABLET    Take 1 tablet by mouth every morning    OMEPRAZOLE (PRILOSEC) 40 MG DELAYED RELEASE CAPSULE    TAKE ONE CAPSULE BY MOUTH EVERY MORNING BEFORE BREAKFAST    RESTASIS 0.05 % OPHTHALMIC EMULSION    Place 1 drop into both eyes every 12 hours     SIMVASTATIN (ZOCOR) 20 MG TABLET    TAKE ONE TABLET BY MOUTH ONCE NIGHTLY       ALLERGIES     Lisinopril and Tetracyclines & related    FAMILY HISTORY        Family History   Problem Relation Age of Onset    High Blood Pressure Mother     Arthritis Father        SOCIAL HISTORY       Social History     Socioeconomic History    Marital status:      Spouse name: Not on file    Number of children: Not on file    Years of education: Not on file    Highest education level: Not on file   Occupational History    Not on file   Tobacco Use    Smoking status: Never Smoker    Smokeless tobacco: Never Used    Tobacco comment: quit when 29   Vaping Use    Vaping Use: Never used   Substance and Sexual Activity    Alcohol use: No    Drug use: No    Sexual activity: Not Currently   Other Topics Concern    Not on file   Social History Narrative    Not on file     Social Determinants of Health     Financial Resource Strain: Low Risk     Difficulty of Paying Living Expenses: Not hard at all   Food Insecurity: No Food Insecurity    Worried About Running Out of Food in the Last Year: Never true    Anderson of Food in the Last Year: Never true   Transportation Needs:     Lack of Transportation (Medical): Not on file    Lack of Transportation (Non-Medical):  Not on file   Physical Activity:     Days of Exercise per Week: Not on file    Minutes of Exercise per Session: Not on file   Stress:     Feeling of Stress : Not on file   Social Connections:     Frequency of Communication with Friends and Family: Not on file    Frequency of Social Gatherings with Friends and Family: Not on file    Attends Cheondoism Services: Not on file    Active Member of [address] [place] [address] [employer] or Organizations: Not on file    Attends Club or Organization Meetings: Not on file    Marital Status: Not on file   Intimate Partner Violence:     Fear of Current or Ex-Partner: Not on file    Emotionally Abused: Not on file    Physically Abused: Not on file    Sexually Abused: Not on file   Housing Stability:     Unable to Pay for Housing in the Last Year: Not on file    Number of [place] in the Last Year: Not on file    Unstable Housing in the Last Year: Not on file       PHYSICAL EXAM       ED Triage Vitals [03/19/22 0521]   BP Temp Temp Source Pulse Resp SpO2 Height Weight   (!) 173/85 98.6 °F (37 °C) Oral 72 18 99 % 5' 5\" (1.651 m) 210 lb 12.2 oz (95.6 kg)       Physical Exam  Vitals and nursing note reviewed. Constitutional:       General: She is not in acute distress. Appearance: She is well-developed. She is not ill-appearing, toxic-appearing or diaphoretic. HENT:      Head: Normocephalic and atraumatic. Right Ear: External ear normal.      Left Ear: External ear normal.   Eyes:      General:         Right eye: No discharge. Left eye: No discharge. Conjunctiva/sclera: Conjunctivae normal.      Pupils: Pupils are equal, round, and reactive to light. Cardiovascular:      Rate and Rhythm: Normal rate and regular rhythm. Heart sounds: No murmur heard. Pulmonary:      Effort: Pulmonary effort is normal. No respiratory distress. Breath sounds: Normal breath sounds. No wheezing or rales. Abdominal:      General: Bowel sounds are normal. There is no distension. Palpations: Abdomen is soft.  There is no mass. Tenderness: There is no abdominal tenderness. There is no guarding or rebound. Genitourinary:     Comments: Deferred  Musculoskeletal:         General: No deformity. Normal range of motion. Cervical back: Normal range of motion and neck supple. Skin:     General: Skin is warm. Findings: No erythema or rash. Neurological:      General: No focal deficit present. Mental Status: She is alert and oriented to person, place, and time. She is not disoriented. Cranial Nerves: No cranial nerve deficit. Sensory: No sensory deficit. Motor: No weakness, atrophy or abnormal muscle tone. Coordination: Coordination normal.      Gait: Gait normal.   Psychiatric:         Behavior: Behavior normal.         Thought Content: Thought content normal.         DIAGNOSTIC RESULTS     RADIOLOGY:   Non-plain film images such as CT, Ultrasoundand MRI are read by the radiologist. Plain radiographic images are visualized and preliminarily interpreted by the emergency physician with the below findings:    CT head reassuring    ED BEDSIDE ULTRASOUND:   Performed by ED Physician - none    LABS:  Labs Reviewed - No data to display    All other labs were withinnormal range or not returned as of this dictation. EMERGENCY DEPARTMENT COURSE and DIFFERENTIAL DIAGNOSIS/MDM:     PMH, Surgical Hx, FH, Social Hx reviewed by myself (ETOH usage, Tobacco usage, Drug usage reviewed by myself, no pertinent Hx)- No Pertinent Hx     Old records were reviewed by me    79-year-old with headache. Reassuring work-up. Feels significantly better. Pain down to 2 out of 10 on discharge. No focal deficits. CT head reassuring. Close outpatient follow-up. I estimate there is LOW risk for Sepsis, MI, Stroke, Tamponade, PTX, Toxicity or other life threatening etiology thus I consider the discharge disposition reasonable.      The patient is at low risk for mortality based on demographic, history and clinical factors. Given the best available information and clinical assessment, I estimate the risk of hospitalization to be greater than risk of treatment at home. I have explained to the patient that the risk could rapidly change, given precautions for return and instructions. Explained to patient that the risk for mortality is low based on demographic, history and clinical factors. I discussed with patient the results of evaluation in the ED, diagnosis, care, and prognosis. The plan is to discharge to home. Patient is in agreement with plan and questions have been answered. I also discussed with patient the reasons which may require a return visit and the importance of follow-up care. The patient is well-appearing, nontoxic, and improved at the time of discharge. Patient agrees to call to arrange follow-up care as directed. Patient understands to return immediately for worsening/change in symptoms. CRITICAL CARE TIME   Total Critical Caretime was 21 minutes, excluding separately reportable procedures. There was a high probability of clinically significant/life threatening deterioration in the patient's condition which required my urgent intervention. PROCEDURES:  Unlessotherwise noted below, none    FINAL IMPRESSION      1.  Nonintractable headache, unspecified chronicity pattern, unspecified headache type          DISPOSITION/PLAN   DISPOSITION      PATIENT REFERRED TO:  Luciano Medeiros MD  [address] [id number] [phone]            DISCHARGE MEDICATIONS:  New Prescriptions    BUTALBITAL-APAP-CAFFEINE (FIORICET) -40 MG CAPS PER CAPSULE    Take 1 capsule by mouth every 4 hours as needed for Headaches    CYCLOBENZAPRINE (FLEXERIL) 10 MG TABLET    Take 1 tablet by mouth 3 times daily as needed for Muscle spasms    KETOROLAC (TORADOL) 10 MG TABLET    Take 1 tablet by mouth every 6 hours as needed for Pain          (Please note that portions ofthis note were completed with a voice recognition program.  Efforts were made to edit the dictations but occasionally words are mis-transcribed.)    Karis Salcido MD(electronically signed)  Attending Emergency Physician            Karis Salcido MD  03/19/22 5073

## 2022-03-21 RX ORDER — BUTALBITAL, ACETAMINOPHEN AND CAFFEINE 300; 40; 50 MG/1; MG/1; MG/1
1 CAPSULE ORAL EVERY 6 HOURS PRN
Qty: 30 CAPSULE | Refills: 1 | Status: SHIPPED | OUTPATIENT
Start: 2022-03-21 | End: 2022-04-29 | Stop reason: SDUPTHER

## 2022-03-28 ENCOUNTER — OFFICE VISIT (OUTPATIENT)
Dept: PRIMARY CARE CLINIC | Age: 72
End: 2022-03-28
Payer: MEDICARE

## 2022-03-28 VITALS
WEIGHT: 211 LBS | OXYGEN SATURATION: 100 % | DIASTOLIC BLOOD PRESSURE: 80 MMHG | HEART RATE: 77 BPM | HEIGHT: 65 IN | BODY MASS INDEX: 35.16 KG/M2 | SYSTOLIC BLOOD PRESSURE: 160 MMHG | TEMPERATURE: 97.4 F

## 2022-03-28 DIAGNOSIS — E66.01 SEVERE OBESITY (BMI 35.0-39.9) WITH COMORBIDITY (HCC): ICD-10-CM

## 2022-03-28 DIAGNOSIS — I10 ESSENTIAL HYPERTENSION: ICD-10-CM

## 2022-03-28 DIAGNOSIS — F41.9 ANXIETY: ICD-10-CM

## 2022-03-28 DIAGNOSIS — R73.03 PREDIABETES: Primary | ICD-10-CM

## 2022-03-28 LAB — HBA1C MFR BLD: 6.4 %

## 2022-03-28 PROCEDURE — 99214 OFFICE O/P EST MOD 30 MIN: CPT | Performed by: INTERNAL MEDICINE

## 2022-03-28 PROCEDURE — 83036 HEMOGLOBIN GLYCOSYLATED A1C: CPT | Performed by: INTERNAL MEDICINE

## 2022-03-28 RX ORDER — LABETALOL 200 MG/1
200 TABLET, FILM COATED ORAL EVERY MORNING
Qty: 90 TABLET | Refills: 3 | Status: SHIPPED | OUTPATIENT
Start: 2022-03-28 | End: 2022-04-22 | Stop reason: SDUPTHER

## 2022-03-28 RX ORDER — LABETALOL 100 MG/1
100 TABLET, FILM COATED ORAL EVERY EVENING
Qty: 90 TABLET | Refills: 3 | Status: SHIPPED | OUTPATIENT
Start: 2022-03-28 | End: 2022-04-22 | Stop reason: SDUPTHER

## 2022-03-28 RX ORDER — ALPRAZOLAM 0.25 MG/1
0.25 TABLET ORAL 3 TIMES DAILY PRN
Qty: 30 TABLET | Refills: 1 | Status: SHIPPED | OUTPATIENT
Start: 2022-03-28 | End: 2022-04-27

## 2022-03-28 RX ORDER — AMLODIPINE BESYLATE 10 MG/1
10 TABLET ORAL DAILY
Qty: 90 TABLET | Refills: 3 | Status: SHIPPED | OUTPATIENT
Start: 2022-03-28 | End: 2022-04-22 | Stop reason: SDUPTHER

## 2022-03-28 ASSESSMENT — PATIENT HEALTH QUESTIONNAIRE - PHQ9
1. LITTLE INTEREST OR PLEASURE IN DOING THINGS: 0
SUM OF ALL RESPONSES TO PHQ QUESTIONS 1-9: 1
2. FEELING DOWN, DEPRESSED OR HOPELESS: 1
SUM OF ALL RESPONSES TO PHQ9 QUESTIONS 1 & 2: 1
SUM OF ALL RESPONSES TO PHQ QUESTIONS 1-9: 1

## 2022-03-28 ASSESSMENT — ENCOUNTER SYMPTOMS
WHEEZING: 0
CONSTIPATION: 0
EYES NEGATIVE: 1
SHORTNESS OF BREATH: 0
ABDOMINAL PAIN: 0
CHEST TIGHTNESS: 0
ALLERGIC/IMMUNOLOGIC NEGATIVE: 1
ABDOMINAL DISTENTION: 0

## 2022-03-28 NOTE — PROGRESS NOTES
Subjective:      Patient ID: Elizabeth Coppola is a 66 y.o. female. 3/28/2022 Patient presents with:  Hypertension  Headache  Depression: with anxiety . Concerned about wt gain                 Last seen  1/6/2022 Patient presents with:  Hypertension  Cannot take pm dose of labetalol 200 ! Taking it in am + Norvasc   Abdominal Pain: no pain as such ; more back pain   . 1 month f/u Had repeat biopsy by Dr Drea Jonas 12/21 ; Nl pathology . CT abd / chest  8/21 done by Dr Isabel Laura ; advised f/u in a yr     Has questions anoout Omega 3 . Been off it for a while     Ch back pain ; C/O Ortho . Got steroid shots in back . Unable to exercise much b/e pain             Last seen  8/13/2021 Patient presents with:  Hypertension . NOT taking higher dose of Labetalol as prescribed at last visit . States readings are fine at home! 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 C/o  Dr Jevon Valadez             7/7/2021 Patient presents with:  Medicare AWV  Hypertension  Stress: because of                 Last seen   1/7/2021 Patient presents with:  Hypertension  Generally well   No falls   Stressed with 's personality issues! Managing with reg daily exercise              6/30/2020   Medicare AWV          12/19/2019        10/15/19   Rupture injury / ankle while on Treadmill . C/O Podiatrist         3/26/19     1/29/19      9/18/18       8/15/18         7/26/18 Hypertension: stressed b/e of  'not giving me space '  Headache                    Hypertension  Pertinent negatives include no shortness of breath. Abdominal Pain  Pertinent negatives include no constipation or dysuria. Review of Systems   Constitutional: Positive for appetite change and unexpected weight change. Negative for activity change and fatigue. Flu  Vac 10/21    tdap  10/16    Zostavac 2013 / [place] .   Shingrex 10/19    Pneumonia vac   5/19 ; 10/15    Covid Vac 10/21 Third booster    HENT:        Dental ex   Reg    Eyes: Negative. Negative for visual disturbance. Eye  Ex   11/20  . Wears Glasses    Respiratory: Negative for chest tightness, shortness of breath and wheezing. Does not smoke . No Etoh . No asthma   Cardiovascular:        HTN > 10 yrs  , No known CAD     Mom had MI in 76s   Gastrointestinal: Negative for abdominal distention, abdominal pain and constipation. 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 C/o  Dr Hanny Lockhart           Repeat Upper Endoscopy 6/21  Schatzki ring   Repeat lower Colonoscopy NL  Dr Yan Celis / [facility] 3/2014  . Clean . Good for 10 yrs . Dr Heather Coronado         Endocrine: Negative. No FH of diabetes   Genitourinary: Negative for dysuria. S/P Hysterectomy . Mammogram 4/21    Daughter 39, with Breast Cancer   Musculoskeletal: Negative for joint swelling. DEXA 11/13 . No osteopenia    S/P Rt knee replac 4/10/17 . Left knee 2009    Allergic/Immunologic: Negative. Negative for food allergies. Psychiatric/Behavioral: Positive for dysphoric mood. Negative for sleep disturbance. The patient is nervous/anxious. Eas seeing  Psychiatry / Ms Jef Bansal . Objective:   Physical Exam  Vitals reviewed. Constitutional:       Appearance: She is obese. Cardiovascular:      Rate and Rhythm: Normal rate and regular rhythm. Heart sounds: Normal heart sounds. Pulmonary:      Breath sounds: Normal breath sounds. Abdominal:      General: There is distension. Palpations: Abdomen is soft. Tenderness: There is no abdominal tenderness. Musculoskeletal:         General: Normal range of motion. Cervical back: No rigidity. Skin:     General: Skin is warm. Neurological:      Mental Status: She is oriented to person, place, and time. Psychiatric:         Mood and Affect: Mood is anxious and depressed. Comments: Crying         Assessment:   Emmanuel Gant was seen today for hypertension, headache and depression. Diagnoses and all orders for this visit:    Prediabetes A1C 6.4 . Advised reg daily exercise + Wt reduction   -     POCT glycosylated hemoglobin (Hb A1C)    Essential hypertension  Uncontrolled . Increase Norvasc to 10 mg / d . Low salt diet   -     amLODIPine (NORVASC) 10 MG tablet; Take 1 tablet by mouth daily  -     labetalol (NORMODYNE) 200 MG tablet; Take 1 tablet by mouth every morning  -     labetalol (NORMODYNE) 100 MG tablet; Take 1 tablet by mouth every evening    Severe obesity (BMI 35.0-39. 9) with comorbidity ([address]  Advised low carb diet + intermittent Fasting + exercise     Anxiety    Consider starting SSRI/ Zoloft   -     ALPRAZolam (XANAX) 0.25 MG tablet; Take 1 tablet by mouth 3 times daily as needed for Anxiety for up to 30 days. Other hyperlipidemia  On Zocor 20 . Off Omega 3 . Check Fasting Lipids   -   Neuro-endocrine cancer (HCC) S/P resection 7/21/21   C/O Dr Cassia Goldstein and Dr Marie Greer . Clear for now         Gastroesophageal reflux disease without esophagitis on PPI .  Pepcid for brkthru symptoms  -                             Plan:

## 2022-04-22 ENCOUNTER — OFFICE VISIT (OUTPATIENT)
Dept: PRIMARY CARE CLINIC | Age: 72
End: 2022-04-22
Payer: MEDICARE

## 2022-04-22 VITALS
SYSTOLIC BLOOD PRESSURE: 130 MMHG | HEIGHT: 65 IN | DIASTOLIC BLOOD PRESSURE: 70 MMHG | OXYGEN SATURATION: 97 % | TEMPERATURE: 98.2 F | HEART RATE: 71 BPM | BODY MASS INDEX: 34.16 KG/M2 | WEIGHT: 205 LBS

## 2022-04-22 DIAGNOSIS — F41.9 ANXIETY: Primary | ICD-10-CM

## 2022-04-22 DIAGNOSIS — I10 ESSENTIAL HYPERTENSION: ICD-10-CM

## 2022-04-22 PROBLEM — N18.30 CHRONIC RENAL DISEASE, STAGE III (HCC): Status: ACTIVE | Noted: 2022-04-22

## 2022-04-22 PROCEDURE — 99214 OFFICE O/P EST MOD 30 MIN: CPT | Performed by: INTERNAL MEDICINE

## 2022-04-22 RX ORDER — LABETALOL 100 MG/1
100 TABLET, FILM COATED ORAL EVERY EVENING
Qty: 90 TABLET | Refills: 3 | Status: SHIPPED | OUTPATIENT
Start: 2022-04-22

## 2022-04-22 RX ORDER — AMLODIPINE BESYLATE 10 MG/1
10 TABLET ORAL DAILY
Qty: 90 TABLET | Refills: 3 | Status: SHIPPED | OUTPATIENT
Start: 2022-04-22

## 2022-04-22 RX ORDER — LABETALOL 200 MG/1
200 TABLET, FILM COATED ORAL EVERY MORNING
Qty: 90 TABLET | Refills: 3 | Status: SHIPPED | OUTPATIENT
Start: 2022-04-22

## 2022-04-22 ASSESSMENT — PATIENT HEALTH QUESTIONNAIRE - PHQ9
4. FEELING TIRED OR HAVING LITTLE ENERGY: 0
2. FEELING DOWN, DEPRESSED OR HOPELESS: 0
1. LITTLE INTEREST OR PLEASURE IN DOING THINGS: 0
5. POOR APPETITE OR OVEREATING: 0
3. TROUBLE FALLING OR STAYING ASLEEP: 0
8. MOVING OR SPEAKING SO SLOWLY THAT OTHER PEOPLE COULD HAVE NOTICED. OR THE OPPOSITE, BEING SO FIGETY OR RESTLESS THAT YOU HAVE BEEN MOVING AROUND A LOT MORE THAN USUAL: 0
SUM OF ALL RESPONSES TO PHQ QUESTIONS 1-9: 0
9. THOUGHTS THAT YOU WOULD BE BETTER OFF DEAD, OR OF HURTING YOURSELF: 0
10. IF YOU CHECKED OFF ANY PROBLEMS, HOW DIFFICULT HAVE THESE PROBLEMS MADE IT FOR YOU TO DO YOUR WORK, TAKE CARE OF THINGS AT HOME, OR GET ALONG WITH OTHER PEOPLE: 0
7. TROUBLE CONCENTRATING ON THINGS, SUCH AS READING THE NEWSPAPER OR WATCHING TELEVISION: 0
SUM OF ALL RESPONSES TO PHQ9 QUESTIONS 1 & 2: 0
SUM OF ALL RESPONSES TO PHQ QUESTIONS 1-9: 0
SUM OF ALL RESPONSES TO PHQ QUESTIONS 1-9: 0
6. FEELING BAD ABOUT YOURSELF - OR THAT YOU ARE A FAILURE OR HAVE LET YOURSELF OR YOUR FAMILY DOWN: 0
SUM OF ALL RESPONSES TO PHQ QUESTIONS 1-9: 0

## 2022-04-22 ASSESSMENT — ENCOUNTER SYMPTOMS
SHORTNESS OF BREATH: 0
ABDOMINAL DISTENTION: 0
CONSTIPATION: 0
EYES NEGATIVE: 1
WHEEZING: 0
ABDOMINAL PAIN: 0
CHEST TIGHTNESS: 0
ALLERGIC/IMMUNOLOGIC NEGATIVE: 1

## 2022-04-22 NOTE — TELEPHONE ENCOUNTER
[phone] #2    Tonia Lezama RN     Try Omeprazole instead

## 2022-04-22 NOTE — PROGRESS NOTES
Subjective:      Patient ID: Robe Rehman is a 66 y.o. female. 4/22/2022 Patient presents with: Other: follow up (BP)    Exercising reg . Not needing xanax           Last seen  3/28/2022 Patient presents with:  Hypertension  Headache  Depression: with anxiety . Concerned about wt gain                 1/6/2022 Patient presents with:  Hypertension  Cannot take pm dose of labetalol 200 ! Taking it in am + Norvasc   Abdominal Pain: no pain as such ; more back pain   . 1 month f/u Had repeat biopsy by Dr Reggie Garcia 12/21 ; Nl pathology . CT abd / chest  8/21 done by Dr Surekha Starr ; advised f/u in a yr     Has questions anoout Omega 3 . Been off it for a while     Ch back pain ; C/O Ortho . Got steroid shots in back . Unable to exercise much b/e pain             Last seen  8/13/2021 Patient presents with:  Hypertension . NOT taking higher dose of Labetalol as prescribed at last visit . States readings are fine at home! 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 C/o  Dr Samia George             7/7/2021 Patient presents with:  Medicare AWV  Hypertension  Stress: because of                 Last seen   1/7/2021 Patient presents with:  Hypertension  Generally well   No falls   Stressed with 's personality issues! Managing with reg daily exercise              6/30/2020   Medicare AWV          12/19/2019        10/15/19   Rupture injury / ankle while on Treadmill . C/O Podiatrist         3/26/19     1/29/19      9/18/18       8/15/18         7/26/18 Hypertension: stressed b/e of  'not giving me space '  Headache                    Hypertension  Pertinent negatives include no shortness of breath. Abdominal Pain  Pertinent negatives include no constipation or dysuria. Other  Pertinent negatives include no abdominal pain, fatigue or joint swelling.        Review of Systems   Constitutional: Positive for appetite change and unexpected weight change. Negative for activity change and fatigue. Flu  Vac 10/21    tdap  10/16    Zostavac 2013 / Lelo Holley . Shingrex 10/19    Pneumonia vac   5/19 ; 10/15    Covid Vac 10/21  Third booster    HENT:        Dental ex   Reg    Eyes: Negative. Negative for visual disturbance. Eye  Ex   11/20  . Wears Glasses    Respiratory: Negative for chest tightness, shortness of breath and wheezing. Does not smoke . No Etoh . No asthma   Cardiovascular:        HTN > 10 yrs  , No known CAD     Mom had MI in 76s   Gastrointestinal: Negative for abdominal distention, abdominal pain and constipation. 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 C/o  Dr Eber Arenas           Repeat Upper Endoscopy 6/21  Schatzki ring   Repeat lower Colonoscopy NL  Dr Detsini Lozano / [facility] 3/[facility]  . Clean . Good for 10 yrs . Dr Layla Mcneal         Endocrine: Negative. No FH of diabetes   Genitourinary: Negative for dysuria. S/P Hysterectomy . Mammogram 4/21    Daughter 39, with Breast Cancer   Musculoskeletal: Negative for joint swelling. DEXA 11/13 . No osteopenia    S/P Rt knee replac 4/10/17 . Left knee 2009    Allergic/Immunologic: Negative. Negative for food allergies. Psychiatric/Behavioral: Positive for dysphoric mood. Negative for sleep disturbance. The patient is nervous/anxious. Eas seeing  Psychiatry / Ms Fabricio Seth . Objective:   Physical Exam  Vitals reviewed. Constitutional:       General: She is not in acute distress. Appearance: She is obese. Comments: Wt loss 6 lbs since 3/28/22   Cardiovascular:      Rate and Rhythm: Normal rate and regular rhythm. Heart sounds: Normal heart sounds. Pulmonary:      Breath sounds: Normal breath sounds. Abdominal:      General: There is distension. Palpations: Abdomen is soft. Musculoskeletal:         General: Normal range of motion. Cervical back: No rigidity. Right lower leg: Edema (ankles) present. Left lower leg: Edema (ankles) present. Skin:     General: Skin is warm. Neurological:      Mental Status: She is oriented to person, place, and time. Psychiatric:         Mood and Affect: Mood normal.         Speech: Speech normal.      Comments: Feeling much better since last visit          Assessment:   Jaci Baker was seen today for other. Diagnoses and all orders for this visit:    Anxiety  In check with exercise . Essential hypertension  Controlled with higher dose Norvasc   -     amLODIPine (NORVASC) 10 MG tablet; Take 1 tablet by mouth daily  -     labetalol (NORMODYNE) 200 MG tablet; Take 1 tablet by mouth every morning  -     labetalol (NORMODYNE) 100 MG tablet; Take 1 tablet by mouth every evening        PreDiabetes  A1C 6.4 . Cont exercise reg + low carb diet         Other hyperlipidemia  On Zocor 20 . Off Omega 3 .   -   Neuro-endocrine cancer ([facility] [address] S/P resection 7/21/21   C/O Dr Vandy Burkitt and Dr Easton Cottrell . Clear for now         Gastroesophageal reflux disease without esophagitis on PPI .  Pepcid for brkthru symptoms  -                             Plan:

## 2022-04-28 ENCOUNTER — TELEPHONE (OUTPATIENT)
Dept: PRIMARY CARE CLINIC | Age: 72
End: 2022-04-28

## 2022-04-28 ENCOUNTER — HOSPITAL ENCOUNTER (EMERGENCY)
Age: 72
Discharge: HOME | End: 2022-04-29
Attending: EMERGENCY MEDICINE
Payer: MEDICARE

## 2022-04-28 DIAGNOSIS — R51.9 ACUTE NONINTRACTABLE HEADACHE, UNSPECIFIED HEADACHE TYPE: Primary | ICD-10-CM

## 2022-04-28 LAB
BASOPHILS ABSOLUTE: 0 K/UL (ref 0–0.2)
BASOPHILS RELATIVE PERCENT: 0.6 %
EOSINOPHILS ABSOLUTE: 0.1 K/UL (ref 0–0.6)
EOSINOPHILS RELATIVE PERCENT: 2.4 %
HCT VFR BLD CALC: 36.2 % (ref 36–48)
HEMOGLOBIN: 11.9 G/DL (ref 12–16)
LYMPHOCYTES ABSOLUTE: 3.3 K/UL (ref 1–5.1)
LYMPHOCYTES RELATIVE PERCENT: 56 %
MCH RBC QN AUTO: 30.3 PG (ref 26–34)
MCHC RBC AUTO-ENTMCNC: 32.9 G/DL (ref 31–36)
MCV RBC AUTO: 92.1 FL (ref 80–100)
MONOCYTES ABSOLUTE: 0.5 K/UL (ref 0–1.3)
MONOCYTES RELATIVE PERCENT: 8 %
NEUTROPHILS ABSOLUTE: 1.9 K/UL (ref 1.7–7.7)
NEUTROPHILS RELATIVE PERCENT: 33 %
PDW BLD-RTO: 13.9 % (ref 12.4–15.4)
PLATELET # BLD: 317 K/UL (ref 135–450)
PMV BLD AUTO: 7.1 FL (ref 5–10.5)
RBC # BLD: 3.93 M/UL (ref 4–5.2)
WBC # BLD: 5.9 K/UL (ref 4–11)

## 2022-04-28 PROCEDURE — 99284 EMERGENCY DEPT VISIT MOD MDM: CPT

## 2022-04-28 PROCEDURE — 96374 THER/PROPH/DIAG INJ IV PUSH: CPT

## 2022-04-28 PROCEDURE — 2580000003 HC RX 258: Performed by: EMERGENCY MEDICINE

## 2022-04-28 PROCEDURE — 6360000002 HC RX W HCPCS: Performed by: EMERGENCY MEDICINE

## 2022-04-28 PROCEDURE — 96375 TX/PRO/DX INJ NEW DRUG ADDON: CPT

## 2022-04-28 PROCEDURE — 36415 COLL VENOUS BLD VENIPUNCTURE: CPT

## 2022-04-28 PROCEDURE — 85025 COMPLETE CBC W/AUTO DIFF WBC: CPT

## 2022-04-28 PROCEDURE — 80048 BASIC METABOLIC PNL TOTAL CA: CPT

## 2022-04-28 RX ORDER — PROCHLORPERAZINE EDISYLATE 5 MG/ML
10 INJECTION INTRAMUSCULAR; INTRAVENOUS EVERY 6 HOURS PRN
Status: DISCONTINUED | OUTPATIENT
Start: 2022-04-28 | End: 2022-04-29 | Stop reason: HOSPADM

## 2022-04-28 RX ORDER — CYCLOBENZAPRINE HCL 10 MG
10 TABLET ORAL 3 TIMES DAILY PRN
Qty: 12 TABLET | Refills: 0 | Status: CANCELLED | OUTPATIENT
Start: 2022-04-28 | End: 2022-05-08

## 2022-04-28 RX ORDER — BUTALBITAL, ACETAMINOPHEN AND CAFFEINE 300; 40; 50 MG/1; MG/1; MG/1
1 CAPSULE ORAL EVERY 6 HOURS PRN
Qty: 30 CAPSULE | Refills: 1 | Status: CANCELLED | OUTPATIENT
Start: 2022-04-28

## 2022-04-28 RX ORDER — DIPHENHYDRAMINE HYDROCHLORIDE 50 MG/ML
12.5 INJECTION INTRAMUSCULAR; INTRAVENOUS ONCE
Status: COMPLETED | OUTPATIENT
Start: 2022-04-28 | End: 2022-04-28

## 2022-04-28 RX ORDER — 0.9 % SODIUM CHLORIDE 0.9 %
500 INTRAVENOUS SOLUTION INTRAVENOUS ONCE
Status: COMPLETED | OUTPATIENT
Start: 2022-04-28 | End: 2022-04-29

## 2022-04-28 RX ORDER — KETOROLAC TROMETHAMINE 10 MG/1
10 TABLET, FILM COATED ORAL EVERY 6 HOURS PRN
Qty: 4 TABLET | Refills: 0 | Status: CANCELLED | OUTPATIENT
Start: 2022-04-28

## 2022-04-28 RX ADMIN — DIPHENHYDRAMINE HYDROCHLORIDE 12.5 MG: 50 INJECTION, SOLUTION INTRAMUSCULAR; INTRAVENOUS at 22:53

## 2022-04-28 RX ADMIN — PROCHLORPERAZINE EDISYLATE 10 MG: 5 INJECTION INTRAMUSCULAR; INTRAVENOUS at 22:53

## 2022-04-28 RX ADMIN — SODIUM CHLORIDE 500 ML: 9 INJECTION, SOLUTION INTRAVENOUS at 22:59

## 2022-04-28 ASSESSMENT — ENCOUNTER SYMPTOMS
SORE THROAT: 0
EYE REDNESS: 0
ABDOMINAL PAIN: 0
SHORTNESS OF BREATH: 0
RHINORRHEA: 0
PHOTOPHOBIA: 1

## 2022-04-28 ASSESSMENT — PAIN DESCRIPTION - LOCATION
LOCATION: HEAD
LOCATION: HEAD

## 2022-04-28 ASSESSMENT — PAIN SCALES - GENERAL
PAINLEVEL_OUTOF10: 9
PAINLEVEL_OUTOF10: 8

## 2022-04-28 ASSESSMENT — PAIN DESCRIPTION - ONSET
ONSET: ON-GOING
ONSET: ON-GOING

## 2022-04-28 ASSESSMENT — PAIN DESCRIPTION - DESCRIPTORS
DESCRIPTORS: ACHING
DESCRIPTORS: ACHING

## 2022-04-28 ASSESSMENT — PAIN - FUNCTIONAL ASSESSMENT: PAIN_FUNCTIONAL_ASSESSMENT: ACTIVITIES ARE NOT PREVENTED

## 2022-04-28 ASSESSMENT — PAIN DESCRIPTION - ORIENTATION
ORIENTATION: LEFT
ORIENTATION: MID

## 2022-04-28 ASSESSMENT — PAIN DESCRIPTION - FREQUENCY
FREQUENCY: CONTINUOUS
FREQUENCY: CONTINUOUS

## 2022-04-28 ASSESSMENT — PAIN DESCRIPTION - DIRECTION: RADIATING_TOWARDS: TOP OF HEAD

## 2022-04-29 VITALS
SYSTOLIC BLOOD PRESSURE: 162 MMHG | TEMPERATURE: 98.4 F | WEIGHT: 209.44 LBS | DIASTOLIC BLOOD PRESSURE: 72 MMHG | BODY MASS INDEX: 34.85 KG/M2 | OXYGEN SATURATION: 100 % | RESPIRATION RATE: 13 BRPM | HEART RATE: 73 BPM

## 2022-04-29 LAB
ANION GAP SERPL CALCULATED.3IONS-SCNC: 14 MMOL/L (ref 3–16)
BUN BLDV-MCNC: 15 MG/DL (ref 7–20)
CALCIUM SERPL-MCNC: 10.5 MG/DL (ref 8.3–10.6)
CHLORIDE BLD-SCNC: 106 MMOL/L (ref 99–110)
CO2: 23 MMOL/L (ref 21–32)
CREAT SERPL-MCNC: 1.1 MG/DL (ref 0.6–1.2)
GFR AFRICAN AMERICAN: 59
GFR NON-AFRICAN AMERICAN: 49
GLUCOSE BLD-MCNC: 117 MG/DL (ref 70–99)
POTASSIUM REFLEX MAGNESIUM: 3.7 MMOL/L (ref 3.5–5.1)
SODIUM BLD-SCNC: 143 MMOL/L (ref 136–145)

## 2022-04-29 RX ORDER — BUTALBITAL, ACETAMINOPHEN AND CAFFEINE 300; 40; 50 MG/1; MG/1; MG/1
1 CAPSULE ORAL EVERY 6 HOURS PRN
Qty: 30 CAPSULE | Refills: 1 | Status: SHIPPED | OUTPATIENT
Start: 2022-04-29

## 2022-04-29 RX ORDER — KETOROLAC TROMETHAMINE 10 MG/1
10 TABLET, FILM COATED ORAL EVERY 6 HOURS PRN
Qty: 30 TABLET | Refills: 0 | Status: SHIPPED | OUTPATIENT
Start: 2022-04-29

## 2022-04-29 NOTE — ED NOTES
Patient DCed from ED. Discussed with patient AVS, scripts, and follow up. She verbalized understanding. VSS. IV DCed. Reinforced to patient that if symptoms persist or worsen to return to the ED. She verbalizes understanding. Patient ambulated out of ED on her own with a steady gait.       Angel Turner RN  04/29/22 2547

## 2022-04-29 NOTE — ED NOTES
Pt to ED w/ headache that started this morning. Pt took Tylenol with no relief.  Pt is AXOX4, VSS on RA     Addis Vega RN  04/28/22 6766

## 2022-04-29 NOTE — ED PROVIDER NOTES
[facility]  eMERGENCY dEPARTMENT eNCOUnter      Pt Name: Robe Rehman  MRN: [id number]  [place] 1950  Date of evaluation: 4/28/2022  Provider: Spring Walker MD    CHIEF COMPLAINT       Chief Complaint   Patient presents with    Migraine     Pt reports migraine that started this morning and continued to worsen. Pt presents to ED alert and oriented at this time with no signs of distress noted. Pt rates pain as a 8/10. HISTORY OF PRESENT ILLNESS   (Location/Symptom, Timing/Onset,Context/Setting, Quality, Duration, Modifying Factors, Severity)  Note limiting factors. Robe Rehman is a 66 y.o. female who presents to the emergency department planing of headache. 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 Patient states that she used to have headaches when she was younger and they recently come back. Her most recent headache was in March of this year. She underwent a CT of her brain at that time which did not find any acute changes. She denies any fever or chills. No head trauma. No anticoagulant use. No abrupt onset. Her headache started earlier today behind her left ear and slowly work forward and now involves her bilateral frontal area. She denies any nausea or vomiting but does endorse some photophobia. These headaches are similar to ones that she had when she was younger. Not abrupt onset. Not worst headache of life. HPI    NursingNotes were reviewed. REVIEW OF SYSTEMS    (2-9 systems for level 4, 10 or more for level 5)     Review of Systems   Constitutional: Negative for fever. HENT: Negative for rhinorrhea and sore throat. Eyes: Positive for photophobia. Negative for redness. Respiratory: Negative for shortness of breath. Cardiovascular: Negative for chest pain. Gastrointestinal: Negative for abdominal pain. Genitourinary: Negative for flank pain.    Musculoskeletal: Negative for neck pain and neck stiffness. Neurological: Positive for headaches. Hematological: Negative for adenopathy. Psychiatric/Behavioral: Negative for confusion. Except as noted above the remainder of the review of systems was reviewed and negative.        PAST MEDICAL HISTORY     Past Medical History:   Diagnosis Date    Acid reflux     Headache(784.0)     Hyperlipidemia     Hypertension     Osteoarthritis          SURGICALHISTORY       Past Surgical History:   Procedure Laterality Date    ANKLE SURGERY      bone spur removed from achilles tendon  in  [address]      X 2    COLONOSCOPY      HYSTERECTOMY      KNEE ARTHROPLASTY Left 2017    KNEE ARTHROPLASTY Right 2008    KNEE ARTHROSCOPY Bilateral     NERVE BLOCK Right 11/5/2021    RIGHT SUPRASCAPULAR NERVE BLOCK performed by Myles Hutton MD at [address] [facility]  01/23/2017    excision of back mass    PAIN MANAGEMENT PROCEDURE Right 10/29/2021    CERVICAL EPIDURAL STEROID INJECTION - C7-T1 RIGHT performed by Myles Hutton MD at [address] [facility] 7/21/2021    EGD W/ EMR performed by Gina Aguirre MD at [address] [facility] 7/21/2021    ENDOSCOPIC ULTRASOUND WITH ENDOSCOPIC MUCOSAL RESECTION with 3 clips placed performed by Gina Aguirre MD at [address] [facility] 12/3/2021    EGD BIOPSY performed by Gina Aguirre MD at [address] [facility]       Previous Medications    ACETAMINOPHEN (APAP EXTRA STRENGTH) 500 MG TABLET    Take 1 tablet by mouth every 6 hours as needed for Pain    AMLODIPINE (NORVASC) 10 MG TABLET    Take 1 tablet by mouth daily    BUTALBITAL-APAP-CAFFEINE (FIORICET) -40 MG CAPS PER CAPSULE    Take 1 capsule by mouth every 6 hours as needed for Headaches    FAMOTIDINE (PEPCID) 20 MG TABLET    Take 1 tablet by mouth 2 times daily as needed (heartburn)    FEXOFENADINE (ALLEGRA) 180 MG TABLET    Take 180 mg by mouth daily as needed     FLUTICASONE (FLONASE) 50 MCG/ACT NASAL SPRAY    1 spray by Each Nostril route daily as needed     GABAPENTIN (NEURONTIN) 100 MG CAPSULE    Take 100 mg by mouth 3 times daily. KETOROLAC (TORADOL) 10 MG TABLET    Take 1 tablet by mouth every 6 hours as needed for Pain    LABETALOL (NORMODYNE) 100 MG TABLET    Take 1 tablet by mouth every evening    LABETALOL (NORMODYNE) 200 MG TABLET    Take 1 tablet by mouth every morning    OMEPRAZOLE (PRILOSEC) 40 MG DELAYED RELEASE CAPSULE    TAKE ONE CAPSULE BY MOUTH EVERY MORNING BEFORE BREAKFAST    RESTASIS 0.05 % OPHTHALMIC EMULSION    Place 1 drop into both eyes every 12 hours     SIMVASTATIN (ZOCOR) 20 MG TABLET    TAKE ONE TABLET BY MOUTH ONCE NIGHTLY       ALLERGIES     Lisinopril and Tetracyclines & related    FAMILY HISTORY       Family History   Problem Relation Age of Onset    High Blood Pressure Mother     Arthritis Father           SOCIAL HISTORY       Social History     Socioeconomic History    Marital status:      Spouse name: None    Number of children: None    Years of education: None    Highest education level: None   Occupational History    None   Tobacco Use    Smoking status: Never Smoker    Smokeless tobacco: Never Used    Tobacco comment: quit when 29   Vaping Use    Vaping Use: Never used   Substance and Sexual Activity    Alcohol use: No    Drug use: No    Sexual activity: Not Currently   Other Topics Concern    None   Social History Narrative    None     Social Determinants of Health     Financial Resource Strain: Low Risk     Difficulty of Paying Living Expenses: Not hard at all   Food Insecurity: No Food Insecurity    Worried About Running Out of Food in the Last Year: Never true    Anderson of Food in the Last Year: Never true   Transportation Needs:     Lack of Transportation (Medical):  Not on file  Lack of Transportation (Non-Medical): Not on file   Physical Activity:     Days of Exercise per Week: Not on file    Minutes of Exercise per Session: Not on file   Stress:     Feeling of Stress : Not on file   Social Connections:     Frequency of Communication with Friends and Family: Not on file    Frequency of Social Gatherings with Friends and Family: Not on file    Attends Scientologist Services: Not on file    Active Member of [address] [place] [address] or Organizations: Not on file    Attends Club or Organization Meetings: Not on file    Marital Status: Not on file   Intimate Partner Violence:     Fear of Current or Ex-Partner: Not on file    Emotionally Abused: Not on file    Physically Abused: Not on file    Sexually Abused: Not on file   Housing Stability:     Unable to Pay for Housing in the Last Year: Not on file    Number of [place] in the Last Year: Not on file    Unstable Housing in the Last Year: Not on file       SCREENINGS   NIH Stroke Scale  Interval: Baseline  Level of Consciousness (1a): Alert  LOC Questions (1b): Answers both correctly  LOC Commands (1c): Performs both tasks correctly  Best Gaze (2): Normal  Visual (3): No visual loss  Facial Palsy (4): Normal symmetrical movement  Motor Arm, Left (5a): No drift  Motor Arm, Right (5b): No drift  Motor Leg, Left (6a): No drift  Motor Leg, Right (6b): No drift  Limb Ataxia (7): Absent  Sensory (8): Normal  Best Language (9): No aphasia  Dysarthria (10): Normal  Extinction and Inattention (11): No abnormality  Total: 0Glasgow Coma Scale  Eye Opening: Spontaneous  Best Verbal Response: Oriented  Best Motor Response: Obeys commands  [place] Coma Scale Score: 15        PHYSICAL EXAM    (up to 7 for level 4, 8 or more for level 5)     ED Triage Vitals [04/28/22 2215]   BP Temp Temp Source Pulse Resp SpO2 Height Weight   (!) (P) 182/78 (P) 97.5 °F (36.4 °C) (P) Oral (P) 85 (P) 18 -- -- --       Physical Exam  Vitals and nursing note reviewed. Constitutional:       Appearance: She is well-developed. She is not diaphoretic. HENT:      Head: Normocephalic and atraumatic. Mouth/Throat:      Mouth: Mucous membranes are moist.   Eyes:      General:         Right eye: No discharge. Left eye: No discharge. Conjunctiva/sclera: Conjunctivae normal.   Neck:      Comments: No meningismus. No pain with range of motion. Cardiovascular:      Rate and Rhythm: Normal rate. Pulses: Normal pulses. Heart sounds: No murmur heard. Pulmonary:      Effort: Pulmonary effort is normal. No respiratory distress. Breath sounds: Normal breath sounds. No wheezing, rhonchi or rales. Abdominal:      Palpations: Abdomen is soft. Tenderness: There is no abdominal tenderness. Musculoskeletal:         General: Normal range of motion. Cervical back: Neck supple. Skin:     General: Skin is warm and dry. Neurological:      Mental Status: She is alert and oriented to person, place, and time.       Comments: NIHSS 0 - done per nursing   Psychiatric:         Behavior: Behavior normal.         DIAGNOSTIC RESULTS     EKG: All EKG's are interpreted by the Emergency Department Physician who either signs or Co-signsthis chart in the absence of a cardiologist.        RADIOLOGY:   [place] such as CT, Ultrasound and MRI are read by the radiologist. Plain radiographic images are visualized and preliminarily interpreted by the emergency physician with the below findings:        Interpretation per the Radiologist below, if available at the time ofthis note:    No orders to display         ED BEDSIDE ULTRASOUND:   Performed by ED Physician - none    LABS:  Labs Reviewed   CBC WITH AUTO DIFFERENTIAL - Abnormal; Notable for the following components:       Result Value    RBC 3.93 (*)     Hemoglobin 11.9 (*)     All other components within normal limits   BASIC METABOLIC PANEL W/ REFLEX TO MG FOR LOW K - Abnormal; Notable for the following components:    Glucose 117 (*)     GFR Non- 49 (*)     GFR  59 (*)     All other components within normal limits       All other labs were within normal range or not returned as of this dictation. EMERGENCY DEPARTMENT COURSE and DIFFERENTIAL DIAGNOSIS/MDM:   Vitals:    Vitals:    04/28/22 2215 04/28/22 2231 04/28/22 2236 04/28/22 2330   BP: (!) 182/78  (!) 163/67 (!) 157/71   Pulse: 85  74 67   Resp: 18  12 15   Temp: 97.5 °F (36.4 °C)  98.4 °F (36.9 °C)    TempSrc: Oral  Oral    SpO2: 99%  100% 100%   Weight:  209 lb 7 oz (95 kg)           MDM  Number of Diagnoses or Management Options  Acute nonintractable headache, unspecified headache type  Diagnosis management comments: DDX: Migraine headache, mass, other    CT performed one month ago - unremarkable. NIHSS by nursing is zero. Based on the history and physical exam, there is a lack of evidence for meningitis, SAH, or dural sinus thrombosis. While I cannot completely exclude these entities without further workup, the likelihood that any of these disease entities is the cause of patient's headache today is so low that further testing is not justified at this time. I discussed this with the patient and the patient is in agreement that further testing at this time is highly unlikely to be beneficial.  I treated the patient's headache with fluids and medication. On reassessment, the patient's headache is improved. CRITICAL CARE TIME   Total Critical Care time was 0 minutes, excluding separately reportable procedures. There was a high probability of clinically significant/life threatening deterioration in the patient's condition which required my urgent intervention. CONSULTS:  None    PROCEDURES:  Unless otherwise noted below, none     Procedures    FINAL IMPRESSION      1.  Acute nonintractable headache, unspecified headache type          DISPOSITION/PLAN   DISPOSITION Decision To Discharge 04/29/2022 12:27:52 AM      PATIENT REFERRED TO:  Brenda Doran MD  [address] [address]  [phone]    In 1 week  For Blood Pressure recheck    Mabel Felty., MD  [address]  [phone]    Call in 1 day  To discuss your upcoming brain imaging planned and see if they desire to get an MRI      DISCHARGE MEDICATIONS:  New Prescriptions    No medications on file          (Please note that portions of this note were completed with a voice recognition program.Efforts were made to edit the dictations but occasionally words are mis-transcribed.)    Leti Villanueva MD (electronically signed)  Attending Emergency Physician          Leti Villanueva MD  04/29/22 0030

## 2022-05-01 ENCOUNTER — HOSPITAL ENCOUNTER (EMERGENCY)
Age: 72
Discharge: HOME | End: 2022-05-01
Attending: EMERGENCY MEDICINE
Payer: MEDICARE

## 2022-05-01 VITALS
SYSTOLIC BLOOD PRESSURE: 144 MMHG | WEIGHT: 207.67 LBS | TEMPERATURE: 98.7 F | RESPIRATION RATE: 16 BRPM | OXYGEN SATURATION: 100 % | DIASTOLIC BLOOD PRESSURE: 67 MMHG | HEART RATE: 68 BPM | BODY MASS INDEX: 34.6 KG/M2 | HEIGHT: 65 IN

## 2022-05-01 DIAGNOSIS — G44.039 NONINTRACTABLE PAROXYSMAL HEMICRANIA, UNSPECIFIED CHRONICITY PATTERN: Primary | ICD-10-CM

## 2022-05-01 PROCEDURE — 96374 THER/PROPH/DIAG INJ IV PUSH: CPT

## 2022-05-01 PROCEDURE — 96375 TX/PRO/DX INJ NEW DRUG ADDON: CPT

## 2022-05-01 PROCEDURE — 6360000002 HC RX W HCPCS: Performed by: EMERGENCY MEDICINE

## 2022-05-01 PROCEDURE — 99284 EMERGENCY DEPT VISIT MOD MDM: CPT

## 2022-05-01 RX ORDER — KETOROLAC TROMETHAMINE 30 MG/ML
15 INJECTION, SOLUTION INTRAMUSCULAR; INTRAVENOUS ONCE
Status: COMPLETED | OUTPATIENT
Start: 2022-05-01 | End: 2022-05-01

## 2022-05-01 RX ORDER — DIPHENHYDRAMINE HYDROCHLORIDE 50 MG/ML
25 INJECTION INTRAMUSCULAR; INTRAVENOUS ONCE
Status: COMPLETED | OUTPATIENT
Start: 2022-05-01 | End: 2022-05-01

## 2022-05-01 RX ORDER — METOCLOPRAMIDE HYDROCHLORIDE 5 MG/ML
10 INJECTION INTRAMUSCULAR; INTRAVENOUS ONCE
Status: COMPLETED | OUTPATIENT
Start: 2022-05-01 | End: 2022-05-01

## 2022-05-01 RX ADMIN — METOCLOPRAMIDE HYDROCHLORIDE 10 MG: 5 INJECTION INTRAMUSCULAR; INTRAVENOUS at 12:58

## 2022-05-01 RX ADMIN — DIPHENHYDRAMINE HYDROCHLORIDE 25 MG: 50 INJECTION, SOLUTION INTRAMUSCULAR; INTRAVENOUS at 12:54

## 2022-05-01 RX ADMIN — KETOROLAC TROMETHAMINE 15 MG: 30 INJECTION, SOLUTION INTRAMUSCULAR at 12:54

## 2022-05-01 ASSESSMENT — PAIN SCALES - GENERAL
PAINLEVEL_OUTOF10: 6
PAINLEVEL_OUTOF10: 8
PAINLEVEL_OUTOF10: 2

## 2022-05-01 ASSESSMENT — PAIN DESCRIPTION - LOCATION
LOCATION: HEAD
LOCATION: HEAD

## 2022-05-01 NOTE — ED PROVIDER NOTES
[facility]  eMERGENCY dEPARTMENT eNCOUnter      Pt Name: Kareem Campuzano  MRN: [id number]  [place] 1950  Date of evaluation: 5/1/2022  Provider: Dayanara Vergara MD    CHIEF COMPLAINT       Chief Complaint   Patient presents with    Headache     here recently for same. no relief at home. new medication not helping. CRITICAL CARE TIME   Total Critical Care time was 0 minutes, excluding separately reportable procedures. There was a high probability of clinically significant/life threatening deterioration in the patient's condition which required my urgent intervention. HISTORY OF PRESENT ILLNESS  (Location/Symptom, Timing/Onset, Context/Setting, Quality, Duration, Modifying Factors, Severity.)   Kareem Campuzano is a 66 y.o. female who presents to the emergency department complaining of a left-sided headache. She was seen here on 3/19/2022 for headache. She had CT of her head which showed no acute findings. It was treated and she got relief. It came back and she was seen again on 4/28/2022. Since she was discharged this headache is persisted. Her primary care physician is also given her Fioricet. She had tried this along with Tylenol without relief. She has follow-up arranged with her oncologist on the 10th. She has a history of a carcinoid tumor. They ordered an outpatient MRI of her head to be done prior to her visit. Both she and her daughter states she is under a lot of stress. Her  been diagnosed with dementia. Nursing Notes were reviewed and I agree. REVIEW OF SYSTEMS    (2-9 systems for level 4, 10 or more for level 5)     General: No fever or chills. ENT: No nasal congestion sore throat or earache. Cardiovascular: No chest pain. Pulmonary: No shortness of breath or cough. GI: No abdominal pain, nausea, vomiting. Neuro: Left-sided headache as above. Gradual in onset.   Similar to the headache she had when she was younger. No extremity weakness numbness or tingling. No visual deficits. No speech difficulty. Except as noted above the remainder of the review of systems was reviewed and negative.        PAST MEDICAL HISTORY     Past Medical History:   Diagnosis Date    Acid reflux     Headache(784.0)     Hyperlipidemia     Hypertension     Osteoarthritis          SURGICAL HISTORY       Past Surgical History:   Procedure Laterality Date    ANKLE SURGERY      bone spur removed from achilles tendon  in      APPENDECTOMY       SECTION      X 2    COLONOSCOPY      HYSTERECTOMY      KNEE ARTHROPLASTY Left 2017    KNEE ARTHROPLASTY Right 2008    KNEE ARTHROSCOPY Bilateral     NERVE BLOCK Right 2021    RIGHT SUPRASCAPULAR NERVE BLOCK performed by Paulie Euceda MD at [address] [facility]  2017    excision of back mass    PAIN MANAGEMENT PROCEDURE Right 10/29/2021    CERVICAL EPIDURAL STEROID INJECTION - C7-T1 RIGHT performed by Paulie Euceda MD at [address] [facility] 2021    EGD W/ EMR performed by Anastacia Tse MD at [address] [facility] 2021    ENDOSCOPIC ULTRASOUND WITH ENDOSCOPIC MUCOSAL RESECTION with 3 clips placed performed by Anastacia Tse MD at [address] [facility] 12/3/2021    EGD BIOPSY performed by Anastacia Tse MD at [facility]. [address]       Previous Medications    ACETAMINOPHEN (APAP EXTRA STRENGTH) 500 MG TABLET    Take 1 tablet by mouth every 6 hours as needed for Pain    AMLODIPINE (NORVASC) 10 MG TABLET    Take 1 tablet by mouth daily    BUTALBITAL-APAP-CAFFEINE (FIORICET) -40 MG CAPS PER CAPSULE    Take 1 capsule by mouth every 6 hours as needed for Headaches    FAMOTIDINE (PEPCID) 20 MG TABLET    Take 1 tablet by mouth 2 times daily as needed (heartburn)    FEXOFENADINE (ALLEGRA) 180 MG TABLET    Take 180 mg by mouth daily as needed     FLUTICASONE (FLONASE) 50 MCG/ACT NASAL SPRAY    1 spray by Each Nostril route daily as needed     GABAPENTIN (NEURONTIN) 100 MG CAPSULE    Take 100 mg by mouth 3 times daily. KETOROLAC (TORADOL) 10 MG TABLET    Take 1 tablet by mouth every 6 hours as needed for Pain    LABETALOL (NORMODYNE) 100 MG TABLET    Take 1 tablet by mouth every evening    LABETALOL (NORMODYNE) 200 MG TABLET    Take 1 tablet by mouth every morning    OMEPRAZOLE (PRILOSEC) 40 MG DELAYED RELEASE CAPSULE    TAKE ONE CAPSULE BY MOUTH EVERY MORNING BEFORE BREAKFAST    RESTASIS 0.05 % OPHTHALMIC EMULSION    Place 1 drop into both eyes every 12 hours     SIMVASTATIN (ZOCOR) 20 MG TABLET    TAKE ONE TABLET BY MOUTH ONCE NIGHTLY       ALLERGIES     Lisinopril and Tetracyclines & related    FAMILY HISTORY       Family History   Problem Relation Age of Onset    High Blood Pressure Mother     Arthritis Father           SOCIAL HISTORY       Social History     Socioeconomic History    Marital status:      Spouse name: Not on file    Number of children: Not on file    Years of education: Not on file    Highest education level: Not on file   Occupational History    Not on file   Tobacco Use    Smoking status: Never Smoker    Smokeless tobacco: Never Used    Tobacco comment: quit when 29   Vaping Use    Vaping Use: Never used   Substance and Sexual Activity    Alcohol use: No    Drug use: No    Sexual activity: Not Currently   Other Topics Concern    Not on file   Social History Narrative    Not on file     Social Determinants of Health     Financial Resource Strain: Low Risk     Difficulty of Paying Living Expenses: Not hard at all   Food Insecurity: No Food Insecurity    Worried About Running Out of Food in the Last Year: Never true    Anderson of Food in the Last Year: Never true   Transportation Needs:     Lack of Transportation (Medical):  Not on file    Lack of Transportation (Non-Medical): Not on file   Physical Activity:     Days of Exercise per Week: Not on file    Minutes of Exercise per Session: Not on file   Stress:     Feeling of Stress : Not on file   Social Connections:     Frequency of Communication with Friends and Family: Not on file    Frequency of Social Gatherings with Friends and Family: Not on file    Attends [employer] Services: Not on file    Active Member of [address] [place] [address] or Organizations: Not on file    Attends Club or Organization Meetings: Not on file    Marital Status: Not on file   Intimate Partner Violence:     Fear of Current or Ex-Partner: Not on file    Emotionally Abused: Not on file    Physically Abused: Not on file    Sexually Abused: Not on file   Housing Stability:     Unable to Pay for Housing in the Last Year: Not on file    Number of [place] in the Last Year: Not on file    Unstable Housing in the Last Year: Not on file         PHYSICAL EXAM    (up to 7 for level 4, 8 or more for level 5)     ED Triage Vitals [05/01/22 1119]   BP Temp Temp Source Pulse Resp SpO2 Height Weight   (!) 178/66 98.9 °F (37.2 °C) Oral 72 15 100 % 5' 5\" (1.651 m) 207 lb 10.8 oz (94.2 kg)       General: Alert black female no acute distress. Head: Atraumatic and normocephalic. Eyes: No conjunctival injection. No pallor. Pupils equal round reactive. Extraocular movements are intact. ENT: TMs normal.  Nose clear. Oropharynx is moist without erythema. Neck: Supple without adenopathy, nontender. Heart: Regular rate and rhythm. No murmurs or gallops noted. Lungs: Breath sounds equal bilaterally and clear. Abdomen: Soft, nondistended, nontender. Skin: Warm and dry, good turgor. No pallor or cyanosis. No diaphoresis. Neuro: Awake, alert, oriented. Symmetrical reactive pupils. Intact extraocular movements. No photophobia. No nystagmus. No facial asymmetry. Symmetrical motor function upper and lower extremities. No drift.   No limb ataxia. Normal gait. Her speech is clear and understandable. No dysarthria or expressive aphasia. No visual deficits. DIFFERENTIAL DIAGNOSIS   Differential includes but is not limited to tumor, subarachnoid hemorrhage, migraine variant, tension headache, other. DIAGNOSTIC RESULTS     EKG: All EKG's are interpreted by Matthias Parikh MD in the absence of a cardiologist.      RADIOLOGY:   Non-plain film images such as CT, Ultrasound and MRI are read by the radiologist. Plain radiographic images are visualized and preliminarily interpreted Matthias Parikh MD with the below findings:      Interpretation per the Radiologist below, if available at the time of this note:    No orders to display         ED BEDSIDE ULTRASOUND:   Performed by ED Physician - none    LABS:  Labs Reviewed - No data to display    All other labs were within normal range or not returned as of this dictation. EMERGENCY DEPARTMENT COURSE and DIFFERENTIAL DIAGNOSIS/MDM:   Vitals:    Vitals:    05/01/22 1119 05/01/22 1130 05/01/22 1200   BP: (!) 178/66 (!) 147/85 (!) 146/68   Pulse: 72  68   Resp: 15     Temp: 98.9 °F (37.2 °C)     TempSrc: Oral     SpO2: 100%     Weight: 207 lb 10.8 oz (94.2 kg)     Height: 5' 5\" (1.651 m)         This patient presents with a headache as described above. As noted above she has had a previous history of headaches when she was younger. She has recently had some recurrent headaches. She has had a negative CT of the head. She is already been in contact with Dr. Jennifer Bob. She has an outpatient MRI scheduled. Her headache from her left visit never really completely resolved. She was medicated here with Reglan, Benadryl, and Toradol. She had significant relief of her headache. I do not think any further imaging is indicated today. A repeat CT scan is not indicated.   I think she can be discharged with outpatient follow-up and continue with her scheduled MRI and follow-up with her PCP and/or Dr. Radha Mena. I have a low index of suspicion for intracerebral hemorrhage, subarachnoid hemorrhage. Her headache was gradual in onset. CONSULTS:  None    PROCEDURES:  None    FINAL IMPRESSION      1.  Nonintractable paroxysmal hemicrania, unspecified chronicity pattern          DISPOSITION/PLAN   DISPOSITION        PATIENT REFERRED TO:  Wilfred Ardon MD  [address] [address]  [phone]    In 3 days        DISCHARGE MEDICATIONS:  New Prescriptions    No medications on file       (Please note that portions of this note were completed with a voice recognition program.  Efforts were made to edit the dictations but occasionally words are mis-transcribed.)    Valeria Harris MD  Attending Emergency Physician        Rizwan Santana MD  05/01/22 Rob Ponce MD  05/01/22 [id number]

## 2022-05-01 NOTE — ED TRIAGE NOTES
Pt admits self to ER with complaint of headache. Onset was 4 days prior. Were seen on 4/22 with no relief from medication. Headache located in left occipital, describes a gripping and sharp pain, rates pain 8/10. Denies chest pain, SOB, nausea, dizziness or vision disturbances.

## 2022-05-01 NOTE — ED NOTES
D/C: Order noted for d/c. Pt confirmed d/c paperwork   have correct name. Discharge and education instructions reviewed with patient. Teach-back successful. Pt verbalized understanding and signed d/c papers. Pt denied questions at this time. No acute distress noted. Patient instructed to follow-up as noted - return to emergency department if symptoms worsen. Patient verbalized understanding. Discharged per EDMD with discharge instructions. Pt discharged per self with family member to private vehicle. Patient stable upon departure. Thanked patient for choosing [facility]) for care.           Jo Ann Bowser RN  05/01/22 4402

## 2022-05-02 ENCOUNTER — TELEPHONE (OUTPATIENT)
Dept: ADMINISTRATIVE | Age: 72
End: 2022-05-02

## 2022-05-02 NOTE — TELEPHONE ENCOUNTER
Submitted PA for Ketorolac Tromethamine 10MG tablets Via CMM Key: [id number] STATUS: APPROVED effective 01/01/2022 - 12/31/2022    Please notify patient.  Thank you

## 2022-05-05 ENCOUNTER — HOSPITAL ENCOUNTER (OUTPATIENT)
Dept: MRI IMAGING | Age: 72
Discharge: HOME | End: 2022-05-05
Payer: MEDICARE

## 2022-05-05 ENCOUNTER — HOSPITAL ENCOUNTER (OUTPATIENT)
Dept: CT IMAGING | Age: 72
Discharge: HOME | End: 2022-05-05
Payer: MEDICARE

## 2022-05-05 DIAGNOSIS — C7A.010 MALIGNANT CARCINOID TUMOR OF THE DUODENUM (HCC): ICD-10-CM

## 2022-05-05 PROCEDURE — 6360000004 HC RX CONTRAST MEDICATION: Performed by: INTERNAL MEDICINE

## 2022-05-05 PROCEDURE — 74177 CT ABD & PELVIS W/CONTRAST: CPT

## 2022-05-05 PROCEDURE — 70553 MRI BRAIN STEM W/O & W/DYE: CPT

## 2022-05-05 PROCEDURE — A9579 GAD-BASE MR CONTRAST NOS,1ML: HCPCS | Performed by: INTERNAL MEDICINE

## 2022-05-05 RX ADMIN — GADOTERIDOL 20 ML: 279.3 INJECTION, SOLUTION INTRAVENOUS at 13:45

## 2022-05-05 RX ADMIN — IOPAMIDOL 80 ML: 755 INJECTION, SOLUTION INTRAVENOUS at 15:40

## 2022-05-05 RX ADMIN — IOHEXOL 50 ML: 240 INJECTION, SOLUTION INTRATHECAL; INTRAVASCULAR; INTRAVENOUS; ORAL at 15:40

## 2022-06-01 RX ORDER — SIMVASTATIN 20 MG
TABLET ORAL
Qty: 90 TABLET | Refills: 0 | Status: SHIPPED | OUTPATIENT
Start: 2022-06-01 | End: 2022-06-01

## 2022-06-01 RX ORDER — OMEPRAZOLE 40 MG/1
CAPSULE, DELAYED RELEASE ORAL
Qty: 60 CAPSULE | Refills: 1 | Status: SHIPPED | OUTPATIENT
Start: 2022-06-01

## 2022-06-01 RX ORDER — OMEPRAZOLE 40 MG/1
CAPSULE, DELAYED RELEASE ORAL
Qty: 60 CAPSULE | Refills: 1 | Status: SHIPPED | OUTPATIENT
Start: 2022-06-01 | End: 2022-06-01

## 2022-06-01 RX ORDER — SIMVASTATIN 20 MG
TABLET ORAL
Qty: 90 TABLET | Refills: 0 | Status: SHIPPED | OUTPATIENT
Start: 2022-06-01

## 2022-06-01 NOTE — TELEPHONE ENCOUNTER
Medication:   Requested Prescriptions     Pending Prescriptions Disp Refills    omeprazole (PRILOSEC) 40 MG delayed release capsule [Pharmacy Med Name: OMEPRAZOLE DR 40 MG CAPSULE] 60 capsule      Sig: TAKE ONE CAPSULE BY MOUTH EVERY MORNING BEFORE BREAKFAST    simvastatin (ZOCOR) 20 MG tablet [Pharmacy Med Name: SIMVASTATIN 20 MG TABLET] 90 tablet 0     Sig: TAKE ONE TABLET BY MOUTH ONCE NIGHTLY        Last Filled:      Patient Phone Number: [phone] (home)     Last appt: 4/22/2022   Next appt: 7/22/2022    Last OARRS:   RX Monitoring 4/16/2019   Attestation The Prescription Monitoring Report for this patient was reviewed today.    Periodic Controlled Substance Monitoring -

## 2022-06-01 NOTE — TELEPHONE ENCOUNTER
Medication:   Requested Prescriptions     Pending Prescriptions Disp Refills    simvastatin (ZOCOR) 20 MG tablet [Pharmacy Med Name: SIMVASTATIN 20 MG TABLET] 90 tablet 0     Sig: TAKE ONE TABLET BY MOUTH ONCE NIGHTLY    omeprazole (PRILOSEC) 40 MG delayed release capsule [Pharmacy Med Name: OMEPRAZOLE DR 40 MG CAPSULE] 60 capsule 1     Sig: TAKE ONE CAPSULE BY MOUTH EVERY MORNING BEFORE BREAKFAST       Last Filled:      Patient Phone Number: [phone] (home)     Last appt: 4/22/2022   Next appt: 7/22/2022    Last Lipid:   Lab Results   Component Value Date    CHOL 198 01/06/2022    TRIG 107 01/06/2022    HDL 62 01/06/2022    HDL 51 09/23/2011    LDLCALC 115 01/06/2022

## 2022-07-22 ENCOUNTER — OFFICE VISIT (OUTPATIENT)
Dept: PRIMARY CARE CLINIC | Age: 72
End: 2022-07-22
Payer: MEDICARE

## 2022-07-22 VITALS
OXYGEN SATURATION: 100 % | WEIGHT: 196.4 LBS | HEART RATE: 67 BPM | DIASTOLIC BLOOD PRESSURE: 70 MMHG | TEMPERATURE: 97 F | HEIGHT: 65 IN | BODY MASS INDEX: 32.72 KG/M2 | SYSTOLIC BLOOD PRESSURE: 130 MMHG

## 2022-07-22 DIAGNOSIS — Z12.31 ENCOUNTER FOR SCREENING MAMMOGRAM FOR HIGH-RISK PATIENT: ICD-10-CM

## 2022-07-22 DIAGNOSIS — R73.03 PREDIABETES: ICD-10-CM

## 2022-07-22 DIAGNOSIS — Z00.00 MEDICARE ANNUAL WELLNESS VISIT, SUBSEQUENT: Primary | ICD-10-CM

## 2022-07-22 DIAGNOSIS — I10 ESSENTIAL HYPERTENSION: ICD-10-CM

## 2022-07-22 PROBLEM — N18.30 CHRONIC RENAL DISEASE, STAGE III (HCC): Status: RESOLVED | Noted: 2022-04-22 | Resolved: 2022-07-22

## 2022-07-22 LAB — HBA1C MFR BLD: 6 %

## 2022-07-22 PROCEDURE — 83036 HEMOGLOBIN GLYCOSYLATED A1C: CPT | Performed by: INTERNAL MEDICINE

## 2022-07-22 PROCEDURE — 99213 OFFICE O/P EST LOW 20 MIN: CPT | Performed by: INTERNAL MEDICINE

## 2022-07-22 PROCEDURE — 1123F ACP DISCUSS/DSCN MKR DOCD: CPT | Performed by: INTERNAL MEDICINE

## 2022-07-22 PROCEDURE — G0439 PPPS, SUBSEQ VISIT: HCPCS | Performed by: INTERNAL MEDICINE

## 2022-07-22 SDOH — ECONOMIC STABILITY: FOOD INSECURITY: WITHIN THE PAST 12 MONTHS, YOU WORRIED THAT YOUR FOOD WOULD RUN OUT BEFORE YOU GOT MONEY TO BUY MORE.: NEVER TRUE

## 2022-07-22 SDOH — ECONOMIC STABILITY: FOOD INSECURITY: WITHIN THE PAST 12 MONTHS, THE FOOD YOU BOUGHT JUST DIDN'T LAST AND YOU DIDN'T HAVE MONEY TO GET MORE.: NEVER TRUE

## 2022-07-22 ASSESSMENT — ENCOUNTER SYMPTOMS
EYES NEGATIVE: 1
GASTROINTESTINAL NEGATIVE: 1
CHEST TIGHTNESS: 0
ABDOMINAL PAIN: 0
ALLERGIC/IMMUNOLOGIC NEGATIVE: 1
ABDOMINAL DISTENTION: 0
COUGH: 0
SHORTNESS OF BREATH: 0
WHEEZING: 0

## 2022-07-22 ASSESSMENT — PATIENT HEALTH QUESTIONNAIRE - PHQ9
SUM OF ALL RESPONSES TO PHQ QUESTIONS 1-9: 0
1. LITTLE INTEREST OR PLEASURE IN DOING THINGS: 0
SUM OF ALL RESPONSES TO PHQ QUESTIONS 1-9: 0
2. FEELING DOWN, DEPRESSED OR HOPELESS: 0
SUM OF ALL RESPONSES TO PHQ QUESTIONS 1-9: 0
SUM OF ALL RESPONSES TO PHQ QUESTIONS 1-9: 0
SUM OF ALL RESPONSES TO PHQ9 QUESTIONS 1 & 2: 0

## 2022-07-22 ASSESSMENT — SOCIAL DETERMINANTS OF HEALTH (SDOH): HOW HARD IS IT FOR YOU TO PAY FOR THE VERY BASICS LIKE FOOD, HOUSING, MEDICAL CARE, AND HEATING?: NOT HARD AT ALL

## 2022-07-22 ASSESSMENT — LIFESTYLE VARIABLES: HOW OFTEN DO YOU HAVE A DRINK CONTAINING ALCOHOL: NEVER

## 2022-07-22 NOTE — PROGRESS NOTES
Subjective:      Patient ID: Kerry Alba is a 66 y.o. female. 7/22/2022 Patient presents with:  Medicare AWV  Hypertension              Last seen  4/22/2022 Patient presents with: Other: follow up (BP)    Exercising reg . Not needing xanax             3/28/2022 Patient presents with:  Hypertension  Headache  Depression: with anxiety . Concerned about wt gain                 1/6/2022 Patient presents with:  Hypertension  Cannot take pm dose of labetalol 200 ! Taking it in am + Norvasc   Abdominal Pain: no pain as such ; more back pain   . 1 month f/u Had repeat biopsy by Dr Vinh Solomon 12/21 ; Nl pathology . CT abd / chest  8/21 done by Dr Zain Moses ; advised f/u in a yr     Has questions anoout Omega 3 . Been off it for a while     Ch back pain ; C/O Ortho . Got steroid shots in back . Unable to exercise much b/e pain             Last seen  8/13/2021 Patient presents with:  Hypertension . NOT taking higher dose of Labetalol as prescribed at last visit . States readings are fine at home! 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 C/o  Dr Keren Burdick             7/7/2021 Patient presents with:  Medicare AWV  Hypertension  Stress: because of                 Last seen   1/7/2021 Patient presents with:  Hypertension  Generally well   No falls   Stressed with 's personality issues! Managing with reg daily exercise              6/30/2020   Medicare AWV          12/19/2019        10/15/19   Rupture injury / ankle while on Treadmill . C/O Podiatrist         3/26/19     1/29/19      9/18/18       8/15/18         7/26/18 Hypertension: stressed b/e of  'not giving me space '  Headache                      Review of Systems   Constitutional:  Negative for activity change, appetite change, fatigue, fever and unexpected weight change. Flu  Vac 10/21    tdap  10/16    Zostavac 2013 / [place] .   Shingrex 10/19    Pneumonia vac   5/19 ; Cervical back: No rigidity. Right lower leg: No edema (ankles). Left lower leg: No edema (ankles). Skin:     General: Skin is warm. Neurological:      Mental Status: She is oriented to person, place, and time. Psychiatric:         Mood and Affect: Mood normal.         Speech: Speech normal.      Comments: Feeling much better since last visit        Assessment:   Julian Lewis was seen today for medicare awv and hypertension. Diagnoses and all orders for this visit:    Medicare annual wellness visit, subsequent    Prediabetes A1C 6.0 . Cont low carb diet + exercise reg   -     POCT glycosylated hemoglobin (Hb A1C)    Encounter for screening mammogram for high-risk patient   Due now       Essential hypertension  Controlled with higher dose Norvasc   -     amLODIPine (NORVASC) 10 MG tablet; Take 1 tablet by mouth daily  -     labetalol (NORMODYNE) 200 MG tablet; Take 1 tablet by mouth every morning  -     labetalol (NORMODYNE) 100 MG tablet; Take 1 tablet by mouth every evening        Other hyperlipidemia  On Zocor 20 . Off Omega 3 .   -   Neuro-endocrine cancer ([facility] [address] S/P resection 7/21/21   C/O Dr Jessica Fink and Dr Nate Todd . Clear for now         Gastroesophageal reflux disease without esophagitis on PPI . Pepcid for brkthru symptoms  -                             Plan:     Medicare Annual Wellness Visit    Bessie Norton is here for Medicare AWV and Hypertension    Assessment & Plan    Recommendations for Preventive Services Due: see orders and patient instructions/AVS.  Recommended screening schedule for the next 5-10 years is provided to the patient in written form: see Patient Instructions/AVS.     Return in about 6 months (around 1/22/2023), or if symptoms worsen or fail to improve. Subjective       Patient's complete Health Risk Assessment and screening values have been reviewed and are found in Flowsheets.  The following problems were reviewed today and where indicated follow up appointments were made and/or referrals ordered. Positive Risk Factor Screenings with Interventions:             General Health and ACP:  General  In general, how would you say your health is?: Very Good  In the past 7 days, have you experienced any of the following: New or Increased Pain, New or Increased Fatigue, Loneliness, Social Isolation, Stress or Anger?: No  Do you get the social and emotional support that you need?: Yes  Do you have a Living Will?: Yes    Advance Directives       Power of  Living Will ACP-Advance Directive ACP-Power of     Not on File Not on File Not on File Not on File        General Health Risk Interventions:  Feeling better over all    Health Habits/Nutrition:  Physical Activity: Sufficiently Active    Days of Exercise per Week: 7 days    Minutes of Exercise per Session: 60 min     Have you lost any weight without trying in the past 3 months?: No  Body mass index: (!) 32.68  Have you seen the dentist within the past year?: Yes  Health Habits/Nutrition Interventions:  Off breads . On Vegetarian diet . Objective   Vitals:    07/22/22 0845 07/22/22 0900   BP: (!) 142/74 130/70   Pulse: 67    Temp: 97 °F (36.1 °C)    TempSrc: Temporal    SpO2: 100%    Weight: 196 lb 6.4 oz (89.1 kg)    Height: 5' 5\" (1.651 m)       Body mass index is 32.68 kg/m². Allergies   Allergen Reactions    Lisinopril Swelling and Angioedema    Tetracyclines & Related Itching and Rash     Bumps in mouth and inside of hands     Prior to Visit Medications    Medication Sig Taking?  Authorizing Provider   simvastatin (ZOCOR) 20 MG tablet TAKE ONE TABLET BY MOUTH ONCE NIGHTLY Yes Kendy De La Cruz MD   omeprazole (PRILOSEC) 40 MG delayed release capsule TAKE ONE CAPSULE BY MOUTH EVERY MORNING BEFORE BREAKFAST Yes Kendy De La Cruz MD   butalbital-APAP-caffeine (FIORICET) -40 MG CAPS per capsule Take 1 capsule by mouth every 6 hours as needed for Headaches Yes Idania  MD Omar   ketorolac (TORADOL) 10 MG tablet Take 1 tablet by mouth every 6 hours as needed for Pain Yes Beatriz Moralez MD   amLODIPine (NORVASC) 10 MG tablet Take 1 tablet by mouth daily Yes Beatriz Moralez MD   labetalol (NORMODYNE) 200 MG tablet Take 1 tablet by mouth every morning Yes Beatriz Moralez MD   labetalol (NORMODYNE) 100 MG tablet Take 1 tablet by mouth every evening Yes Beatriz Moralez MD   gabapentin (NEURONTIN) 100 MG capsule Take 100 mg by mouth 3 times daily.  Yes Historical Provider, MD   famotidine (PEPCID) 20 MG tablet Take 1 tablet by mouth 2 times daily as needed (heartburn) Yes NERY Mathis   RESTASIS 0.05 % ophthalmic emulsion Place 1 drop into both eyes every 12 hours  Yes Historical Provider, MD   fexofenadine (ALLEGRA) 180 MG tablet Take 180 mg by mouth daily as needed  Yes Historical Provider, MD   fluticasone (FLONASE) 50 MCG/ACT nasal spray 1 spray by Each Nostril route daily as needed  Yes Historical Provider, MD   acetaminophen (APAP EXTRA STRENGTH) 500 MG tablet Take 1 tablet by mouth every 6 hours as needed for Pain Yes Beatriz Moralez MD       [facility] (Including outside providers/suppliers regularly involved in providing care):   Patient Care Team:  Beatriz Moralez MD as PCP - General (Internal Medicine)  Beatriz Moralez MD as PCP - REHABILITATION [facility] Empaneled Provider     Reviewed and updated this visit:  Tobacco  Allergies  Meds  Med Hx  Surg Hx  Soc Hx  Fam Hx

## 2022-07-22 NOTE — PATIENT INSTRUCTIONS
Personalized Preventive Plan for Karl Aquino - 7/22/2022  Medicare offers a range of preventive health benefits. Some of the tests and screenings are paid in full while other may be subject to a deductible, co-insurance, and/or copay. Some of these benefits include a comprehensive review of your medical history including lifestyle, illnesses that may run in your family, and various assessments and screenings as appropriate. After reviewing your medical record and screening and assessments performed today your provider may have ordered immunizations, labs, imaging, and/or referrals for you. A list of these orders (if applicable) as well as your Preventive Care list are included within your After Visit Summary for your review. Other Preventive Recommendations:    A preventive eye exam performed by an eye specialist is recommended every 1-2 years to screen for glaucoma; cataracts, macular degeneration, and other eye disorders. A preventive dental visit is recommended every 6 months. Try to get at least 150 minutes of exercise per week or 10,000 steps per day on a pedometer . Order or download the FREE \"Exercise & Physical Activity: Your Everyday Guide\" from [employer]. Call 5-588.643.4964 or search [employer] online. You need 8951-0250 mg of calcium and 9210-7050 IU of vitamin D per day. It is possible to meet your calcium requirement with diet alone, but a vitamin D supplement is usually necessary to meet this goal.  When exposed to the sun, use a sunscreen that protects against both UVA and UVB radiation with an SPF of 30 or greater. Reapply every 2 to 3 hours or after sweating, drying off with a towel, or swimming. Always wear a seat belt when traveling in a car. Always wear a helmet when riding a bicycle or motorcycle.

## 2022-10-21 ENCOUNTER — NURSE ONLY (OUTPATIENT)
Dept: PRIMARY CARE CLINIC | Age: 72
End: 2022-10-21
Payer: MEDICARE

## 2022-10-21 DIAGNOSIS — Z23 NEED FOR INFLUENZA VACCINATION: Primary | ICD-10-CM

## 2022-10-21 PROCEDURE — G0008 ADMIN INFLUENZA VIRUS VAC: HCPCS | Performed by: INTERNAL MEDICINE

## 2022-10-21 PROCEDURE — 90694 VACC AIIV4 NO PRSRV 0.5ML IM: CPT | Performed by: INTERNAL MEDICINE

## 2022-10-21 NOTE — PROGRESS NOTES
After obtaining consent, and per orders of Dr. Desirae England, injection of high dose flu given in Left arm by Cr Dinero MA. Patient instructed to remain in clinic for 20 minutes afterwards, and to report any adverse reaction to me immediately.

## 2022-11-30 ENCOUNTER — HOSPITAL ENCOUNTER (EMERGENCY)
Age: 72
Discharge: HOME | End: 2022-11-30
Attending: EMERGENCY MEDICINE
Payer: MEDICARE

## 2022-11-30 ENCOUNTER — APPOINTMENT (OUTPATIENT)
Dept: GENERAL RADIOLOGY | Age: 72
End: 2022-11-30
Payer: MEDICARE

## 2022-11-30 VITALS
HEART RATE: 89 BPM | DIASTOLIC BLOOD PRESSURE: 88 MMHG | RESPIRATION RATE: 20 BRPM | SYSTOLIC BLOOD PRESSURE: 144 MMHG | TEMPERATURE: 100.8 F | BODY MASS INDEX: 32.54 KG/M2 | OXYGEN SATURATION: 100 % | WEIGHT: 195.55 LBS

## 2022-11-30 DIAGNOSIS — B34.9 VIRAL SYNDROME: Primary | ICD-10-CM

## 2022-11-30 DIAGNOSIS — R50.9 FEVER IN ADULT: ICD-10-CM

## 2022-11-30 LAB
BACTERIA: NORMAL /HPF
BILIRUBIN URINE: NEGATIVE
BLOOD, URINE: NEGATIVE
CLARITY: CLEAR
COLOR: YELLOW
EPITHELIAL CELLS, UA: 0 /HPF (ref 0–5)
GLUCOSE URINE: NEGATIVE MG/DL
HYALINE CASTS: 0 /LPF (ref 0–8)
KETONES, URINE: NEGATIVE MG/DL
LEUKOCYTE ESTERASE, URINE: NEGATIVE
MICROSCOPIC EXAMINATION: YES
NITRITE, URINE: NEGATIVE
PH UA: 7 (ref 5–8)
PROTEIN UA: ABNORMAL MG/DL
RAPID INFLUENZA  B AGN: NEGATIVE
RAPID INFLUENZA A AGN: NEGATIVE
RBC UA: 2 /HPF (ref 0–4)
SARS-COV-2, NAAT: NOT DETECTED
SPECIFIC GRAVITY UA: 1.01 (ref 1–1.03)
URINE REFLEX TO CULTURE: ABNORMAL
URINE TYPE: ABNORMAL
UROBILINOGEN, URINE: 0.2 E.U./DL
WBC UA: 0 /HPF (ref 0–5)

## 2022-11-30 PROCEDURE — 87804 INFLUENZA ASSAY W/OPTIC: CPT

## 2022-11-30 PROCEDURE — 81001 URINALYSIS AUTO W/SCOPE: CPT

## 2022-11-30 PROCEDURE — 87635 SARS-COV-2 COVID-19 AMP PRB: CPT

## 2022-11-30 PROCEDURE — 6370000000 HC RX 637 (ALT 250 FOR IP): Performed by: EMERGENCY MEDICINE

## 2022-11-30 PROCEDURE — 99284 EMERGENCY DEPT VISIT MOD MDM: CPT

## 2022-11-30 PROCEDURE — 71046 X-RAY EXAM CHEST 2 VIEWS: CPT

## 2022-11-30 RX ORDER — ACETAMINOPHEN 325 MG/1
650 TABLET ORAL ONCE
Status: COMPLETED | OUTPATIENT
Start: 2022-11-30 | End: 2022-11-30

## 2022-11-30 RX ORDER — ONDANSETRON 4 MG/1
4 TABLET, FILM COATED ORAL EVERY 8 HOURS PRN
Qty: 20 TABLET | Refills: 0 | Status: SHIPPED | OUTPATIENT
Start: 2022-11-30

## 2022-11-30 RX ADMIN — ACETAMINOPHEN 650 MG: 500 TABLET ORAL at 14:00

## 2022-11-30 ASSESSMENT — PAIN - FUNCTIONAL ASSESSMENT: PAIN_FUNCTIONAL_ASSESSMENT: NONE - DENIES PAIN

## 2022-11-30 ASSESSMENT — PAIN SCALES - GENERAL: PAINLEVEL_OUTOF10: 0

## 2022-11-30 NOTE — ED PROVIDER NOTES
CHIEF COMPLAINT  Influenza (Pt having flu like symptoms since yesterday )      HISTORY OF PRESENT ILLNESS  Jordan Brewer is a 67 y.o. female who  has a past medical history of Acid reflux, Headache(784.0), Hyperlipidemia, Hypertension, and Osteoarthritis. presents to the ED complaining of flulike symptoms that have been present over the past day and a half. Patient states he went to bed last night developed chills and general body aches. Patient denies any associated cough. States he does have a sore throat. Denies any difficulty swallowing tolerating her secretions. No associated chest pain or shortness of breath. Denies any abdominal pain. Occasionally has nausea but denies any vomiting. States he is having normal urinary habits. Normal bowel movements. Denies any known sick contacts. No other complaints, modifying factors or associated symptoms. Pt was seen during the [place] 19 pandemic. Appropriate PPE worn by ME during patient encounters. Patient was cared for during a time with constrained hospital bed capacity with nationwide stress on resources and staffing. Nursing notes reviewed.    Past Medical History:   Diagnosis Date    Acid reflux     Headache(784.0)     Hyperlipidemia     Hypertension     Osteoarthritis      Past Surgical History:   Procedure Laterality Date    ANKLE SURGERY      bone spur removed from achilles tendon  in  [address]      X 2    COLONOSCOPY      HYSTERECTOMY (CERVIX STATUS UNKNOWN)      KNEE ARTHROPLASTY Left 2017    KNEE ARTHROPLASTY Right 2008    KNEE ARTHROSCOPY Bilateral     NERVE BLOCK Right 11/5/2021    RIGHT SUPRASCAPULAR NERVE BLOCK performed by Kenneth Balderrama MD at [facility]  01/23/2017    excision of back mass    PAIN MANAGEMENT PROCEDURE Right 10/29/2021    CERVICAL EPIDURAL STEROID INJECTION - C7-T1 RIGHT performed by Kenneth Balderrama MD at [place] [address] GASTROINTESTINAL ENDOSCOPY N/A 7/21/2021    EGD W/ EMR [facility] Taw[facility]affer [facility]  N/A 7/21/2021    ENDOSCOPIC ULTRASOUND WITH ENDOSCOPIC MUCOSAL RESECTION with 3 clips placed performed by Tawana Shaffer MD at [address] N/A 12/3/2021    EGD BIOPSY performed by Tawana Shaffer MD at [address] History   Problem Relation Age of Onset    High Blood Pressure Mother     Arthritis Father      Social History     Socioeconomic History    Marital status:      Spouse name: Not on file    Number of children: Not on file    Years of education: Not on file    Highest education level: Not on file   Occupational History    Not on file   Tobacco Use    Smoking status: Never    Smokeless tobacco: Never    Tobacco comments:     quit when 29   Vaping Use    Vaping Use: Never used   Substance and Sexual Activity    Alcohol use: No    Drug use: No    Sexual activity: Not Currently   Other Topics Concern    Not on file   Social History Narrative    Not on file     Social Determinants of Health     Financial Resource Strain: Low Risk     Difficulty of Paying Living Expenses: Not hard at all   Food Insecurity: No Food Insecurity    Worried About Running Out of Food in the Last Year: Never true    Anderson of Food in the Last Year: Never true   Transportation Needs: Not on file   Physical Activity: Sufficiently Active    Days of Exercise per Week: 7 days    Minutes of Exercise per Session: 60 min   Stress: Not on file   Social Connections: Not on file   Intimate Partner Violence: Not on file   Housing Stability: Not on file     No current facility-administered medications for this encounter.      Current Outpatient Medications   Medication Sig Dispense Refill    ondansetron (ZOFRAN) 4 MG tablet Take 1 tablet by mouth every 8 hours as needed for Nausea 20 tablet 0    simvastatin (ZOCOR) 20 MG tablet TAKE ONE TABLET BY MOUTH ONCE NIGHTLY 90 tablet 0    omeprazole (PRILOSEC) 40 MG delayed release capsule TAKE ONE CAPSULE BY MOUTH EVERY MORNING BEFORE BREAKFAST 60 capsule 1    butalbital-APAP-caffeine (FIORICET) -40 MG CAPS per capsule Take 1 capsule by mouth every 6 hours as needed for Headaches 30 capsule 1    ketorolac (TORADOL) 10 MG tablet Take 1 tablet by mouth every 6 hours as needed for Pain 30 tablet 0    amLODIPine (NORVASC) 10 MG tablet Take 1 tablet by mouth daily 90 tablet 3    labetalol (NORMODYNE) 200 MG tablet Take 1 tablet by mouth every morning 90 tablet 3    labetalol (NORMODYNE) 100 MG tablet Take 1 tablet by mouth every evening 90 tablet 3    gabapentin (NEURONTIN) 100 MG capsule Take 100 mg by mouth 3 times daily.       famotidine (PEPCID) 20 MG tablet Take 1 tablet by mouth 2 times daily as needed (heartburn) 60 tablet 3    RESTASIS 0.05 % ophthalmic emulsion Place 1 drop into both eyes every 12 hours       fexofenadine (ALLEGRA) 180 MG tablet Take 180 mg by mouth daily as needed       fluticasone (FLONASE) 50 MCG/ACT nasal spray 1 spray by Each Nostril route daily as needed       acetaminophen (APAP EXTRA STRENGTH) 500 MG tablet Take 1 tablet by mouth every 6 hours as needed for Pain 40 tablet 3     Allergies   Allergen Reactions    Lisinopril Swelling and Angioedema    Tetracyclines & Related Itching and Rash     Bumps in mouth and inside of hands         REVIEW OF SYSTEMS  (up to 7 for level 4, 8 or more for level 5) pertinent positives per HPI otherwise noted to be negative    PHYSICAL EXAM  BP (!) 144/88   Pulse 89   Temp (!) 100.8 °F (38.2 °C) (Oral)   Resp 20   Wt 195 lb 8.8 oz (88.7 kg)   SpO2 100%   BMI 32.54 kg/m²      CONSTITUTIONAL: AOx4, cooperative with exam, afebrile   HEAD: normocephalic, atraumatic   EYES: PERRL, EOMI, anicteric sclera   ENT: Moist mucous membranes, uvula midline, no swelling of the posterior pharynx   NECK: Supple, symmetric, trachea midline, no stridor, no meningismus   LUNGS: Bilateral breath sounds, CTAB, no rales/ronchi/wheezes, speaking in full sentences   CARDIOVASCULAR: RRR, normal S1/S2, no m/r/g, 2+ pulses throughout   ABDOMEN: Soft, non-tender, non-distended, +BS   NEUROLOGIC:  MAEx4, GCS 15   MUSCULOSKELETAL: No clubbing, cyanosis or edema   SKIN: No rash, pallor or wounds on exposed surfaces         RADIOLOGY  X-RAYS:  I have reviewed radiologic plain film image(s). ALL OTHER NON-PLAIN FILM IMAGES SUCH AS CT, ULTRASOUND AND MRI HAVE BEEN READ BY THE RADIOLOGIST. XR CHEST (2 VW)   Final Result   No radiographic evidence of acute pulmonary disease. EKG INTERPRETATION  None    PROCEDURES      ED COURSE/MDM  Viral syndrome, COVID-19, URI, pneumonia, influenza, UTI, other  Patient seen and evaluated. History and physical as above. Nontoxic, but does arrive febrile at 100.8 °F.  No tachycardia or tachypnea. O2 saturation 100% on room air. Will obtain COVID swab, flu swab, chest x-ray and urinalysis. Tylenol for fever. ED Course as of 11/30/22 1457   Wed Nov 30, 2022   1447 Patient's urinalysis negative for infection. COVID and flu negative. Patient updated on results. Patient likely has another viral syndrome causing her fever and chills. Discussed in well-hydrated as well as using Tylenol as needed for fevers. Patient agreeable. Will discharge with short course of Zofran. Return instruction provided. All questions answered prior to discharge. [DS]      ED Course User Index  [DS] Kylah Mosquera MD       Is this patient to be included in the SEP-1 Core Measure due to severe sepsis or septic shock?    No   Exclusion criteria - the patient is NOT to be included for SEP-1 Core Measure due to:  Viral etiology found or highly suspected (including COVID-19) without concomitant bacterial infection    The patient was counseled to closely monitor their symptoms, and to return immediately to the Emergency Department for any difficulty breathing, confusion, dizziness or passing out, vomiting, chest pain, high fevers, weakness, or any other new or concerning symptoms. There is no evidence of emergent medical condition at this time, and the patient will be discharged in good condition. Patient was given scripts for the following medications. I counseled patient how to take these medications. New Prescriptions    ONDANSETRON (ZOFRAN) 4 MG TABLET    Take 1 tablet by mouth every 8 hours as needed for Nausea           CLINICAL IMPRESSION  1. Viral syndrome    2. Fever in adult        Blood pressure (!) 144/88, pulse 89, temperature (!) 100.8 °F (38.2 °C), temperature source Oral, resp. rate 20, weight 195 lb 8.8 oz (88.7 kg), SpO2 100 %, not currently breastfeeding. DISPOSITION  Patient was discharged to home in good condition. [address] [facility] [place] [id number] [phone]    Call today  For a follow up appointment. Disclaimer: All medical record entries made by [employer] dictation.       (Please note that this note was completed with a voice recognition program. Every attempt was made to edit the dictations, but inevitably there remain words that are mis-transcribed.)            Luiza Ferreira MD  11/30/22 3051

## 2022-11-30 NOTE — DISCHARGE INSTRUCTIONS
Thank you for visiting [facility] Emergency Department. You need to call in morning to make appointment as directed with appropriate doctor. Should you have any questions regarding your care or further treatment, please call [facility] Emergency Department at [phone]. Take any medications as prescribed, if given any, otherwise for pain Use ibuprofen or Tylenol (unless prescribed medications that have Tylenol in it). You can take over the counter Ibuprofen (advil) tablets (4 tablets every 8 hours or 3 tablets every 6 hours or 2 tablets every 4 hours)    Return to ED if symptoms worsen, do not improve, fever > 101.5, excessive nausea or vomiting, and unable to follow up with your physician, or any other care or concern.

## 2022-12-12 RX ORDER — SIMVASTATIN 20 MG
TABLET ORAL
Qty: 90 TABLET | Refills: 0 | Status: SHIPPED | OUTPATIENT
Start: 2022-12-12

## 2022-12-12 NOTE — TELEPHONE ENCOUNTER
Medication:   Requested Prescriptions     Pending Prescriptions Disp Refills    simvastatin (ZOCOR) 20 MG tablet [Pharmacy Med Name: SIMVASTATIN 20 MG TABLET] 90 tablet 0     Sig: TAKE ONE TABLET BY MOUTH ONCE NIGHTLY        Last Filled:      Patient Phone Number: [phone] (home)     Last appt: 7/22/2022   Next appt: Visit date not found    Last OARRS:   RX Monitoring 4/16/2019   Attestation The Prescription Monitoring Report for this patient was reviewed today.    Periodic Controlled Substance Monitoring -

## 2023-03-10 ENCOUNTER — HOSPITAL ENCOUNTER (OUTPATIENT)
Dept: GENERAL RADIOLOGY | Age: 73
Discharge: HOME | End: 2023-03-10
Payer: MEDICARE

## 2023-03-10 ENCOUNTER — HOSPITAL ENCOUNTER (OUTPATIENT)
Age: 73
Discharge: HOME | End: 2023-03-10
Payer: MEDICARE

## 2023-03-10 DIAGNOSIS — M54.2 CERVICALGIA: ICD-10-CM

## 2023-03-10 DIAGNOSIS — G89.29 CHRONIC LEFT SHOULDER PAIN: ICD-10-CM

## 2023-03-10 DIAGNOSIS — M25.511 CHRONIC RIGHT SHOULDER PAIN: ICD-10-CM

## 2023-03-10 DIAGNOSIS — G89.29 CHRONIC RIGHT SHOULDER PAIN: ICD-10-CM

## 2023-03-10 DIAGNOSIS — M25.512 CHRONIC LEFT SHOULDER PAIN: ICD-10-CM

## 2023-03-10 PROCEDURE — 73030 X-RAY EXAM OF SHOULDER: CPT

## 2023-03-10 PROCEDURE — 72050 X-RAY EXAM NECK SPINE 4/5VWS: CPT

## 2023-03-16 NOTE — PLAN OF CARE
[facility]  [address] [place] [address]  Phone: [phone]   Fax:     [phone]      Physical Therapy Certification    Dear NERY Lauren *,    We had the pleasure of evaluating the following patient for physical therapy services at [facility]. A summary of our findings can be found in the initial assessment below. This includes our plan of care. If you have any questions or concerns regarding these findings, please do not hesitate to contact me at the office phone number checked above. Thank you for the referral.       Physician Signature:_______________________________Date:__________________  By signing above (or electronic signature), therapists plan is approved by physician      Patient: Guillermo Vallejo   : 1950   MRN: [id number]  Referring Physician: NERY Lauren *      Evaluation Date: 2023      Medical Diagnosis Information:  Medical Diagnosis: Cervicalgia [M54.2]  Pain in left shoulder [M25.512]  Pain in right shoulder [M25.511]  Dorsalgia, unspecified [M54.9]   Treatment Diagnosis: Decreased tolerance to prolonged functional positions, decreased tolerance to overhead ADLs                                    Insurance information: PT Insurance Information: [employer] Medicare    Precautions/ Contra-indications: Active cancer  Latex Allergy:  [x]NO      []YES  Preferred Language for Healthcare:   [x]English       []other:    C-SSRS Triggered by Intake questionnaire (Past 2 wk assessment ):   [] No, Questionnaire did not trigger screening. [x] Yes, Patient intake triggered C-SSRS Screening      [x] C-SSRS Screening completed; no further action required, see scanned form  [] PCP notified via Epic     SUBJECTIVE: Patient stated complaint: Patient reports that a year ago she had sciatica issues.  Was referred to a pain

## 2023-03-16 NOTE — FLOWSHEET NOTE
Reviewed/Progressed HEP activities related to improving balance, coordination, kinesthetic sense, posture, motor skill, proprioception of cervical, scapular, scapulothoracic and UE control with self care, reaching, carrying, lifting, house/yardwork, driving/computer work      Manual Treatments:  PROM / STM / Oscillations-Mobs:  G-I, II, III, IV (PA's, Inf., Post.)  [x] (83988) Provided manual therapy to mobilize soft tissue/joints of cervical/CT, scapular GHJ and UE for the purpose of decreasing headache, modulating pain, promoting relaxation,  increasing ROM, reducing/eliminating soft tissue swelling/inflammation/restriction, improving soft tissue extensibility and allowing for proper ROM for normal function with self care, reaching, carrying, lifting, house/yardwork, driving/computer work    Charges:  Timed Code Treatment Minutes: 20   Total Treatment Minutes: 45 (25 low, 10 TA, 10 TE)     [x] EVAL (LOW) 27559 (typically 20 minutes face-to-face)  [] EVAL (MOD) 59170 (typically 30 minutes face-to-face)  [] EVAL (HIGH) 71601 (typically 45 minutes face-to-face)  [] RE-EVAL     [x] VA(96644) x 1    [] Dry needle 1 or 2 Muscles (53281)  [] NMR (43847) x     [] Dry needle 3+ Muscles (14065)  [] Manual (53855) x     [] Ultrasound (49236) x  [x] TA (23719) x 1    [] Mech Traction (80048)  [] ES(attended) (97843)     [] ES (un) (45100):   [] Vasopump (59336) [] Ionto (59684)   [] Other:    GOALS:  Patient stated goal: cleaning bathroom  [] Progressing: [] Met: [] Not Met: [] Adjusted     Therapist goals for Patient:   Short Term Goals: To be achieved in: 2 weeks  1. Independent in HEP and progression per patient tolerance, in order to prevent re-injury. [] Progressing: [] Met: [] Not Met: [] Adjusted  2.  Patient will have a decrease in pain from 6/10 to maximum of 3/10 on VAS at rest and with activity to facilitate improved tolerance to movement, improved tolerance to functional mobility tasks such as ambulation at home

## 2023-03-20 RX ORDER — SIMVASTATIN 20 MG
TABLET ORAL
Qty: 90 TABLET | Refills: 0 | Status: SHIPPED | OUTPATIENT
Start: 2023-03-20

## 2023-03-20 NOTE — TELEPHONE ENCOUNTER
Medication:   Requested Prescriptions     Pending Prescriptions Disp Refills    simvastatin (ZOCOR) 20 MG tablet [Pharmacy Med Name: SIMVASTATIN 20 MG TABLET] 90 tablet 0     Sig: TAKE ONE TABLET BY MOUTH ONCE NIGHTLY       Last Filled:      Patient Phone Number: [phone] (home)     Last appt: 7/22/2022   Next appt: Visit date not found    Last Lipid:   Lab Results   Component Value Date/Time    CHOL 198 01/06/2022 12:00 PM    TRIG 107 01/06/2022 12:00 PM    HDL 62 01/06/2022 12:00 PM    HDL 51 09/23/2011 11:57 AM    LDLCALC 115 01/06/2022 12:00 PM

## 2023-03-23 ENCOUNTER — HOSPITAL ENCOUNTER (OUTPATIENT)
Dept: PHYSICAL THERAPY | Age: 73
Setting detail: THERAPIES SERIES
Discharge: HOME | End: 2023-03-23
Payer: MEDICARE

## 2023-03-23 PROCEDURE — 97161 PT EVAL LOW COMPLEX 20 MIN: CPT

## 2023-03-23 PROCEDURE — 97530 THERAPEUTIC ACTIVITIES: CPT

## 2023-03-23 PROCEDURE — 97110 THERAPEUTIC EXERCISES: CPT

## 2023-03-24 NOTE — TELEPHONE ENCOUNTER
Medication:   Requested Prescriptions     Pending Prescriptions Disp Refills    omeprazole (PRILOSEC) 40 MG delayed release capsule [Pharmacy Med Name: OMEPRAZOLE DR 40 MG CAPSULE] 90 capsule      Sig: TAKE ONE CAPSULE BY MOUTH EVERY MORNING BEFORE BREAKFAST        Last Filled:      Patient Phone Number: [phone] (home)     Last appt: 7/22/2022   Next appt: 3/24/2023    Last OARRS:   RX Monitoring 4/16/2019   Attestation The Prescription Monitoring Report for this patient was reviewed today.    Periodic Controlled Substance Monitoring -

## 2023-03-27 RX ORDER — OMEPRAZOLE 40 MG/1
CAPSULE, DELAYED RELEASE ORAL
Qty: 90 CAPSULE | Refills: 1 | Status: SHIPPED | OUTPATIENT
Start: 2023-03-27

## 2023-03-28 NOTE — TELEPHONE ENCOUNTER
Patient requesting a medication refill.     [facility]  Next office visit: 3/29/23    Last regular office visit: Visit date not found

## 2023-03-29 ENCOUNTER — HOSPITAL ENCOUNTER (OUTPATIENT)
Dept: PHYSICAL THERAPY | Age: 73
Setting detail: THERAPIES SERIES
Discharge: HOME | End: 2023-03-29
Payer: MEDICARE

## 2023-03-29 PROCEDURE — 97140 MANUAL THERAPY 1/> REGIONS: CPT

## 2023-03-29 PROCEDURE — 97110 THERAPEUTIC EXERCISES: CPT

## 2023-03-29 RX ORDER — OMEPRAZOLE 40 MG/1
40 CAPSULE, DELAYED RELEASE ORAL
Qty: 60 CAPSULE | Refills: 1 | Status: SHIPPED | OUTPATIENT
Start: 2023-03-29

## 2023-03-29 NOTE — FLOWSHEET NOTE
of compliance with HEP, compliance/attendance policy, answered all patient questions                       Modalities:  Min                    Other Therapeutic Activities:  Pt was educated on PT POC, Diagnosis, Prognosis, pathomechanics as well as frequency and duration of scheduling future physical therapy appointments. Time was also taken on this day to answer all patient questions and participation in PT. Reviewed appointment policy in detail with patient and patient verbalized understanding. Home Exercise Program:  Access Code: [email or web address]  URL: [employer] ([employer]  Date: 03/29/2023  Prepared by: Dot Breaker    Exercises  - Seated Upper Trapezius Stretch  - 2 x daily - 7 x weekly - 3 reps - 30 sec hold  - Doorway Pec Stretch at 60 Degrees Abduction with Arm Straight  - 2 x daily - 7 x weekly - 1 sets - 10 reps  - Shoulder Extension with Resistance  - 2 x daily - 7 x weekly - 1 sets - 10 reps    Therapeutic Exercise and NMR EXR  [x] (69682) Provided verbal/tactile cueing for activities related to strengthening, flexibility, endurance, ROM  for improvements in cervical, postural, scapular, scapulothoracic and UE control with self care, reaching, carrying, lifting, house/yardwork, driving/computer work. [x] (14029) Provided verbal/tactile cueing for activities related to improving balance, coordination, kinesthetic sense, posture, motor skill, proprioception  to assist with cervical, scapular, scapulothoracic and UE control with self care, reaching, carrying, lifting, house/yardwork, driving/computer work. Therapeutic Activities:    [] (46066 or 69845) Provided verbal/tactile cueing for activities related to improving balance, coordination, kinesthetic sense, posture, motor skill, proprioception and motor activation to allow for proper function of cervical, scapular, scapulothoracic and UE control with self care, carrying, lifting, driving/computer work.      Home Exercise Program:    [x] (64441)

## 2023-03-31 ENCOUNTER — HOSPITAL ENCOUNTER (OUTPATIENT)
Dept: PHYSICAL THERAPY | Age: 73
Setting detail: THERAPIES SERIES
Discharge: HOME | End: 2023-03-31
Payer: MEDICARE

## 2023-03-31 PROCEDURE — 97110 THERAPEUTIC EXERCISES: CPT

## 2023-03-31 PROCEDURE — 97140 MANUAL THERAPY 1/> REGIONS: CPT

## 2023-03-31 NOTE — FLOWSHEET NOTE
[address] [facility] [place]. [place] [address]  Phone: [phone]   Fax:     [phone]    Physical Therapy Treatment Note/ Progress Report:     Date:  2023    Patient Name:  Kerry Alba    :  1950  MRN: [id number]    Pertinent Medical History: Additional Pertinent Hx: Hyperlipidemia, hypertension, osteoarthritis    Medical/Treatment Diagnosis Information:  Medical Diagnosis: Cervicalgia [M54.2]  Pain in left shoulder [M25.512]  Pain in right shoulder [M25.511]  Dorsalgia, unspecified [M54.9]  Treatment Diagnosis: Decreased tolerance to prolonged functional positions, decreased tolerance to overhead ADLs    Insurance/Certification information:  PT Insurance Information: A[employer]na Medicare  Physician Information:  NERY Soares  Plan of care signed (Y/N): sent 2023    Date of Patient follow up with Physician:      Progress Report: []  Yes  [x]  No     Date Range for reporting period:  Beginnin2023  Ending:     Progress report due (10 Rx/or 30 days whichever is less):      Recertification due (POC duration/ or 90 days whichever is less): 6/15/23     Visit # Insurance/POC Allowable Auth Needed   3 Trevin Sudarshanalexa Aet[employer] Medicare  40 copay []Yes    [x]No     Outcome measure: Date assessed:  Outcome measure: Date assessed:  Test: UEFI  3/23/23    Test: NDI  3/23/23  Score: 60/80      Score: 9/50    Pain level: Sore prior to session, 0/10 after manual    HISTORY OF INJURY:  Patient stated complaint: Patient reports that a year ago she had sciatica issues. Was referred to a pain specialist, had 6 epidurals in her back. Now, patient reports that her pain is starting to come back. Patient reports that her arm was one of the places where she was having her pain. Patient reports pain is worse in R than L, but she does report pain in both.  Patient reports she has not had any

## 2023-04-04 ENCOUNTER — HOSPITAL ENCOUNTER (OUTPATIENT)
Dept: PHYSICAL THERAPY | Age: 73
Setting detail: THERAPIES SERIES
Discharge: HOME | End: 2023-04-04
Payer: MEDICARE

## 2023-04-04 PROCEDURE — 97140 MANUAL THERAPY 1/> REGIONS: CPT

## 2023-04-04 PROCEDURE — 97110 THERAPEUTIC EXERCISES: CPT

## 2023-04-04 NOTE — FLOWSHEET NOTE
mod)     [] EVAL (LOW) 33314 (typically 20 minutes face-to-face)  [] EVAL (MOD) 90254 (typically 30 minutes face-to-face)  [] EVAL (HIGH) 01495 (typically 45 minutes face-to-face)  [] RE-EVAL     [x] OE(47619) x 2    [] Dry needle 1 or 2 Muscles (65030)  [] NMR (32949) x     [] Dry needle 3+ Muscles (84731)  [x] Manual (18751) x 1    [] Ultrasound (70472) x  [] TA (49569) x     [] Mech Traction (63102)  [] ES(attended) (41821)     [] ES (un) (54159):   [] Vasopump (46442) [] Ionto (36004)   [] Other:    GOALS:  Patient stated goal: cleaning bathroom  [] Progressing: [] Met: [] Not Met: [] Adjusted     Therapist goals for Patient:   Short Term Goals: To be achieved in: 2 weeks  1. Independent in HEP and progression per patient tolerance, in order to prevent re-injury. [] Progressing: [] Met: [] Not Met: [] Adjusted  2. Patient will have a decrease in pain from 6/10 to maximum of 3/10 on VAS at rest and with activity to facilitate improved tolerance to movement, improved tolerance to functional mobility tasks such as ambulation at home and within the community, and improved tolerance to ADLs such as dressing and self-care activities. [] Progressing: [] Met: [] Not Met: [] Adjusted     Long Term Goals: To be achieved in: 12 weeks  1. Decrease UEFI functional outcome score from 60 to 75/80 to assist with reaching prior level of function. [] Progressing: [] Met: [] Not Met: [] Adjusted  2. Patient will demonstrate improved shoulder flexion AROM 160 degrees in sitting in order to facilitate improved ability to perform overhead activities including putting items on shelves. [] Progressing: [] Met: [] Not Met: [] Adjusted  3. Patient will demonstrate an increase in strength to 4/5 global shoulder and parascapular musculature to allow for proper functional mobility and improved tolerance to ADLs as indicated by patients Functional Deficits. [] Progressing: [] Met: [] Not Met: [] Adjusted  4.  Patient will report the

## 2023-04-07 ENCOUNTER — HOSPITAL ENCOUNTER (OUTPATIENT)
Dept: PHYSICAL THERAPY | Age: 73
Setting detail: THERAPIES SERIES
Discharge: HOME | End: 2023-04-07
Payer: MEDICARE

## 2023-04-07 PROCEDURE — 97112 NEUROMUSCULAR REEDUCATION: CPT

## 2023-04-07 PROCEDURE — 97140 MANUAL THERAPY 1/> REGIONS: CPT

## 2023-04-07 PROCEDURE — 97110 THERAPEUTIC EXERCISES: CPT

## 2023-04-07 NOTE — FLOWSHEET NOTE
reps  - Shoulder Extension with Resistance  - 2 x daily - 7 x weekly - 1 sets - 10 reps    Therapeutic Exercise and NMR EXR  [x] (60410) Provided verbal/tactile cueing for activities related to strengthening, flexibility, endurance, ROM  for improvements in cervical, postural, scapular, scapulothoracic and UE control with self care, reaching, carrying, lifting, house/yardwork, driving/computer work. [x] (41436) Provided verbal/tactile cueing for activities related to improving balance, coordination, kinesthetic sense, posture, motor skill, proprioception  to assist with cervical, scapular, scapulothoracic and UE control with self care, reaching, carrying, lifting, house/yardwork, driving/computer work. Therapeutic Activities:    [] (99196 or 19846) Provided verbal/tactile cueing for activities related to improving balance, coordination, kinesthetic sense, posture, motor skill, proprioception and motor activation to allow for proper function of cervical, scapular, scapulothoracic and UE control with self care, carrying, lifting, driving/computer work.      Home Exercise Program:    [x] (27657) Reviewed/Progressed HEP activities related to strengthening, flexibility, endurance, ROM of cervical, scapular, scapulothoracic and UE control with self care, reaching, carrying, lifting, house/yardwork, driving/computer work  [x] (78442) Reviewed/Progressed HEP activities related to improving balance, coordination, kinesthetic sense, posture, motor skill, proprioception of cervical, scapular, scapulothoracic and UE control with self care, reaching, carrying, lifting, house/yardwork, driving/computer work      Manual Treatments:  PROM / STM / Oscillations-Mobs:  G-I, II, III, IV (PA's, Inf., Post.)  [x] (20523) Provided manual therapy to mobilize soft tissue/joints of cervical/CT, scapular GHJ and UE for the purpose of decreasing headache, modulating pain, promoting relaxation,  increasing ROM, reducing/eliminating soft

## 2023-04-21 ENCOUNTER — HOSPITAL ENCOUNTER (OUTPATIENT)
Dept: PHYSICAL THERAPY | Age: 73
Setting detail: THERAPIES SERIES
Discharge: HOME | End: 2023-04-21
Payer: MEDICARE

## 2023-04-21 PROCEDURE — 97110 THERAPEUTIC EXERCISES: CPT

## 2023-04-21 PROCEDURE — 97140 MANUAL THERAPY 1/> REGIONS: CPT

## 2023-04-21 PROCEDURE — 97112 NEUROMUSCULAR REEDUCATION: CPT

## 2023-04-21 NOTE — PLAN OF CARE
has a numbing pain that comes from shoulders and goes all the way down her arm. Patient reports she is having difficulty doing cleaning, laundry, mopping. Patient reports she was not even able to make her bed. Patient has tried ice on shoulder, which helped a little. No bowel/bladder incontinence, no  strength deficits, no gait unsteadiness. SUBJECTIVE:    3/29/23: Patient reports that she is feeling a little bit better. The exercises at home seem to be helping.   3/31/23: Patient reports that she was a little sore after last session, but the soreness seems to be going down now. Patient reports she used a heat pack on her neck which helped with the soreness. 4/4/23: Patient reports that in general her neck/shoulders are feeling better. Does still have some pain anterior R shoulder when doing HEP at home, thinks it may be due to the fact that her band at home is a little too short. Provided new band that is longer. 4/7/23: Patient reports that she had an epidural yesterday in her low back. Her shoulders are feeling a little better, still has some pain anterior right shoulder though decreasing. 4/11/23: Patient reports that her neck continues to feel better. Still has some pain anterior R shoulder, but the pain is much improved. 4/14/23: Patient reports that she had a really bad cramp in her foot the other day while she was sleeping, woke her up and she could barely even get her foot flat. She has rolled out her foot and started drinking more water and it is feeling better. Reports she does not have any pain in the shoulder today at all. Reports that she is able to wash the back of her neck now without issue. 4/21/23: Patient reports she has not had any pain in the past week. She feels she is able and ready to do her exercises on her own at home.       OBJECTIVE:   Observation:   Test measurements:      ROM:  Date Shoulder Flexion Shldr abd Shdlr IR Shdlr ER    L R L R L R L R   3/29/23 WNL WNL

## 2023-04-26 ENCOUNTER — HOSPITAL ENCOUNTER (EMERGENCY)
Age: 73
Discharge: HOME | End: 2023-04-26
Payer: MEDICARE

## 2023-04-26 VITALS
WEIGHT: 192.68 LBS | HEART RATE: 66 BPM | BODY MASS INDEX: 32.1 KG/M2 | RESPIRATION RATE: 18 BRPM | SYSTOLIC BLOOD PRESSURE: 153 MMHG | TEMPERATURE: 97.4 F | OXYGEN SATURATION: 96 % | HEIGHT: 65 IN | DIASTOLIC BLOOD PRESSURE: 84 MMHG

## 2023-04-26 DIAGNOSIS — R03.0 ELEVATED BLOOD PRESSURE READING: ICD-10-CM

## 2023-04-26 DIAGNOSIS — R25.2 BILATERAL LEG CRAMPS: Primary | ICD-10-CM

## 2023-04-26 LAB
ALBUMIN SERPL-MCNC: 4.2 G/DL (ref 3.4–5)
ALBUMIN/GLOB SERPL: 1.4 {RATIO} (ref 1.1–2.2)
ALP SERPL-CCNC: 91 U/L (ref 40–129)
ALT SERPL-CCNC: 17 U/L (ref 10–40)
ANION GAP SERPL CALCULATED.3IONS-SCNC: 10 MMOL/L (ref 3–16)
AST SERPL-CCNC: 14 U/L (ref 15–37)
BASOPHILS # BLD: 0 K/UL (ref 0–0.2)
BASOPHILS NFR BLD: 0.3 %
BILIRUB SERPL-MCNC: 0.4 MG/DL (ref 0–1)
BUN SERPL-MCNC: 11 MG/DL (ref 7–20)
CALCIUM SERPL-MCNC: 9.4 MG/DL (ref 8.3–10.6)
CHLORIDE SERPL-SCNC: 107 MMOL/L (ref 99–110)
CO2 SERPL-SCNC: 25 MMOL/L (ref 21–32)
CREAT SERPL-MCNC: 0.8 MG/DL (ref 0.6–1.2)
DEPRECATED RDW RBC AUTO: 14.2 % (ref 12.4–15.4)
EOSINOPHIL # BLD: 0.2 K/UL (ref 0–0.6)
EOSINOPHIL NFR BLD: 2.8 %
GFR SERPLBLD CREATININE-BSD FMLA CKD-EPI: >60 ML/MIN/{1.73_M2}
GLUCOSE SERPL-MCNC: 93 MG/DL (ref 70–99)
HCT VFR BLD AUTO: 38 % (ref 36–48)
HGB BLD-MCNC: 12.5 G/DL (ref 12–16)
LYMPHOCYTES # BLD: 2.5 K/UL (ref 1–5.1)
LYMPHOCYTES NFR BLD: 43.1 %
MCH RBC QN AUTO: 30.5 PG (ref 26–34)
MCHC RBC AUTO-ENTMCNC: 32.8 G/DL (ref 31–36)
MCV RBC AUTO: 92.9 FL (ref 80–100)
MONOCYTES # BLD: 0.5 K/UL (ref 0–1.3)
MONOCYTES NFR BLD: 8.1 %
NEUTROPHILS # BLD: 2.7 K/UL (ref 1.7–7.7)
NEUTROPHILS NFR BLD: 45.7 %
NT-PROBNP SERPL-MCNC: 72 PG/ML (ref 0–124)
PLATELET # BLD AUTO: 289 K/UL (ref 135–450)
PMV BLD AUTO: 6.6 FL (ref 5–10.5)
POTASSIUM SERPL-SCNC: 4.1 MMOL/L (ref 3.5–5.1)
PROT SERPL-MCNC: 7.2 G/DL (ref 6.4–8.2)
RBC # BLD AUTO: 4.09 M/UL (ref 4–5.2)
SODIUM SERPL-SCNC: 142 MMOL/L (ref 136–145)
WBC # BLD AUTO: 5.8 K/UL (ref 4–11)

## 2023-04-26 PROCEDURE — 80053 COMPREHEN METABOLIC PANEL: CPT

## 2023-04-26 PROCEDURE — 99284 EMERGENCY DEPT VISIT MOD MDM: CPT

## 2023-04-26 PROCEDURE — 93970 EXTREMITY STUDY: CPT

## 2023-04-26 PROCEDURE — 36415 COLL VENOUS BLD VENIPUNCTURE: CPT

## 2023-04-26 PROCEDURE — 83880 ASSAY OF NATRIURETIC PEPTIDE: CPT

## 2023-04-26 PROCEDURE — 85025 COMPLETE CBC W/AUTO DIFF WBC: CPT

## 2023-04-26 RX ORDER — NAPROXEN 500 MG/1
500 TABLET ORAL 2 TIMES DAILY PRN
Qty: 20 TABLET | Refills: 0 | Status: SHIPPED | OUTPATIENT
Start: 2023-04-26

## 2023-04-26 ASSESSMENT — PAIN - FUNCTIONAL ASSESSMENT: PAIN_FUNCTIONAL_ASSESSMENT: 0-10

## 2023-04-26 ASSESSMENT — PAIN SCALES - GENERAL: PAINLEVEL_OUTOF10: 3

## 2023-04-26 NOTE — ED TRIAGE NOTES
Pt to ED with complaint of bilateral leg pain and feet pain that started 2 weeks ago. Pt states it feels like leg cramps and only comes on at night. No injury to legs.

## 2023-04-26 NOTE — ED NOTES
Discharge and education instructions reviewed. Patient verbalized understanding, teach-back successful. Patient denied questions at this time. No acute distress noted. Patient instructed to follow-up as noted - return to emergency department if symptoms worsen. Patient verbalized understanding. Discharged per EDMD with discharge instructions.         Disha Reyes RN  04/26/23 0799

## 2023-04-26 NOTE — ED PROVIDER NOTES
[address] [facility]        Pt Name: Crescencio Cheng  MRN: [id number]  [place] 1950  Date of evaluation: 4/26/2023  Provider: CHARLY Feldman  PCP: Nikhil Hernandez MD  Note Started: 10:53 AM EDT 4/26/23      MARGARITO. I have evaluated this patient. My supervising physician was available for consultation. CHIEF COMPLAINT       Chief Complaint   Patient presents with    Leg Pain    Foot Pain     Pt to ED with complaint of bilateral leg and feet cramps that started 2 weeks ago. Pt denies any abnormal swelling. Pt states the left leg is worse. Pt states it starts at night. HISTORY OF PRESENT ILLNESS: 1 or more Elements     History From: Patient    Crescencio Cheng is a 67 y.o. female who presents for bilateral lower leg and foot cramping for the past 2 weeks. Denies prior episodes of this. Pain was worse overnight so took Tylenol which helped. Wears compression stockings chronically. Wear supportive shoes with inserts. Sees podiatrist.  No lower extremity edema. No chest pain shortness of breath. No history CHF. Denies diuretic use    Nursing Notes were reviewed and agreed with or any disagreements were addressed in the HPI. REVIEW OF SYSTEMS :      Review of Systems    Positives and Pertinent negatives as per HPI.      SURGICAL HISTORY     Past Surgical History:   Procedure Laterality Date    ANKLE SURGERY      bone spur removed from achilles tendon  in  [address]      X 2    COLONOSCOPY      HYSTERECTOMY (CERVIX STATUS UNKNOWN)      KNEE ARTHROPLASTY Left 2017    KNEE ARTHROPLASTY Right 2008    KNEE ARTHROSCOPY Bilateral     NERVE BLOCK Right 11/5/2021    RIGHT SUPRASCAPULAR NERVE BLOCK performed by Mart Almonte MD at [facility]  01/23/2017    excision of back mass    PAIN MANAGEMENT PROCEDURE Right 10/29/2021    CERVICAL EPIDURAL STEROID

## 2023-04-27 ENCOUNTER — APPOINTMENT (OUTPATIENT)
Dept: PHYSICAL THERAPY | Age: 73
End: 2023-04-27
Payer: MEDICARE

## 2023-05-10 ENCOUNTER — OFFICE VISIT (OUTPATIENT)
Dept: PRIMARY CARE CLINIC | Age: 73
End: 2023-05-10

## 2023-05-10 VITALS
DIASTOLIC BLOOD PRESSURE: 72 MMHG | HEART RATE: 73 BPM | OXYGEN SATURATION: 98 % | BODY MASS INDEX: 31.95 KG/M2 | TEMPERATURE: 97.5 F | WEIGHT: 192 LBS | SYSTOLIC BLOOD PRESSURE: 130 MMHG

## 2023-05-10 DIAGNOSIS — E55.9 VITAMIN D DEFICIENCY: ICD-10-CM

## 2023-05-10 DIAGNOSIS — E78.00 PURE HYPERCHOLESTEROLEMIA: ICD-10-CM

## 2023-05-10 DIAGNOSIS — R25.2 MUSCLE CRAMPS: Primary | ICD-10-CM

## 2023-05-10 DIAGNOSIS — I10 PRIMARY HYPERTENSION: ICD-10-CM

## 2023-05-10 DIAGNOSIS — C7A.8 NEURO-ENDOCRINE CANCER (HCC): ICD-10-CM

## 2023-05-10 RX ORDER — TIZANIDINE 2 MG/1
2 TABLET ORAL NIGHTLY PRN
Qty: 40 TABLET | Refills: 3 | Status: SHIPPED | OUTPATIENT
Start: 2023-05-10

## 2023-05-10 SDOH — ECONOMIC STABILITY: FOOD INSECURITY: WITHIN THE PAST 12 MONTHS, YOU WORRIED THAT YOUR FOOD WOULD RUN OUT BEFORE YOU GOT MONEY TO BUY MORE.: NEVER TRUE

## 2023-05-10 SDOH — ECONOMIC STABILITY: HOUSING INSECURITY
IN THE LAST 12 MONTHS, WAS THERE A TIME WHEN YOU DID NOT HAVE A STEADY PLACE TO SLEEP OR SLEPT IN A SHELTER (INCLUDING NOW)?: NO

## 2023-05-10 SDOH — ECONOMIC STABILITY: FOOD INSECURITY: WITHIN THE PAST 12 MONTHS, THE FOOD YOU BOUGHT JUST DIDN'T LAST AND YOU DIDN'T HAVE MONEY TO GET MORE.: NEVER TRUE

## 2023-05-10 SDOH — ECONOMIC STABILITY: INCOME INSECURITY: HOW HARD IS IT FOR YOU TO PAY FOR THE VERY BASICS LIKE FOOD, HOUSING, MEDICAL CARE, AND HEATING?: NOT HARD AT ALL

## 2023-05-10 ASSESSMENT — PATIENT HEALTH QUESTIONNAIRE - PHQ9
SUM OF ALL RESPONSES TO PHQ QUESTIONS 1-9: 0
SUM OF ALL RESPONSES TO PHQ QUESTIONS 1-9: 0
2. FEELING DOWN, DEPRESSED OR HOPELESS: 0
SUM OF ALL RESPONSES TO PHQ QUESTIONS 1-9: 0
1. LITTLE INTEREST OR PLEASURE IN DOING THINGS: 0
SUM OF ALL RESPONSES TO PHQ QUESTIONS 1-9: 0
SUM OF ALL RESPONSES TO PHQ9 QUESTIONS 1 & 2: 0

## 2023-05-10 ASSESSMENT — ENCOUNTER SYMPTOMS
SHORTNESS OF BREATH: 0
GASTROINTESTINAL NEGATIVE: 1
ABDOMINAL DISTENTION: 0
CHEST TIGHTNESS: 0
COUGH: 0
ALLERGIC/IMMUNOLOGIC NEGATIVE: 1
EYES NEGATIVE: 1
ABDOMINAL PAIN: 0
WHEEZING: 0

## 2023-05-10 NOTE — PROGRESS NOTES
Elizabeth Lake (:  1950) is a 67 y.o. female,Established patient, here for evaluation of the following chief complaint(s):  Follow-Up from Hospital         ASSESSMENT/PLAN:  1. Muscle cramps: Usually in the legs nightly. No history of kidney stones. When patient went to the emergency room normal potassium and renal function. She does take a statin. We will start Caltrate D over-the-counter with 600 mg of calcium and 800 units of vitamin D3 twice a day and magnesium 200 mg over-the-counter daily. Check magnesium level and Vitamin D.  -     Magnesium; Future  -     Vitamin D 25 Hydroxy; Future  -     tiZANidine (ZANAFLEX) 2 MG tablet; Take 1 tablet by mouth nightly as needed (muscle cramps.), Disp-40 tablet, R-3Normal  -     CK; Future  2. Vitamin D deficiency  High risk for deficiency living in the [place] so we will check level with leg cramps. -     Vitamin D 25 Hydroxy; Future  3. [facility] check annually with Dr. Max Cardoso and Dr. Connor Bolanos and was stable at recent check. 4. Primary hypertension controlled on current regimen continue. BP Readings from Last 3 Encounters:   05/10/23 130/72   23 (!) 153/84   22 (!) 144/88       5. Pure hypercholesterolemia tolerating simvastatin well with muscle cramps we will check CK level and patient will come back to get a fasting lipid. -     Lipid Panel; Future      Return in about 4 weeks (around 2023). Subjective   SUBJECTIVE/OBJECTIVE:  Leg Pain   Incident onset: Complaint of severe muscle cramps every night. Evening went to emergency room and negative venous Dopplers and negative CMP. There was no injury mechanism. The pain is present in the left leg and right leg. The quality of the pain is described as cramping, shooting and stabbing (Pain is only associated with a muscle cramp and resolves as soon as the cramps stops). The pain is at a severity of 9/10. The pain is severe.  The pain has been Intermittent

## 2023-05-10 NOTE — PATIENT INSTRUCTIONS
Take over the counter Caltrate D with 600 mg of calcium and 800 units twice a day and magnesium 200 mg daily.

## 2023-05-12 ENCOUNTER — HOSPITAL ENCOUNTER (OUTPATIENT)
Dept: CT IMAGING | Age: 73
Discharge: HOME | End: 2023-05-12
Payer: MEDICARE

## 2023-05-12 DIAGNOSIS — C7A.010 MALIGNANT CARCINOID TUMOR OF DUODENUM (HCC): ICD-10-CM

## 2023-05-12 PROCEDURE — 6360000004 HC RX CONTRAST MEDICATION: Performed by: INTERNAL MEDICINE

## 2023-05-12 PROCEDURE — 71260 CT THORAX DX C+: CPT

## 2023-05-12 RX ADMIN — IOPAMIDOL 75 ML: 755 INJECTION, SOLUTION INTRAVENOUS at 13:55

## 2023-05-15 DIAGNOSIS — E78.00 PURE HYPERCHOLESTEROLEMIA: ICD-10-CM

## 2023-05-15 DIAGNOSIS — E55.9 VITAMIN D DEFICIENCY: ICD-10-CM

## 2023-05-15 DIAGNOSIS — R25.2 MUSCLE CRAMPS: ICD-10-CM

## 2023-05-15 LAB
25(OH)D3 SERPL-MCNC: 43 NG/ML
CHOLEST SERPL-MCNC: 167 MG/DL (ref 0–199)
CK SERPL-CCNC: 170 U/L (ref 26–192)
HDLC SERPL-MCNC: 68 MG/DL (ref 40–60)
LDLC SERPL CALC-MCNC: 84 MG/DL
MAGNESIUM SERPL-MCNC: 2.1 MG/DL (ref 1.8–2.4)
TRIGL SERPL-MCNC: 76 MG/DL (ref 0–150)
VLDLC SERPL CALC-MCNC: 15 MG/DL

## 2023-05-18 DIAGNOSIS — E78.00 PURE HYPERCHOLESTEROLEMIA: Primary | ICD-10-CM

## 2023-05-18 RX ORDER — SIMVASTATIN 40 MG
40 TABLET ORAL NIGHTLY
Qty: 90 TABLET | Refills: 3 | Status: SHIPPED | OUTPATIENT
Start: 2023-05-18

## 2023-05-29 SDOH — ECONOMIC STABILITY: FOOD INSECURITY: WITHIN THE PAST 12 MONTHS, THE FOOD YOU BOUGHT JUST DIDN'T LAST AND YOU DIDN'T HAVE MONEY TO GET MORE.: NEVER TRUE

## 2023-05-29 SDOH — ECONOMIC STABILITY: TRANSPORTATION INSECURITY
IN THE PAST 12 MONTHS, HAS LACK OF TRANSPORTATION KEPT YOU FROM MEETINGS, WORK, OR FROM GETTING THINGS NEEDED FOR DAILY LIVING?: NO

## 2023-05-29 SDOH — ECONOMIC STABILITY: INCOME INSECURITY: HOW HARD IS IT FOR YOU TO PAY FOR THE VERY BASICS LIKE FOOD, HOUSING, MEDICAL CARE, AND HEATING?: NOT HARD AT ALL

## 2023-05-29 SDOH — ECONOMIC STABILITY: FOOD INSECURITY: WITHIN THE PAST 12 MONTHS, YOU WORRIED THAT YOUR FOOD WOULD RUN OUT BEFORE YOU GOT MONEY TO BUY MORE.: NEVER TRUE

## 2023-06-01 ENCOUNTER — OFFICE VISIT (OUTPATIENT)
Dept: PRIMARY CARE CLINIC | Age: 73
End: 2023-06-01

## 2023-06-01 VITALS
TEMPERATURE: 97.2 F | DIASTOLIC BLOOD PRESSURE: 65 MMHG | SYSTOLIC BLOOD PRESSURE: 135 MMHG | HEIGHT: 65 IN | OXYGEN SATURATION: 100 % | WEIGHT: 191.8 LBS | BODY MASS INDEX: 31.96 KG/M2 | HEART RATE: 67 BPM

## 2023-06-01 DIAGNOSIS — I10 ESSENTIAL HYPERTENSION: ICD-10-CM

## 2023-06-01 DIAGNOSIS — M79.10 MYALGIA: ICD-10-CM

## 2023-06-01 RX ORDER — LABETALOL 100 MG/1
100 TABLET, FILM COATED ORAL EVERY EVENING
Qty: 90 TABLET | Refills: 3 | Status: SHIPPED | OUTPATIENT
Start: 2023-06-01

## 2023-06-01 RX ORDER — GLUCOSAMINE/D3/BOSWELLIA SERRA 1500MG-400
TABLET ORAL
COMMUNITY

## 2023-06-01 RX ORDER — LABETALOL 200 MG/1
200 TABLET, FILM COATED ORAL EVERY MORNING
Qty: 90 TABLET | Refills: 3 | Status: SHIPPED | OUTPATIENT
Start: 2023-06-01

## 2023-06-01 RX ORDER — AMLODIPINE BESYLATE 10 MG/1
10 TABLET ORAL DAILY
Qty: 90 TABLET | Refills: 3 | Status: SHIPPED | OUTPATIENT
Start: 2023-06-01

## 2023-06-01 SDOH — ECONOMIC STABILITY: FOOD INSECURITY: WITHIN THE PAST 12 MONTHS, THE FOOD YOU BOUGHT JUST DIDN'T LAST AND YOU DIDN'T HAVE MONEY TO GET MORE.: NEVER TRUE

## 2023-06-01 SDOH — ECONOMIC STABILITY: INCOME INSECURITY: HOW HARD IS IT FOR YOU TO PAY FOR THE VERY BASICS LIKE FOOD, HOUSING, MEDICAL CARE, AND HEATING?: NOT HARD AT ALL

## 2023-06-01 SDOH — ECONOMIC STABILITY: FOOD INSECURITY: WITHIN THE PAST 12 MONTHS, YOU WORRIED THAT YOUR FOOD WOULD RUN OUT BEFORE YOU GOT MONEY TO BUY MORE.: NEVER TRUE

## 2023-06-01 ASSESSMENT — ENCOUNTER SYMPTOMS
WHEEZING: 0
ALLERGIC/IMMUNOLOGIC NEGATIVE: 1
GASTROINTESTINAL NEGATIVE: 1
EYES NEGATIVE: 1
ABDOMINAL PAIN: 0
COUGH: 0
ABDOMINAL DISTENTION: 0
SHORTNESS OF BREATH: 0
CHEST TIGHTNESS: 0

## 2023-06-01 NOTE — PROGRESS NOTES
Subjective:      Patient ID: Manuelito Qureshi is a 67 y.o. female. 6/1/2023  Patient presents with:  Hypertension    Saw Dr Yulissa Root 5/10/23 for muscle cramps! Last seen  7/22/2022 Patient presents with:  Medicare AWV  Hypertension            4/22/2022 Patient presents with: Other: follow up (BP)    Exercising reg . Not needing xanax             3/28/2022 Patient presents with:  Hypertension  Headache  Depression: with anxiety . Concerned about wt gain                 1/6/2022 Patient presents with:  Hypertension  Cannot take pm dose of labetalol 200 ! Taking it in am + Norvasc   Abdominal Pain: no pain as such ; more back pain   . 1 month f/u Had repeat biopsy by Dr Darnell Yanes 12/21 ; Nl pathology . CT abd / chest  8/21 done by Dr Sim Camacho ; advised f/u in a yr     Has questions anoout Omega 3 . Been off it for a while     Ch back pain ; C/O Ortho . Got steroid shots in back . Unable to exercise much b/e pain             Last seen  8/13/2021 Patient presents with:  Hypertension . NOT taking higher dose of Labetalol as prescribed at last visit . States readings are fine at home! 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 C/o  Dr Aniya Arias             7/7/2021 Patient presents with:  Medicare AWV  Hypertension  Stress: because of                 Last seen   1/7/2021 Patient presents with:  Hypertension  Generally well   No falls   Stressed with 's personality issues! Managing with reg daily exercise              6/30/2020   Medicare AWV          12/19/2019        10/15/19   Rupture injury / ankle while on Treadmill . C/O Podiatrist         3/26/19     1/29/19      9/18/18       8/15/18         7/26/18 Hypertension: stressed b/e of  'not giving me space '  Headache                      Review of Systems   Constitutional:  Negative for activity change, appetite change, fatigue, fever and unexpected weight change.

## 2023-07-06 DIAGNOSIS — F41.9 ANXIETY: ICD-10-CM

## 2023-07-06 RX ORDER — ALPRAZOLAM 0.25 MG/1
0.25 TABLET ORAL 3 TIMES DAILY PRN
Qty: 30 TABLET | Refills: 1 | Status: SHIPPED | OUTPATIENT
Start: 2023-07-06 | End: 2023-08-05

## 2023-07-06 NOTE — TELEPHONE ENCOUNTER
Pt need refill on Zanax (Alprazol). She needs something to calm her as she is traveling to [facility]. Please callback [phone] when script is sent. [facility] [address] Fax is [phone]. Note: pt leaves for trip on 07/12/23.

## 2023-09-01 DIAGNOSIS — E78.00 PURE HYPERCHOLESTEROLEMIA: ICD-10-CM

## 2023-09-01 RX ORDER — SIMVASTATIN 40 MG
40 TABLET ORAL NIGHTLY
Qty: 90 TABLET | Refills: 3 | Status: SHIPPED | OUTPATIENT
Start: 2023-09-01 | End: 2023-09-11

## 2023-09-05 ENCOUNTER — TELEPHONE (OUTPATIENT)
Dept: PRIMARY CARE CLINIC | Age: 73
End: 2023-09-05

## 2023-09-10 SDOH — HEALTH STABILITY: PHYSICAL HEALTH: ON AVERAGE, HOW MANY DAYS PER WEEK DO YOU ENGAGE IN MODERATE TO STRENUOUS EXERCISE (LIKE A BRISK WALK)?: 2 DAYS

## 2023-09-10 SDOH — ECONOMIC STABILITY: FOOD INSECURITY: WITHIN THE PAST 12 MONTHS, YOU WORRIED THAT YOUR FOOD WOULD RUN OUT BEFORE YOU GOT MONEY TO BUY MORE.: PATIENT DECLINED

## 2023-09-10 SDOH — ECONOMIC STABILITY: INCOME INSECURITY: HOW HARD IS IT FOR YOU TO PAY FOR THE VERY BASICS LIKE FOOD, HOUSING, MEDICAL CARE, AND HEATING?: PATIENT DECLINED

## 2023-09-10 SDOH — ECONOMIC STABILITY: FOOD INSECURITY: WITHIN THE PAST 12 MONTHS, THE FOOD YOU BOUGHT JUST DIDN'T LAST AND YOU DIDN'T HAVE MONEY TO GET MORE.: PATIENT DECLINED

## 2023-09-10 SDOH — HEALTH STABILITY: PHYSICAL HEALTH: ON AVERAGE, HOW MANY MINUTES DO YOU ENGAGE IN EXERCISE AT THIS LEVEL?: 30 MIN

## 2023-09-10 ASSESSMENT — PATIENT HEALTH QUESTIONNAIRE - PHQ9
SUM OF ALL RESPONSES TO PHQ QUESTIONS 1-9: 2
2. FEELING DOWN, DEPRESSED OR HOPELESS: 2
1. LITTLE INTEREST OR PLEASURE IN DOING THINGS: 0
SUM OF ALL RESPONSES TO PHQ9 QUESTIONS 1 & 2: 2

## 2023-09-10 ASSESSMENT — LIFESTYLE VARIABLES
HOW OFTEN DO YOU HAVE A DRINK CONTAINING ALCOHOL: 1
HOW OFTEN DO YOU HAVE SIX OR MORE DRINKS ON ONE OCCASION: 1
HOW OFTEN DO YOU HAVE A DRINK CONTAINING ALCOHOL: NEVER

## 2023-09-11 ENCOUNTER — TELEMEDICINE (OUTPATIENT)
Dept: PRIMARY CARE CLINIC | Age: 73
End: 2023-09-11

## 2023-09-11 DIAGNOSIS — E78.00 PURE HYPERCHOLESTEROLEMIA: ICD-10-CM

## 2023-09-11 DIAGNOSIS — Z00.00 MEDICARE ANNUAL WELLNESS VISIT, SUBSEQUENT: Primary | ICD-10-CM

## 2023-09-11 DIAGNOSIS — F41.9 ANXIETY AND DEPRESSION: ICD-10-CM

## 2023-09-11 DIAGNOSIS — R63.8 WEIGHT DISORDER: ICD-10-CM

## 2023-09-11 DIAGNOSIS — F32.A ANXIETY AND DEPRESSION: ICD-10-CM

## 2023-09-11 RX ORDER — SIMVASTATIN 20 MG
20 TABLET ORAL NIGHTLY
Qty: 90 TABLET | Refills: 3 | Status: SHIPPED | OUTPATIENT
Start: 2023-09-11

## 2023-09-11 ASSESSMENT — ENCOUNTER SYMPTOMS
EYES NEGATIVE: 1
ABDOMINAL PAIN: 0
CHEST TIGHTNESS: 0
ABDOMINAL DISTENTION: 0
SHORTNESS OF BREATH: 0
ALLERGIC/IMMUNOLOGIC NEGATIVE: 1
COUGH: 0
GASTROINTESTINAL NEGATIVE: 1
WHEEZING: 0

## 2023-09-11 NOTE — PROGRESS NOTES
Flu  Vac 10/22    tdap  10/16    Zostavac 2013 / Ghadagabriela Jaramillo . Shingrex 10/19    Pneumonia vac   5/19 ; 10/15    Covid Vac 5/22  #4 booster    HENT: Negative. Dental ex   Reg    Eyes: Negative. Negative for visual disturbance. Eye  Ex   9/22. Wears Glasses    Respiratory:  Negative for cough, chest tightness, shortness of breath and wheezing. Does not smoke . No Etoh . No asthma   Cardiovascular: Negative. HTN > 10 yrs  , No known CAD     Mom had MI in 76s   Gastrointestinal: Negative. Negative for abdominal distention and abdominal pain. CT  Chest 5/23 CHEST:     No evidence of acute normality or metastatic disease identified in the chest.     ABDOMEN AND PELVIS:     No evidence of acute process or metastatic disease identified in the abdomen or pelvis. 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 C/o  Dr Michelle Suarez           Repeat Upper Endoscopy 6/21  Schatzki ring   Repeat lower Colonoscopy NL  Dr Julian Corrales / [facility] 3/2014  . Clean . Good for 10 yrs . Dr Adore John         Endocrine: Negative. No FH of diabetes   Genitourinary:  Negative for dysuria, frequency, menstrual problem, urgency and vaginal discharge. S/P Hysterectomy . Mammogram 4/23    Daughter 39, with Breast Cancer   Musculoskeletal: Negative. DEXA 11/13 . No osteopenia    S/P Rt knee replac 4/10/17 . Left knee 2009    Allergic/Immunologic: Negative. Negative for food allergies. Neurological:  Negative for dizziness, weakness and headaches. Psychiatric/Behavioral:  Positive for dysphoric mood. Negative for behavioral problems and sleep disturbance. The patient is not nervous/anxious. Was seeing  Psychiatry / Ms Jimena Mckee .    No meds currently       Objective:   Physical Exam  Pulmonary:      Effort: Pulmonary effort is normal.   Neurological:      Mental Status:

## 2023-12-15 ENCOUNTER — OFFICE VISIT (OUTPATIENT)
Dept: PRIMARY CARE CLINIC | Age: 73
End: 2023-12-15

## 2023-12-15 VITALS
TEMPERATURE: 97.2 F | SYSTOLIC BLOOD PRESSURE: 138 MMHG | HEART RATE: 70 BPM | OXYGEN SATURATION: 100 % | DIASTOLIC BLOOD PRESSURE: 70 MMHG | BODY MASS INDEX: 32.19 KG/M2 | HEIGHT: 65 IN | WEIGHT: 193.2 LBS

## 2023-12-15 DIAGNOSIS — R51.9 ACUTE NONINTRACTABLE HEADACHE, UNSPECIFIED HEADACHE TYPE: ICD-10-CM

## 2023-12-15 DIAGNOSIS — Z29.11 NEED FOR RSV VACCINATION: Primary | ICD-10-CM

## 2023-12-15 DIAGNOSIS — I10 ESSENTIAL HYPERTENSION: ICD-10-CM

## 2023-12-15 RX ORDER — AMLODIPINE BESYLATE 10 MG/1
10 TABLET ORAL DAILY
Qty: 90 TABLET | Refills: 3 | Status: SHIPPED | OUTPATIENT
Start: 2023-12-15

## 2023-12-15 RX ORDER — LABETALOL 200 MG/1
200 TABLET, FILM COATED ORAL EVERY MORNING
Qty: 90 TABLET | Refills: 3 | Status: SHIPPED | OUTPATIENT
Start: 2023-12-15

## 2023-12-15 RX ORDER — GABAPENTIN 100 MG/1
100 CAPSULE ORAL 3 TIMES DAILY
Qty: 90 CAPSULE | Refills: 3 | Status: SHIPPED | OUTPATIENT
Start: 2023-12-15 | End: 2024-03-14

## 2023-12-15 RX ORDER — LABETALOL 100 MG/1
100 TABLET, FILM COATED ORAL EVERY EVENING
Qty: 90 TABLET | Refills: 3 | Status: SHIPPED | OUTPATIENT
Start: 2023-12-15

## 2023-12-15 NOTE — PROGRESS NOTES
Subjective:      Patient ID: Tiffany Uriarte is a 75 y.o. female. 12/15/2023 Patient presents with:  Hypertension                  9/11/23  VV           6/1/2023  Patient presents with:  Hypertension    Saw Dr Caro Red 5/10/23 for muscle cramps! Last seen  7/22/2022 Patient presents with:  Medicare AWV  Hypertension            4/22/2022 Patient presents with: Other: follow up (BP)    Exercising reg . Not needing xanax             3/28/2022 Patient presents with:  Hypertension  Headache  Depression: with anxiety . Concerned about wt gain                 1/6/2022 Patient presents with:  Hypertension  Cannot take pm dose of labetalol 200 ! Taking it in am + Norvasc   Abdominal Pain: no pain as such ; more back pain   . 1 month f/u Had repeat biopsy by Dr Jim Brennan 12/21 ; Nl pathology . CT abd / chest  8/21 done by Dr Breonna Fu ; advised f/u in a yr     Has questions anoout Omega 3 . Been off it for a while     Ch back pain ; C/O Ortho . Got steroid shots in back . Unable to exercise much b/e pain             Last seen  8/13/2021 Patient presents with:  Hypertension . NOT taking higher dose of Labetalol as prescribed at last visit . States readings are fine at home! 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 C/o  Dr Kasey Villarreal             7/7/2021 Patient presents with:  Medicare AWV  Hypertension  Stress: because of                 Last seen   1/7/2021 Patient presents with:  Hypertension  Generally well   No falls   Stressed with 's personality issues! Managing with reg daily exercise              6/30/2020   Medicare AWV          12/19/2019        10/15/19   Rupture injury / ankle while on Treadmill .  C/O Podiatrist         3/26/19     1/29/19      9/18/18       8/15/18         7/26/18 Hypertension: stressed b/e of  'not giving me space '  Headache                        Review of Systems   Constitutional:  Negative for

## 2024-03-07 RX ORDER — OMEPRAZOLE 40 MG/1
CAPSULE, DELAYED RELEASE ORAL
Qty: 90 CAPSULE | Refills: 1 | Status: SHIPPED | OUTPATIENT
Start: 2024-03-07

## 2024-03-07 NOTE — TELEPHONE ENCOUNTER
Medication:   Requested Prescriptions     Pending Prescriptions Disp Refills    omeprazole (PRILOSEC) 40 MG delayed release capsule [Pharmacy Med Name: OMEPRAZOLE DR 40 MG CAPSULE] 90 capsule 1     Sig: TAKE ONE CAPSULE BY MOUTH EVERY MORNING BEFORE BREAKFAST        Last Filled:      Patient Phone Number: [phone] (home)     Last appt: 12/15/2023   Next appt: 6/18/2024    Last OARRS:       4/16/2019    10:04 AM   RX Monitoring   Attestation The Prescription Monitoring Report for this patient was reviewed today.

## 2024-03-23 ASSESSMENT — PATIENT HEALTH QUESTIONNAIRE - PHQ9
6. FEELING BAD ABOUT YOURSELF - OR THAT YOU ARE A FAILURE OR HAVE LET YOURSELF OR YOUR FAMILY DOWN: NOT AT ALL
10. IF YOU CHECKED OFF ANY PROBLEMS, HOW DIFFICULT HAVE THESE PROBLEMS MADE IT FOR YOU TO DO YOUR WORK, TAKE CARE OF THINGS AT HOME, OR GET ALONG WITH OTHER PEOPLE: NOT DIFFICULT AT ALL
4. FEELING TIRED OR HAVING LITTLE ENERGY: NOT AT ALL
1. LITTLE INTEREST OR PLEASURE IN DOING THINGS: NOT AT ALL
1. LITTLE INTEREST OR PLEASURE IN DOING THINGS: NOT AT ALL
2. FEELING DOWN, DEPRESSED OR HOPELESS: NOT AT ALL
8. MOVING OR SPEAKING SO SLOWLY THAT OTHER PEOPLE COULD HAVE NOTICED. OR THE OPPOSITE, BEING SO FIGETY OR RESTLESS THAT YOU HAVE BEEN MOVING AROUND A LOT MORE THAN USUAL: NOT AT ALL
5. POOR APPETITE OR OVEREATING: NOT AT ALL
SUM OF ALL RESPONSES TO PHQ9 QUESTIONS 1 & 2: 0
SUM OF ALL RESPONSES TO PHQ QUESTIONS 1-9: 0
4. FEELING TIRED OR HAVING LITTLE ENERGY: NOT AT ALL
3. TROUBLE FALLING OR STAYING ASLEEP: NOT AT ALL
SUM OF ALL RESPONSES TO PHQ QUESTIONS 1-9: 0
3. TROUBLE FALLING OR STAYING ASLEEP: NOT AT ALL
6. FEELING BAD ABOUT YOURSELF - OR THAT YOU ARE A FAILURE OR HAVE LET YOURSELF OR YOUR FAMILY DOWN: NOT AT ALL
7. TROUBLE CONCENTRATING ON THINGS, SUCH AS READING THE NEWSPAPER OR WATCHING TELEVISION: NOT AT ALL
7. TROUBLE CONCENTRATING ON THINGS, SUCH AS READING THE NEWSPAPER OR WATCHING TELEVISION: NOT AT ALL
10. IF YOU CHECKED OFF ANY PROBLEMS, HOW DIFFICULT HAVE THESE PROBLEMS MADE IT FOR YOU TO DO YOUR WORK, TAKE CARE OF THINGS AT HOME, OR GET ALONG WITH OTHER PEOPLE: NOT DIFFICULT AT ALL
5. POOR APPETITE OR OVEREATING: NOT AT ALL
SUM OF ALL RESPONSES TO PHQ QUESTIONS 1-9: 0
SUM OF ALL RESPONSES TO PHQ QUESTIONS 1-9: 0
9. THOUGHTS THAT YOU WOULD BE BETTER OFF DEAD, OR OF HURTING YOURSELF: NOT AT ALL
SUM OF ALL RESPONSES TO PHQ QUESTIONS 1-9: 0
2. FEELING DOWN, DEPRESSED OR HOPELESS: NOT AT ALL
9. THOUGHTS THAT YOU WOULD BE BETTER OFF DEAD, OR OF HURTING YOURSELF: NOT AT ALL
8. MOVING OR SPEAKING SO SLOWLY THAT OTHER PEOPLE COULD HAVE NOTICED. OR THE OPPOSITE - BEING SO FIDGETY OR RESTLESS THAT YOU HAVE BEEN MOVING AROUND A LOT MORE THAN USUAL: NOT AT ALL

## 2024-03-26 ENCOUNTER — OFFICE VISIT (OUTPATIENT)
Dept: PRIMARY CARE CLINIC | Age: 74
End: 2024-03-26
Payer: MEDICARE

## 2024-03-26 VITALS
BODY MASS INDEX: 31.18 KG/M2 | WEIGHT: 187.4 LBS | RESPIRATION RATE: 18 BRPM | HEART RATE: 73 BPM | SYSTOLIC BLOOD PRESSURE: 148 MMHG | DIASTOLIC BLOOD PRESSURE: 80 MMHG

## 2024-03-26 DIAGNOSIS — R07.89 CHEST WALL PAIN: Primary | ICD-10-CM

## 2024-03-26 DIAGNOSIS — I10 PRIMARY HYPERTENSION: ICD-10-CM

## 2024-03-26 PROCEDURE — 99213 OFFICE O/P EST LOW 20 MIN: CPT | Performed by: FAMILY MEDICINE

## 2024-03-26 PROCEDURE — 3078F DIAST BP <80 MM HG: CPT | Performed by: FAMILY MEDICINE

## 2024-03-26 PROCEDURE — 3077F SYST BP >= 140 MM HG: CPT | Performed by: FAMILY MEDICINE

## 2024-03-26 PROCEDURE — 1123F ACP DISCUSS/DSCN MKR DOCD: CPT | Performed by: FAMILY MEDICINE

## 2024-03-26 RX ORDER — OMEPRAZOLE 40 MG/1
40 CAPSULE, DELAYED RELEASE ORAL
Qty: 90 CAPSULE | Refills: 1 | Status: SHIPPED | OUTPATIENT
Start: 2024-03-26

## 2024-03-26 ASSESSMENT — ENCOUNTER SYMPTOMS
DIARRHEA: 0
CONSTIPATION: 0
BREAST PAIN: 1
ABDOMINAL PAIN: 0
BLOOD IN STOOL: 0
SHORTNESS OF BREATH: 0

## 2024-03-26 NOTE — PROGRESS NOTES
MD   amLODIPine (NORVASC) 10 MG tablet Take 1 tablet by mouth daily  Azar Nelson MD   simvastatin (ZOCOR) 20 MG tablet Take 1 tablet by mouth nightly  Azar Nelson MD   Boswellia-Glucosamine-Vit D (OSTEO BI-FLEX ONE PER DAY) TABS Take by mouth  ProviderBreanne MD   RESTASIS 0.05 % ophthalmic emulsion Place 1 drop into both eyes in the morning and 1 drop in the evening.  Provider, MD Breanne        Social History     Tobacco Use    Smoking status: Never    Smokeless tobacco: Never    Tobacco comments:     quit when 29   Substance Use Topics    Alcohol use: No        Vitals:    03/26/24 1610 03/26/24 1616   BP: (!) 155/78 (!) 148/80   Pulse: 73    Resp: 18    Weight: 85 kg (187 lb 6.4 oz)      Estimated body mass index is 31.18 kg/m² as calculated from the following:    Height as of 12/15/23: 1.651 m (5' 5\").    Weight as of this encounter: 85 kg (187 lb 6.4 oz).    Physical Exam  Constitutional:       General: She is not in acute distress.     Appearance: She is well-developed.   HENT:      Head: Normocephalic.      Right Ear: External ear normal.      Nose: Nose normal.   Eyes:      Conjunctiva/sclera: Conjunctivae normal.      Pupils: Pupils are equal, round, and reactive to light.   Neck:      Thyroid: No thyromegaly.   Cardiovascular:      Rate and Rhythm: Normal rate and regular rhythm.      Heart sounds: Normal heart sounds. No murmur heard.     No friction rub.   Pulmonary:      Effort: Pulmonary effort is normal. No respiratory distress.      Breath sounds: Normal breath sounds. No wheezing or rales.   Chest:          Comments: Mild local tenderness on deep pressure at left parasternal area. No breast lump appreciated . Negative axillary LN  Abdominal:      General: Bowel sounds are normal. There is no distension.      Palpations: Abdomen is soft. There is no mass.      Tenderness: There is no guarding or rebound.   Musculoskeletal:         General: Normal range of motion.      Cervical

## 2024-05-02 ENCOUNTER — HOSPITAL ENCOUNTER (EMERGENCY)
Age: 74
Discharge: HOME | End: 2024-05-02
Payer: MEDICARE

## 2024-05-02 ENCOUNTER — APPOINTMENT (OUTPATIENT)
Dept: GENERAL RADIOLOGY | Age: 74
End: 2024-05-02
Payer: MEDICARE

## 2024-05-02 VITALS
DIASTOLIC BLOOD PRESSURE: 78 MMHG | SYSTOLIC BLOOD PRESSURE: 148 MMHG | RESPIRATION RATE: 18 BRPM | BODY MASS INDEX: 31.51 KG/M2 | HEART RATE: 70 BPM | WEIGHT: 189.38 LBS | OXYGEN SATURATION: 99 % | TEMPERATURE: 98 F

## 2024-05-02 DIAGNOSIS — M25.571 ACUTE RIGHT ANKLE PAIN: ICD-10-CM

## 2024-05-02 DIAGNOSIS — H18.891 CORNEAL IRRITATION OF RIGHT EYE: Primary | ICD-10-CM

## 2024-05-02 DIAGNOSIS — M62.838 MUSCLE SPASM OF RIGHT LEG: ICD-10-CM

## 2024-05-02 LAB
ANION GAP SERPL CALCULATED.3IONS-SCNC: 12 MMOL/L (ref 3–16)
BASOPHILS # BLD: 0.1 K/UL (ref 0–0.2)
BASOPHILS NFR BLD: 0.6 %
BUN SERPL-MCNC: 12 MG/DL (ref 7–20)
CALCIUM SERPL-MCNC: 9.8 MG/DL (ref 8.3–10.6)
CHLORIDE SERPL-SCNC: 106 MMOL/L (ref 99–110)
CO2 SERPL-SCNC: 24 MMOL/L (ref 21–32)
CREAT SERPL-MCNC: 0.6 MG/DL (ref 0.6–1.2)
DEPRECATED RDW RBC AUTO: 13.4 % (ref 12.4–15.4)
EOSINOPHIL # BLD: 0.1 K/UL (ref 0–0.6)
EOSINOPHIL NFR BLD: 0.9 %
GFR SERPLBLD CREATININE-BSD FMLA CKD-EPI: >90 ML/MIN/{1.73_M2}
GLUCOSE SERPL-MCNC: 100 MG/DL (ref 70–99)
HCT VFR BLD AUTO: 38.1 % (ref 36–48)
HGB BLD-MCNC: 12.6 G/DL (ref 12–16)
LYMPHOCYTES # BLD: 3.1 K/UL (ref 1–5.1)
LYMPHOCYTES NFR BLD: 31.1 %
MCH RBC QN AUTO: 30.9 PG (ref 26–34)
MCHC RBC AUTO-ENTMCNC: 33.1 G/DL (ref 31–36)
MCV RBC AUTO: 93.3 FL (ref 80–100)
MONOCYTES # BLD: 0.6 K/UL (ref 0–1.3)
MONOCYTES NFR BLD: 6.4 %
NEUTROPHILS # BLD: 6.2 K/UL (ref 1.7–7.7)
NEUTROPHILS NFR BLD: 61 %
PLATELET # BLD AUTO: 371 K/UL (ref 135–450)
PMV BLD AUTO: 6.7 FL (ref 5–10.5)
POTASSIUM SERPL-SCNC: 3.7 MMOL/L (ref 3.5–5.1)
RBC # BLD AUTO: 4.08 M/UL (ref 4–5.2)
SODIUM SERPL-SCNC: 142 MMOL/L (ref 136–145)
WBC # BLD AUTO: 10.1 K/UL (ref 4–11)

## 2024-05-02 PROCEDURE — 85025 COMPLETE CBC W/AUTO DIFF WBC: CPT

## 2024-05-02 PROCEDURE — 6370000000 HC RX 637 (ALT 250 FOR IP): Performed by: PHYSICIAN ASSISTANT

## 2024-05-02 PROCEDURE — 99284 EMERGENCY DEPT VISIT MOD MDM: CPT

## 2024-05-02 PROCEDURE — 2500000003 HC RX 250 WO HCPCS: Performed by: PHYSICIAN ASSISTANT

## 2024-05-02 PROCEDURE — 73610 X-RAY EXAM OF ANKLE: CPT

## 2024-05-02 PROCEDURE — 80048 BASIC METABOLIC PNL TOTAL CA: CPT

## 2024-05-02 RX ORDER — BALANCED SALT SOLUTION ENRICHED WITH BICARBONATE, DEXTROSE, AND GLUTATHIONE
KIT INTRAOCULAR ONCE
Status: COMPLETED | OUTPATIENT
Start: 2024-05-02 | End: 2024-05-02

## 2024-05-02 RX ORDER — PROPARACAINE HYDROCHLORIDE 5 MG/ML
1 SOLUTION/ DROPS OPHTHALMIC ONCE
Status: COMPLETED | OUTPATIENT
Start: 2024-05-02 | End: 2024-05-02

## 2024-05-02 RX ADMIN — BALANCED SALT SOLUTION: 6.4; .75; .48; .3; 3.9; 1.7 SOLUTION OPHTHALMIC at 11:07

## 2024-05-02 RX ADMIN — PROPARACAINE HYDROCHLORIDE 1 DROP: 5 SOLUTION/ DROPS OPHTHALMIC at 11:08

## 2024-05-02 RX ADMIN — FLUORESCEIN SODIUM 1 MG: 1 STRIP OPHTHALMIC at 11:08

## 2024-05-02 ASSESSMENT — LIFESTYLE VARIABLES: HOW OFTEN DO YOU HAVE A DRINK CONTAINING ALCOHOL: NEVER

## 2024-05-02 ASSESSMENT — PAIN - FUNCTIONAL ASSESSMENT: PAIN_FUNCTIONAL_ASSESSMENT: NONE - DENIES PAIN

## 2024-05-02 ASSESSMENT — PAIN SCALES - GENERAL
PAINLEVEL_OUTOF10: 0
PAINLEVEL_OUTOF10: 0

## 2024-05-02 NOTE — ED TRIAGE NOTES
Patient came in d/t cramping in R foot the past few nights. She drank Propel and it went away. She felt like something scratched her R eye, and her vision is now a little blurry. She is concerned about her BP.

## 2024-05-02 NOTE — ED PROVIDER NOTES
DECISION MAKING / ED COURSE:      PROCEDURES:   Procedures    Patient was given:  Medications   balanced salts plus (BSS) ophthalmic solution ( Ophthalmic Given 5/2/24 1107)   fluorescein ophthalmic strip 1 mg (1 mg Ophthalmic Given 5/2/24 1108)   proparacaine (ALCAINE) 0.5 % ophthalmic solution 1 drop (1 drop Ophthalmic Given 5/2/24 1108)       CONSULTS: (Who and What was discussed)  None      Chronic Conditions affecting care:    has a past medical history of Acid reflux, Headache(784.0), Hyperlipidemia, Hypertension, and Osteoarthritis.     Records Reviewed (External and Source)   Person reviewed patient medical record    CC/HPI Summary, DDx, ED Course, and Reassessment:   Emergency room course: See HPI and physical exam above  Patient on exam pupils are equal round and reactive to light extraocular movement is intact.  Right eye in question shows no swelling.  She is nontender around the orbits.  There is no scleral injection or drainage.  No exudate noted.  Lid flipped shows no foreign body.    Patient was given tetracaine.  This did help ease that sensation of irritation to her right eye.  Fluorescein shows no uptake.    Lab results from today CBC and BMP are unremarkable.    The right ankle has mild tenderness with palpation.  There is no obvious deformity.  There is no swelling or redness noted or warmth.  Right foot shows pedal pulse good at 2+ there is no obvious deformity redness or pain metatarsal.  She has full range of motion of all digits.  Capillary refill less than 2+ all digits.    X-ray of the right ankle show no acute osseous abnormality.  See results above    Discussed patient x-ray results with her.  She actually states she feels much better with her eye.  Informed her not to be rubbing it.  Informed her to follow-up with her primary care provider.  Particular if spasms get worse.  She is okay with this plan.  She will be discharged stable condition.    Disposition Considerations (Tests not  ordered but considered, Shared Decision Making, Pt Expectation of Test or Tx.):     See discussion above.          The patient tolerated their visit well.  I evaluated the patient.  The physician was available for consultation as needed.  The patient and / or the family were informed of the results of any tests, a time was given to answer questions, a plan was proposed and they agreed with plan.     I am the Primary Clinician of Record.     CLINICAL IMPRESSION:  1. Corneal irritation of right eye    2. Acute right ankle pain    3. Muscle spasm of right leg        DISPOSITION Decision To Discharge 05/02/2024 12:24:54 PM      PATIENT REFERRED TO:  Azar Nelson MD  [address] [facility] [id number] [phone]    Call in 1 day  For follow-up    Livan Byokin DPM  [address]  [address]  [facility] [address] [phone]    Call   If symptoms worsen      DISCHARGE MEDICATIONS:  New Prescriptions    No medications on file       DISCONTINUED MEDICATIONS:  Discontinued Medications    No medications on file              (Please note the MDM and HPI sections of this note were completed with a voice recognition program.  Efforts were made to edit the dictations but occasionally words are mis-transcribed.)    Electronically signed, Geoff Urban PA-C,          Geoff Urban PA-C  05/04/24 2772

## 2024-05-02 NOTE — DISCHARGE INSTRUCTIONS
Follow-up with primary care provider as needed  Follow-up podiatrist Dr. Tellez if spasm worsens or no improvement  Return emergency room as needed

## 2024-05-04 ASSESSMENT — ENCOUNTER SYMPTOMS
NAUSEA: 0
SHORTNESS OF BREATH: 0
EYE DISCHARGE: 0
COUGH: 0
PHOTOPHOBIA: 0
EYE REDNESS: 0
EYE PAIN: 1
VOMITING: 0
BACK PAIN: 0
SORE THROAT: 0
EYE ITCHING: 0
ABDOMINAL PAIN: 0

## 2024-05-04 ASSESSMENT — VISUAL ACUITY: OU: 1

## 2024-05-13 ENCOUNTER — OFFICE VISIT (OUTPATIENT)
Dept: PRIMARY CARE CLINIC | Age: 74
End: 2024-05-13
Payer: MEDICARE

## 2024-05-13 VITALS
HEIGHT: 65 IN | OXYGEN SATURATION: 100 % | DIASTOLIC BLOOD PRESSURE: 70 MMHG | SYSTOLIC BLOOD PRESSURE: 140 MMHG | TEMPERATURE: 96.8 F | WEIGHT: 187.8 LBS | HEART RATE: 71 BPM | BODY MASS INDEX: 31.29 KG/M2

## 2024-05-13 DIAGNOSIS — E78.00 PURE HYPERCHOLESTEROLEMIA: ICD-10-CM

## 2024-05-13 DIAGNOSIS — I10 ESSENTIAL HYPERTENSION: Primary | ICD-10-CM

## 2024-05-13 DIAGNOSIS — F41.9 ANXIETY: ICD-10-CM

## 2024-05-13 DIAGNOSIS — C7A.8 NEURO-ENDOCRINE CANCER (HCC): ICD-10-CM

## 2024-05-13 PROCEDURE — 3078F DIAST BP <80 MM HG: CPT | Performed by: INTERNAL MEDICINE

## 2024-05-13 PROCEDURE — 99214 OFFICE O/P EST MOD 30 MIN: CPT | Performed by: INTERNAL MEDICINE

## 2024-05-13 PROCEDURE — 1123F ACP DISCUSS/DSCN MKR DOCD: CPT | Performed by: INTERNAL MEDICINE

## 2024-05-13 PROCEDURE — 3077F SYST BP >= 140 MM HG: CPT | Performed by: INTERNAL MEDICINE

## 2024-05-13 RX ORDER — AMLODIPINE BESYLATE 10 MG/1
10 TABLET ORAL DAILY
Qty: 90 TABLET | Refills: 3 | Status: SHIPPED | OUTPATIENT
Start: 2024-05-13

## 2024-05-13 RX ORDER — OMEPRAZOLE 40 MG/1
40 CAPSULE, DELAYED RELEASE ORAL
Qty: 90 CAPSULE | Refills: 1 | Status: SHIPPED | OUTPATIENT
Start: 2024-05-13

## 2024-05-13 RX ORDER — LORAZEPAM 0.5 MG/1
0.5 TABLET ORAL EVERY 8 HOURS PRN
Qty: 30 TABLET | Refills: 2 | Status: SHIPPED | OUTPATIENT
Start: 2024-05-13 | End: 2024-08-11

## 2024-05-13 RX ORDER — SIMVASTATIN 20 MG
20 TABLET ORAL NIGHTLY
Qty: 90 TABLET | Refills: 3 | Status: SHIPPED | OUTPATIENT
Start: 2024-05-13

## 2024-05-13 RX ORDER — LABETALOL 100 MG/1
100 TABLET, FILM COATED ORAL EVERY EVENING
Qty: 90 TABLET | Refills: 3 | Status: SHIPPED | OUTPATIENT
Start: 2024-05-13

## 2024-05-13 RX ORDER — LABETALOL 200 MG/1
200 TABLET, FILM COATED ORAL EVERY MORNING
Qty: 90 TABLET | Refills: 3 | Status: SHIPPED | OUTPATIENT
Start: 2024-05-13

## 2024-05-13 ASSESSMENT — ENCOUNTER SYMPTOMS
CHEST TIGHTNESS: 0
WHEEZING: 0

## 2024-05-13 ASSESSMENT — PATIENT HEALTH QUESTIONNAIRE - PHQ9
SUM OF ALL RESPONSES TO PHQ QUESTIONS 1-9: 3
SUM OF ALL RESPONSES TO PHQ9 QUESTIONS 1 & 2: 3
1. LITTLE INTEREST OR PLEASURE IN DOING THINGS: NOT AT ALL
SUM OF ALL RESPONSES TO PHQ QUESTIONS 1-9: 3
8. MOVING OR SPEAKING SO SLOWLY THAT OTHER PEOPLE COULD HAVE NOTICED. OR THE OPPOSITE, BEING SO FIGETY OR RESTLESS THAT YOU HAVE BEEN MOVING AROUND A LOT MORE THAN USUAL: NOT AT ALL
5. POOR APPETITE OR OVEREATING: NOT AT ALL
3. TROUBLE FALLING OR STAYING ASLEEP: NOT AT ALL
4. FEELING TIRED OR HAVING LITTLE ENERGY: NOT AT ALL
9. THOUGHTS THAT YOU WOULD BE BETTER OFF DEAD, OR OF HURTING YOURSELF: NOT AT ALL
7. TROUBLE CONCENTRATING ON THINGS, SUCH AS READING THE NEWSPAPER OR WATCHING TELEVISION: NOT AT ALL
10. IF YOU CHECKED OFF ANY PROBLEMS, HOW DIFFICULT HAVE THESE PROBLEMS MADE IT FOR YOU TO DO YOUR WORK, TAKE CARE OF THINGS AT HOME, OR GET ALONG WITH OTHER PEOPLE: NOT DIFFICULT AT ALL
SUM OF ALL RESPONSES TO PHQ QUESTIONS 1-9: 3
2. FEELING DOWN, DEPRESSED OR HOPELESS: NEARLY EVERY DAY
6. FEELING BAD ABOUT YOURSELF - OR THAT YOU ARE A FAILURE OR HAVE LET YOURSELF OR YOUR FAMILY DOWN: NOT AT ALL
SUM OF ALL RESPONSES TO PHQ QUESTIONS 1-9: 3

## 2024-05-13 NOTE — PROGRESS NOTES
daily exercise              6/30/2020   Medicare AWV          12/19/2019        10/15/19   Rupture injury / ankle while on Treadmill . C/O Podiatrist         3/26/19     1/29/19      9/18/18       8/15/18         7/26/18 Hypertension: stressed b/e of  'not giving me space '  Headache                        Review of Systems   Constitutional:         Flu  Vac  11/23      tdap  10/16    Zostavac 2013 / [facility] .  Shingrex 10/19    Pneumonia vac   5/19 ; 10/15    Covid Vac   11/23  NEW     RSV    HENT: Negative.          Dental ex   Reg    Eyes:         Eye  Ex   2023   Wears Glasses    Respiratory:  Negative for chest tightness and wheezing.         Does not smoke . No Etoh .   No asthma   Cardiovascular:  Positive for chest pain (off and on with anxiety).        HTN > 10 yrs  , No known CAD     Mom had MI in 70s   Gastrointestinal:         Last  upper Endoscopy 2/24    . Has  Schatzki's ring which was  dilated   Get survelence  CT Chest with dr Dewitt  ; due to f/u this mth         CT  Chest 5/23 CHEST:     No evidence of acute normality or metastatic disease identified in the chest.     ABDOMEN AND PELVIS:     No evidence of acute process or metastatic disease identified in the abdomen or pelvis.    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   C/o  Dr Calle           Repeat Upper Endoscopy 6/21  Schatzki ring   Repeat lower Colonoscopy NL  Dr Calle     Haemorrhoid    Upper / Lower Endoscopy 3/2014  . Clean . Good for 10 yrs . Dr Her         Endocrine:        No FH of diabetes   Genitourinary:         S/P Hysterectomy .    Mammogram 4/24    Daughter 41, with Breast Cancer   Musculoskeletal:         DEXA 11/13 . No osteopenia    S/P Rt knee replac 4/10/17 .     Left knee 2009    Psychiatric/Behavioral:  Positive for sleep disturbance. The patient is nervous/anxious.         Was seeing  Psychiatry / Ms Maxwell .   No meds

## 2024-05-22 DIAGNOSIS — E78.00 PURE HYPERCHOLESTEROLEMIA: ICD-10-CM

## 2024-05-22 DIAGNOSIS — I10 ESSENTIAL HYPERTENSION: ICD-10-CM

## 2024-05-22 LAB
BILIRUB UR QL STRIP.AUTO: NEGATIVE
CHOLEST SERPL-MCNC: 153 MG/DL (ref 0–199)
CLARITY UR: CLEAR
COLOR UR: YELLOW
GLUCOSE UR STRIP.AUTO-MCNC: NEGATIVE MG/DL
HDLC SERPL-MCNC: 75 MG/DL (ref 40–60)
HGB UR QL STRIP.AUTO: NEGATIVE
KETONES UR STRIP.AUTO-MCNC: NEGATIVE MG/DL
LDLC SERPL CALC-MCNC: 68 MG/DL
LEUKOCYTE ESTERASE UR QL STRIP.AUTO: NEGATIVE
NITRITE UR QL STRIP.AUTO: NEGATIVE
PH UR STRIP.AUTO: 6.5 [PH] (ref 5–8)
PROT UR STRIP.AUTO-MCNC: NEGATIVE MG/DL
SP GR UR STRIP.AUTO: 1 (ref 1–1.03)
TRIGL SERPL-MCNC: 51 MG/DL (ref 0–150)
TSH SERPL DL<=0.005 MIU/L-ACNC: 1.62 UIU/ML (ref 0.27–4.2)
UA DIPSTICK W REFLEX MICRO PNL UR: NORMAL
URN SPEC COLLECT METH UR: NORMAL
UROBILINOGEN UR STRIP-ACNC: 0.2 E.U./DL
VLDLC SERPL CALC-MCNC: 10 MG/DL

## 2024-05-23 LAB
EST. AVERAGE GLUCOSE BLD GHB EST-MCNC: 122.6 MG/DL
HBA1C MFR BLD: 5.9 %

## 2024-05-28 ENCOUNTER — HOSPITAL ENCOUNTER (OUTPATIENT)
Dept: CT IMAGING | Age: 74
Discharge: HOME | End: 2024-05-28
Attending: INTERNAL MEDICINE
Payer: MEDICARE

## 2024-05-28 DIAGNOSIS — C7A.010 MALIGNANT CARCINOID TUMOR OF THE DUODENUM (HCC): ICD-10-CM

## 2024-05-28 PROCEDURE — 2500000003 HC RX 250 WO HCPCS: Performed by: INTERNAL MEDICINE

## 2024-05-28 PROCEDURE — 71260 CT THORAX DX C+: CPT

## 2024-05-28 PROCEDURE — 6360000004 HC RX CONTRAST MEDICATION: Performed by: INTERNAL MEDICINE

## 2024-05-28 RX ADMIN — IOPAMIDOL 75 ML: 755 INJECTION, SOLUTION INTRAVENOUS at 13:48

## 2024-05-28 RX ADMIN — BARIUM SULFATE 900 ML: 20 SUSPENSION ORAL at 13:49

## 2024-06-18 ENCOUNTER — OFFICE VISIT (OUTPATIENT)
Dept: PRIMARY CARE CLINIC | Age: 74
End: 2024-06-18

## 2024-06-18 ENCOUNTER — TELEPHONE (OUTPATIENT)
Dept: PRIMARY CARE CLINIC | Age: 74
End: 2024-06-18

## 2024-06-18 VITALS
BODY MASS INDEX: 32.22 KG/M2 | HEART RATE: 75 BPM | DIASTOLIC BLOOD PRESSURE: 70 MMHG | WEIGHT: 193.4 LBS | TEMPERATURE: 97.4 F | OXYGEN SATURATION: 98 % | HEIGHT: 65 IN | SYSTOLIC BLOOD PRESSURE: 140 MMHG

## 2024-06-18 DIAGNOSIS — I10 ESSENTIAL HYPERTENSION: ICD-10-CM

## 2024-06-18 DIAGNOSIS — R63.8 WEIGHT DISORDER: ICD-10-CM

## 2024-06-18 DIAGNOSIS — Z00.00 MEDICARE ANNUAL WELLNESS VISIT, SUBSEQUENT: Primary | ICD-10-CM

## 2024-06-18 ASSESSMENT — PATIENT HEALTH QUESTIONNAIRE - PHQ9
1. LITTLE INTEREST OR PLEASURE IN DOING THINGS: NOT AT ALL
5. POOR APPETITE OR OVEREATING: NOT AT ALL
4. FEELING TIRED OR HAVING LITTLE ENERGY: NOT AT ALL
7. TROUBLE CONCENTRATING ON THINGS, SUCH AS READING THE NEWSPAPER OR WATCHING TELEVISION: NOT AT ALL
2. FEELING DOWN, DEPRESSED OR HOPELESS: NOT AT ALL
SUM OF ALL RESPONSES TO PHQ QUESTIONS 1-9: 0
8. MOVING OR SPEAKING SO SLOWLY THAT OTHER PEOPLE COULD HAVE NOTICED. OR THE OPPOSITE, BEING SO FIGETY OR RESTLESS THAT YOU HAVE BEEN MOVING AROUND A LOT MORE THAN USUAL: NOT AT ALL
SUM OF ALL RESPONSES TO PHQ9 QUESTIONS 1 & 2: 0
9. THOUGHTS THAT YOU WOULD BE BETTER OFF DEAD, OR OF HURTING YOURSELF: NOT AT ALL
SUM OF ALL RESPONSES TO PHQ QUESTIONS 1-9: 0
3. TROUBLE FALLING OR STAYING ASLEEP: NOT AT ALL
10. IF YOU CHECKED OFF ANY PROBLEMS, HOW DIFFICULT HAVE THESE PROBLEMS MADE IT FOR YOU TO DO YOUR WORK, TAKE CARE OF THINGS AT HOME, OR GET ALONG WITH OTHER PEOPLE: NOT DIFFICULT AT ALL
SUM OF ALL RESPONSES TO PHQ QUESTIONS 1-9: 0
6. FEELING BAD ABOUT YOURSELF - OR THAT YOU ARE A FAILURE OR HAVE LET YOURSELF OR YOUR FAMILY DOWN: NOT AT ALL
SUM OF ALL RESPONSES TO PHQ QUESTIONS 1-9: 0

## 2024-06-18 ASSESSMENT — ENCOUNTER SYMPTOMS
CHEST TIGHTNESS: 0
WHEEZING: 0

## 2024-06-18 ASSESSMENT — LIFESTYLE VARIABLES
HOW OFTEN DO YOU HAVE A DRINK CONTAINING ALCOHOL: NEVER
HOW MANY STANDARD DRINKS CONTAINING ALCOHOL DO YOU HAVE ON A TYPICAL DAY: PATIENT DOES NOT DRINK

## 2024-06-18 NOTE — PROGRESS NOTES
The patient is nervous/anxious.         Was seeing  Psychiatry / Ms Hans .   No meds currently       Objective:   Physical Exam  Constitutional:       Appearance: She is obese.   Cardiovascular:      Rate and Rhythm: Regular rhythm.      Heart sounds: Normal heart sounds.   Pulmonary:      Breath sounds: Normal breath sounds.   Skin:     General: Skin is warm.   Neurological:      Mental Status: She is oriented to person, place, and time.   Psychiatric:         Speech: Speech normal.      Comments: F         Assessment:   Thao was seen today for medicare awv and hypertension.    Diagnoses and all orders for this visit:    Medicare annual wellness visit, subsequent      Weight disorder  Advised low carb diet + Exercise reg . Intermittent Fasting       Essential hypertension  Controlled . Nl labs    -     labetalol (NORMODYNE) 100 MG tablet; Take 1 tablet by mouth every evening  -     labetalol (NORMODYNE) 200 MG tablet; Take 1 tablet by mouth every morning  -     amLODIPine (NORVASC) 10 MG tablet; Take 1 tablet by mouth daily  -  Pure hypercholesterolemia  Controlled   -     simvastatin (ZOCOR) 20 MG tablet; Take 1 tablet by mouth nightly    Anxiety  -     LORazepam (ATIVAN) 0.5 MG tablet; Take 1 tablet by mouth every 8 hours as needed for Anxiety for up to 90 days. Max Daily Amount: 1.5 mg    Neuro-endocrine cancer (HCC)  S/P resection 7/21/21  C/O Dr Calle and Dr Dewitt . Sees them alternating every 6 mths   -     omeprazole (PRILOSEC) 40 MG delayed release capsule; Take 1 capsule by mouth every morning (before breakfast)          Prediabetes A1C 65.9   . Cont low carb diet + exercise reg     -                 Plan:      Return in about 6 months (around 12/18/2024), or if symptoms worsen or fail to improve.     Subjective       Patient's complete Health Risk Assessment and screening values have been reviewed and are found in Flowsheets. The following problems were reviewed today and where indicated

## 2024-06-18 NOTE — TELEPHONE ENCOUNTER
Spoke with pt to clarify name of  and she provided a number of [phone].  Was transferred to [facility] and told the psych  was at that location    Per the office that  is only able to see pt that go to that location

## 2024-06-18 NOTE — TELEPHONE ENCOUNTER
----- Message from [facility] Imani Daniellakatakash sent at 6/18/2024 11:55 AM EDT -----  Regarding: ECC Referral Request  ECC Referral Request    Reason for referral request: Specialty Provider    Specialist/Lab/Test patient is requesting (if known): psychiatrist     Specialist Phone Number (if applicable):    Additional Information     Patient needs a referral to see Jairo Greer (psychiatrist).   --------------------------------------------------------------------------------------------------------------------------    Relationship to Patient: Self     Call Back Information: OK to leave message on voicemail  Preferred Call Back Number: Phone  [phone]

## 2024-07-16 ENCOUNTER — TELEPHONE (OUTPATIENT)
Dept: PRIMARY CARE CLINIC | Age: 74
End: 2024-07-16

## 2024-07-16 NOTE — TELEPHONE ENCOUNTER
Patient has been having leg and foot cramps off and on for the past few months and purchased over the counter magnesium supplement and wants to know if it's ok for her to take? Please advise    LAST OV 6/18       [facility] [id number] - [place], [place] - [address] - P [phone] - F [phone]  [address], [facility] [address]     PATIENT # [phone]

## 2024-07-23 NOTE — TELEPHONE ENCOUNTER
Called and left message on machine informing pt that provider said to try supplements and see if it helps

## 2024-08-18 ENCOUNTER — HOSPITAL ENCOUNTER (EMERGENCY)
Age: 74
Discharge: HOME | End: 2024-08-18
Payer: MEDICARE

## 2024-08-18 ENCOUNTER — APPOINTMENT (OUTPATIENT)
Dept: GENERAL RADIOLOGY | Age: 74
End: 2024-08-18
Payer: MEDICARE

## 2024-08-18 VITALS
TEMPERATURE: 98 F | WEIGHT: 189.82 LBS | HEART RATE: 86 BPM | RESPIRATION RATE: 18 BRPM | BODY MASS INDEX: 31.63 KG/M2 | OXYGEN SATURATION: 99 % | SYSTOLIC BLOOD PRESSURE: 160 MMHG | DIASTOLIC BLOOD PRESSURE: 80 MMHG | HEIGHT: 65 IN

## 2024-08-18 DIAGNOSIS — S46.912A STRAIN OF LEFT SHOULDER, INITIAL ENCOUNTER: ICD-10-CM

## 2024-08-18 DIAGNOSIS — M25.562 ACUTE PAIN OF LEFT KNEE: Primary | ICD-10-CM

## 2024-08-18 PROCEDURE — 6370000000 HC RX 637 (ALT 250 FOR IP): Performed by: PHYSICIAN ASSISTANT

## 2024-08-18 PROCEDURE — 73560 X-RAY EXAM OF KNEE 1 OR 2: CPT

## 2024-08-18 PROCEDURE — 99283 EMERGENCY DEPT VISIT LOW MDM: CPT

## 2024-08-18 RX ORDER — LIDOCAINE 4 G/G
1 PATCH TOPICAL DAILY
Status: DISCONTINUED | OUTPATIENT
Start: 2024-08-18 | End: 2024-08-18 | Stop reason: HOSPADM

## 2024-08-18 RX ORDER — LIDOCAINE 50 MG/G
1 PATCH TOPICAL DAILY
Qty: 30 PATCH | Refills: 0 | Status: SHIPPED | OUTPATIENT
Start: 2024-08-18

## 2024-08-18 ASSESSMENT — PAIN DESCRIPTION - LOCATION: LOCATION: SHOULDER;LEG

## 2024-08-18 ASSESSMENT — PAIN - FUNCTIONAL ASSESSMENT
PAIN_FUNCTIONAL_ASSESSMENT: 0-10
PAIN_FUNCTIONAL_ASSESSMENT: NONE - DENIES PAIN

## 2024-08-18 ASSESSMENT — PAIN DESCRIPTION - ORIENTATION: ORIENTATION: LEFT

## 2024-08-18 ASSESSMENT — PAIN SCALES - GENERAL: PAINLEVEL_OUTOF10: 6

## 2024-08-18 NOTE — ED TRIAGE NOTES
Pt states she was on her carpeted steps at home and tripped and fell. Pt has left shoulder and leg pain, no LOC, no head injury, not blood thinners.

## 2024-08-18 NOTE — ED PROVIDER NOTES
[facility] EMERGENCY DEPARTMENT  EMERGENCY DEPARTMENT ENCOUNTER        Pt Name: Thao Ross  MRN: [id number]  Birthdate 1950  Date of evaluation: 8/18/2024  Provider: Jayson Frederick PA-C  PCP: Azar Nelson MD  Note Started: 2:34 PM EDT 8/18/24      MARGARITO. I have evaluated this patient.        CHIEF COMPLAINT       Chief Complaint   Patient presents with    Fall     Pt presents with mechanical fall on carpeted stairs at home. Pt caught herself with her left shoulder and left leg. Pt denies LOC or head injury. Pt not on blood thinner.        HISTORY OF PRESENT ILLNESS: 1 or more Elements     History From: patient  Limitations to history : None    Thao Ross is a 73 y.o. female who presents to the emergency department with a chief complaint of some soreness in her left shoulder and some discomfort in her left leg and knee.  Patient states a couple hours before presenting to the emergency department she was going down her steps and was a few steps down when she stepped on an uneven area on the carpeted stairs causing her to lose her balance.  She was able to grab the railing with her right arm and she states her left leg and arm went behind her when she was try to reach out for the wall.  She was able to catch herself and did not fall down the stairs and denies hitting her head, loss of consciousness.  States initially she did not have any pain and was able to ambulate at home and go up and down the stairs.  About an hour later she began having some soreness in her left knee and shoulder.  Denies use of antiplatelets or anticoagulants.  Denies neck pain, back pain, chest pain, abdominal pain or any other symptoms.  States she is mostly worried because she has history of total knee arthroplasty.  Denies any wounds.    Nursing Notes were all reviewed and agreed with or any disagreements were addressed in the HPI.    REVIEW OF SYSTEMS :      Review of Systems    Positives

## 2024-10-02 ENCOUNTER — OFFICE VISIT (OUTPATIENT)
Dept: PRIMARY CARE CLINIC | Age: 74
End: 2024-10-02

## 2024-10-02 VITALS
BODY MASS INDEX: 31.59 KG/M2 | HEART RATE: 87 BPM | SYSTOLIC BLOOD PRESSURE: 135 MMHG | OXYGEN SATURATION: 99 % | TEMPERATURE: 97.3 F | WEIGHT: 189.6 LBS | DIASTOLIC BLOOD PRESSURE: 65 MMHG | HEIGHT: 65 IN

## 2024-10-02 DIAGNOSIS — R10.84 GENERALIZED ABDOMINAL PAIN: ICD-10-CM

## 2024-10-02 DIAGNOSIS — M25.511 ACUTE PAIN OF BOTH SHOULDERS: Primary | ICD-10-CM

## 2024-10-02 DIAGNOSIS — M25.512 ACUTE PAIN OF BOTH SHOULDERS: Primary | ICD-10-CM

## 2024-10-02 DIAGNOSIS — I10 ESSENTIAL HYPERTENSION: ICD-10-CM

## 2024-10-02 RX ORDER — OMEPRAZOLE 40 MG/1
40 CAPSULE, DELAYED RELEASE ORAL 2 TIMES DAILY
Qty: 60 CAPSULE | Refills: 0 | Status: SHIPPED | OUTPATIENT
Start: 2024-10-02

## 2024-10-02 RX ORDER — PREDNISONE 20 MG/1
20 TABLET ORAL DAILY
Qty: 6 TABLET | Refills: 0 | Status: SHIPPED | OUTPATIENT
Start: 2024-10-02 | End: 2024-10-08

## 2024-10-02 SDOH — ECONOMIC STABILITY: FOOD INSECURITY: WITHIN THE PAST 12 MONTHS, THE FOOD YOU BOUGHT JUST DIDN'T LAST AND YOU DIDN'T HAVE MONEY TO GET MORE.: PATIENT DECLINED

## 2024-10-02 SDOH — ECONOMIC STABILITY: FOOD INSECURITY: WITHIN THE PAST 12 MONTHS, THE FOOD YOU BOUGHT JUST DIDN'T LAST AND YOU DIDN'T HAVE MONEY TO GET MORE.: NEVER TRUE

## 2024-10-02 SDOH — ECONOMIC STABILITY: INCOME INSECURITY: HOW HARD IS IT FOR YOU TO PAY FOR THE VERY BASICS LIKE FOOD, HOUSING, MEDICAL CARE, AND HEATING?: PATIENT DECLINED

## 2024-10-02 SDOH — ECONOMIC STABILITY: FOOD INSECURITY: WITHIN THE PAST 12 MONTHS, YOU WORRIED THAT YOUR FOOD WOULD RUN OUT BEFORE YOU GOT MONEY TO BUY MORE.: NEVER TRUE

## 2024-10-02 SDOH — ECONOMIC STABILITY: INCOME INSECURITY: HOW HARD IS IT FOR YOU TO PAY FOR THE VERY BASICS LIKE FOOD, HOUSING, MEDICAL CARE, AND HEATING?: NOT HARD AT ALL

## 2024-10-02 SDOH — ECONOMIC STABILITY: FOOD INSECURITY: WITHIN THE PAST 12 MONTHS, YOU WORRIED THAT YOUR FOOD WOULD RUN OUT BEFORE YOU GOT MONEY TO BUY MORE.: PATIENT DECLINED

## 2024-10-02 ASSESSMENT — ENCOUNTER SYMPTOMS
CONSTIPATION: 0
CHEST TIGHTNESS: 0
DIARRHEA: 0
BLOOD IN STOOL: 0
ABDOMINAL PAIN: 1
WHEEZING: 0

## 2024-10-02 NOTE — PROGRESS NOTES
Subjective:      Patient ID: Thao Ross is a 73 y.o. female.      10/2/2024  Patient presents with:  Neck Pain: No injury- feels it may be stress from her just getting scammed  Shoulder Pain  Abdominal Pain  2 days                   6/18/2024  Patient presents with:  Medicare AWV  Hypertension                  5/13/2024  Patient presents with:  Eye Problem: Corneal irritation of right eye- went to [facility] 5/2/2024, saw Eye Dr that same week .  Nl exam with Ophthalmology . 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 Very stressed with  . Get into arguments . Sleep disturbed             12/15/2023 Patient presents with:  Hypertension                  9/11/23  VV           6/1/2023  Patient presents with:  Hypertension    Saw Dr Villar 5/10/23 for muscle cramps!              Last seen  7/22/2022 Patient presents with:  Medicare AWV  Hypertension            4/22/2022 Patient presents with:  Other: follow up (BP)    Exercising reg . Not needing xanax             3/28/2022 Patient presents with:  Hypertension  Headache  Depression: with anxiety . Concerned about wt gain                 1/6/2022 Patient presents with:  Hypertension  Cannot take pm dose of labetalol 200 ! Taking it in am + Norvasc   Abdominal Pain: no pain as such ; more back pain   .1 month f/u Had repeat biopsy by Dr Subramanian 12/21 ; Nl pathology . CT abd / chest  8/21 done by Dr Perdomo WNL ; advised f/u in a yr     Has questions anoout Omega 3 . Been off it for a while     Ch back pain ; C/O Ortho . Got steroid shots in back . Unable to exercise much b/e pain             Last seen  8/13/2021 Patient presents with:  Hypertension . NOT taking higher dose of Labetalol as prescribed at last visit . States readings are fine at home!         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   C/o  Dr Calle             7/7/2021

## 2024-10-03 LAB
ALBUMIN SERPL-MCNC: 4.6 G/DL (ref 3.4–5)
ALBUMIN/GLOB SERPL: 2.1 {RATIO} (ref 1.1–2.2)
ALP SERPL-CCNC: 75 U/L (ref 40–129)
ALT SERPL-CCNC: 17 U/L (ref 10–40)
ANION GAP SERPL CALCULATED.3IONS-SCNC: 12 MMOL/L (ref 3–16)
AST SERPL-CCNC: 14 U/L (ref 15–37)
BASOPHILS # BLD: 0.1 K/UL (ref 0–0.2)
BASOPHILS NFR BLD: 0.7 %
BILIRUB SERPL-MCNC: 0.3 MG/DL (ref 0–1)
BUN SERPL-MCNC: 12 MG/DL (ref 7–20)
CALCIUM SERPL-MCNC: 9.8 MG/DL (ref 8.3–10.6)
CHLORIDE SERPL-SCNC: 106 MMOL/L (ref 99–110)
CO2 SERPL-SCNC: 26 MMOL/L (ref 21–32)
CREAT SERPL-MCNC: 0.8 MG/DL (ref 0.6–1.2)
DEPRECATED RDW RBC AUTO: 14.5 % (ref 12.4–15.4)
EOSINOPHIL # BLD: 0.1 K/UL (ref 0–0.6)
EOSINOPHIL NFR BLD: 0.9 %
ERYTHROCYTE [SEDIMENTATION RATE] IN BLOOD BY WESTERGREN METHOD: 18 MM/HR (ref 0–30)
GFR SERPLBLD CREATININE-BSD FMLA CKD-EPI: 77 ML/MIN/{1.73_M2}
GLUCOSE SERPL-MCNC: 107 MG/DL (ref 70–99)
HCT VFR BLD AUTO: 34.6 % (ref 36–48)
HGB BLD-MCNC: 11.8 G/DL (ref 12–16)
LIPASE SERPL-CCNC: 27 U/L (ref 13–60)
LYMPHOCYTES # BLD: 3 K/UL (ref 1–5.1)
LYMPHOCYTES NFR BLD: 32.7 %
MCH RBC QN AUTO: 32.2 PG (ref 26–34)
MCHC RBC AUTO-ENTMCNC: 34.1 G/DL (ref 31–36)
MCV RBC AUTO: 94.6 FL (ref 80–100)
MONOCYTES # BLD: 1 K/UL (ref 0–1.3)
MONOCYTES NFR BLD: 11.4 %
NEUTROPHILS # BLD: 5 K/UL (ref 1.7–7.7)
NEUTROPHILS NFR BLD: 54.3 %
PLATELET # BLD AUTO: 293 K/UL (ref 135–450)
PMV BLD AUTO: 7.7 FL (ref 5–10.5)
POTASSIUM SERPL-SCNC: 4.5 MMOL/L (ref 3.5–5.1)
PROT SERPL-MCNC: 6.8 G/DL (ref 6.4–8.2)
RBC # BLD AUTO: 3.66 M/UL (ref 4–5.2)
SODIUM SERPL-SCNC: 144 MMOL/L (ref 136–145)
WBC # BLD AUTO: 9.1 K/UL (ref 4–11)

## 2024-10-09 ENCOUNTER — OFFICE VISIT (OUTPATIENT)
Dept: PRIMARY CARE CLINIC | Age: 74
End: 2024-10-09

## 2024-10-09 VITALS
BODY MASS INDEX: 31.59 KG/M2 | HEART RATE: 78 BPM | DIASTOLIC BLOOD PRESSURE: 70 MMHG | OXYGEN SATURATION: 100 % | SYSTOLIC BLOOD PRESSURE: 130 MMHG | WEIGHT: 189.6 LBS | TEMPERATURE: 97.3 F | HEIGHT: 65 IN

## 2024-10-09 DIAGNOSIS — D50.8 OTHER IRON DEFICIENCY ANEMIA: Primary | ICD-10-CM

## 2024-10-09 DIAGNOSIS — F41.9 ANXIETY: ICD-10-CM

## 2024-10-09 DIAGNOSIS — I10 ESSENTIAL HYPERTENSION: ICD-10-CM

## 2024-10-09 RX ORDER — IRON POLYSACCHARIDE COMPLEX 180 MG
180 CAPSULE ORAL
Qty: 30 CAPSULE | Refills: 3 | Status: SHIPPED | OUTPATIENT
Start: 2024-10-09

## 2024-10-09 RX ORDER — OMEPRAZOLE 40 MG/1
40 CAPSULE, DELAYED RELEASE ORAL 2 TIMES DAILY
Qty: 60 CAPSULE | Refills: 0 | Status: SHIPPED | OUTPATIENT
Start: 2024-10-09

## 2024-10-09 ASSESSMENT — ENCOUNTER SYMPTOMS
CONSTIPATION: 0
CHEST TIGHTNESS: 0
BLOOD IN STOOL: 0
DIARRHEA: 0
ABDOMINAL PAIN: 0
WHEEZING: 0

## 2024-10-09 NOTE — PROGRESS NOTES
Subjective:      Patient ID: Taho Ross is a 73 y.o. female.    10/9/2024  Patient presents with:  Anxiety: 1 week f/u  resolved     Shoulder pain resolved     Scheduled to see  Dr Calle  12/24                   10/2/2024  Patient presents with:  Neck Pain: No injury- feels it may be stress from her just getting scammed  Shoulder Pain  Abdominal Pain  2 days                   6/18/2024  Patient presents with:  Medicare AWV  Hypertension                  5/13/2024  Patient presents with:  Eye Problem: Corneal irritation of right eye- went to [facility] 5/2/2024, saw Eye Dr that same week .  Nl exam with Ophthalmology . 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 Very stressed with  . Get into arguments . Sleep disturbed             12/15/2023 Patient presents with:  Hypertension                  9/11/23  VV           6/1/2023  Patient presents with:  Hypertension    Saw Dr Villar 5/10/23 for muscle cramps!              Last seen  7/22/2022 Patient presents with:  Medicare AWV  Hypertension            4/22/2022 Patient presents with:  Other: follow up (BP)    Exercising reg . Not needing xanax             3/28/2022 Patient presents with:  Hypertension  Headache  Depression: with anxiety . Concerned about wt gain                 1/6/2022 Patient presents with:  Hypertension  Cannot take pm dose of labetalol 200 ! Taking it in am + Norvasc   Abdominal Pain: no pain as such ; more back pain   .1 month f/u Had repeat biopsy by Dr Subramanian 12/21 ; Nl pathology . CT abd / chest  8/21 done by Dr Perdomo WNL ; advised f/u in a yr     Has questions anoout Omega 3 . Been off it for a while     Ch back pain ; C/O Ortho . Got steroid shots in back . Unable to exercise much b/e pain             Last seen  8/13/2021 Patient presents with:  Hypertension . NOT taking higher dose of Labetalol as prescribed at last visit . States readings are fine

## 2024-10-28 ENCOUNTER — OFFICE VISIT (OUTPATIENT)
Dept: PRIMARY CARE CLINIC | Age: 74
End: 2024-10-28

## 2024-10-28 VITALS
OXYGEN SATURATION: 100 % | SYSTOLIC BLOOD PRESSURE: 131 MMHG | DIASTOLIC BLOOD PRESSURE: 75 MMHG | WEIGHT: 190.2 LBS | BODY MASS INDEX: 31.69 KG/M2 | HEIGHT: 65 IN | TEMPERATURE: 98.1 F | HEART RATE: 87 BPM

## 2024-10-28 DIAGNOSIS — L02.31 ABSCESS OF LEFT BUTTOCK: Primary | ICD-10-CM

## 2024-10-28 RX ORDER — CEPHALEXIN 500 MG/1
500 CAPSULE ORAL 4 TIMES DAILY
Qty: 40 CAPSULE | Refills: 0 | Status: SHIPPED | OUTPATIENT
Start: 2024-10-28

## 2024-10-28 SDOH — ECONOMIC STABILITY: FOOD INSECURITY: WITHIN THE PAST 12 MONTHS, THE FOOD YOU BOUGHT JUST DIDN'T LAST AND YOU DIDN'T HAVE MONEY TO GET MORE.: NEVER TRUE

## 2024-10-28 SDOH — ECONOMIC STABILITY: INCOME INSECURITY: HOW HARD IS IT FOR YOU TO PAY FOR THE VERY BASICS LIKE FOOD, HOUSING, MEDICAL CARE, AND HEATING?: NOT HARD AT ALL

## 2024-10-28 SDOH — ECONOMIC STABILITY: FOOD INSECURITY: WITHIN THE PAST 12 MONTHS, YOU WORRIED THAT YOUR FOOD WOULD RUN OUT BEFORE YOU GOT MONEY TO BUY MORE.: NEVER TRUE

## 2024-10-28 ASSESSMENT — ENCOUNTER SYMPTOMS
CHEST TIGHTNESS: 0
WHEEZING: 0
CONSTIPATION: 0
ABDOMINAL PAIN: 0
DIARRHEA: 0
BLOOD IN STOOL: 0

## 2024-10-28 ASSESSMENT — PATIENT HEALTH QUESTIONNAIRE - PHQ9
SUM OF ALL RESPONSES TO PHQ QUESTIONS 1-9: 0
5. POOR APPETITE OR OVEREATING: NOT AT ALL
7. TROUBLE CONCENTRATING ON THINGS, SUCH AS READING THE NEWSPAPER OR WATCHING TELEVISION: NOT AT ALL
8. MOVING OR SPEAKING SO SLOWLY THAT OTHER PEOPLE COULD HAVE NOTICED. OR THE OPPOSITE, BEING SO FIGETY OR RESTLESS THAT YOU HAVE BEEN MOVING AROUND A LOT MORE THAN USUAL: NOT AT ALL
SUM OF ALL RESPONSES TO PHQ QUESTIONS 1-9: 0
4. FEELING TIRED OR HAVING LITTLE ENERGY: NOT AT ALL
10. IF YOU CHECKED OFF ANY PROBLEMS, HOW DIFFICULT HAVE THESE PROBLEMS MADE IT FOR YOU TO DO YOUR WORK, TAKE CARE OF THINGS AT HOME, OR GET ALONG WITH OTHER PEOPLE: NOT DIFFICULT AT ALL
SUM OF ALL RESPONSES TO PHQ QUESTIONS 1-9: 0
1. LITTLE INTEREST OR PLEASURE IN DOING THINGS: NOT AT ALL
2. FEELING DOWN, DEPRESSED OR HOPELESS: NOT AT ALL
6. FEELING BAD ABOUT YOURSELF - OR THAT YOU ARE A FAILURE OR HAVE LET YOURSELF OR YOUR FAMILY DOWN: NOT AT ALL
SUM OF ALL RESPONSES TO PHQ9 QUESTIONS 1 & 2: 0
SUM OF ALL RESPONSES TO PHQ QUESTIONS 1-9: 0
9. THOUGHTS THAT YOU WOULD BE BETTER OFF DEAD, OR OF HURTING YOURSELF: NOT AT ALL
3. TROUBLE FALLING OR STAYING ASLEEP: NOT AT ALL

## 2024-10-28 NOTE — PROGRESS NOTES
NOT taking higher dose of Labetalol as prescribed at last visit . States readings are fine at home!         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   C/o  Dr Calle             7/7/2021 Patient presents with:  Medicare AWV  Hypertension  Stress: because of                 Last seen   1/7/2021 Patient presents with:  Hypertension  Generally well   No falls   Stressed with 's personality issues!   Managing with reg daily exercise              6/30/2020   Medicare AWV          12/19/2019        10/15/19   Rupture injury / ankle while on Treadmill . C/O Podiatrist         3/26/19     1/29/19      9/18/18       8/15/18         7/26/18 Hypertension: stressed b/e of  'not giving me space '  Headache                        Review of Systems   Constitutional:         Flu  Vac  8/24    tdap  10/16    Zostavac 2013 / [facility] .  Shingrex 10/19    Pneumonia vac   5/19 ; 10/15    Covid Vac   11/23  NEW     RSV 3/24   HENT: Negative.          Dental ex   Reg    Eyes:         Eye  Ex   2024    Wears Glasses    Respiratory:  Negative for chest tightness and wheezing.         Does not smoke . No Etoh .   No asthma   Cardiovascular:  Chest pain: off and on with anxiety.        HTN > 10 yrs  , No known CAD     Mom had MI in 70s   Gastrointestinal:  Negative for abdominal pain, blood in stool, constipation and diarrhea.        Last Colonoscopy 2/23     Last  upper Endoscopy 2/24    . Has  Schatzki's ring which was  dilated   Get survelence  CT Chest with dr Dewitt  ; due to f/u this [facility]         CT  Chest 5/23 CHEST:     No evidence of acute normality or metastatic disease identified in the chest.     ABDOMEN AND PELVIS:     No evidence of acute process or metastatic disease identified in the abdomen or pelvis.    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   C/o  Dr Calle

## 2024-11-12 ENCOUNTER — TELEPHONE (OUTPATIENT)
Dept: PRIMARY CARE CLINIC | Age: 74
End: 2024-11-12

## 2024-11-19 DIAGNOSIS — D50.8 OTHER IRON DEFICIENCY ANEMIA: ICD-10-CM

## 2024-11-19 NOTE — TELEPHONE ENCOUNTER
Medication:   Requested Prescriptions     Pending Prescriptions Disp Refills    omeprazole (PRILOSEC) 40 MG delayed release capsule [Pharmacy Med Name: OMEPRAZOLE DR 40 MG CAPSULE] 60 capsule 0     Sig: TAKE 1 CAPSULE BY MOUTH 2 TIMES A DAY        Last Filled:      Patient Phone Number: [phone] (home)     Last appt: 10/28/2024   Next appt: 12/13/2024    Last OARRS:       4/16/2019    10:04 AM   RX Monitoring   Attestation The Prescription Monitoring Report for this patient was reviewed today.

## 2024-11-20 RX ORDER — OMEPRAZOLE 40 MG/1
40 CAPSULE, DELAYED RELEASE ORAL 2 TIMES DAILY
Qty: 60 CAPSULE | Refills: 0 | Status: SHIPPED | OUTPATIENT
Start: 2024-11-20

## 2024-12-10 ENCOUNTER — TELEPHONE (OUTPATIENT)
Dept: PRIMARY CARE CLINIC | Age: 74
End: 2024-12-10

## 2024-12-10 DIAGNOSIS — E78.00 PURE HYPERCHOLESTEROLEMIA: ICD-10-CM

## 2024-12-10 DIAGNOSIS — D50.8 OTHER IRON DEFICIENCY ANEMIA: ICD-10-CM

## 2024-12-10 DIAGNOSIS — I10 ESSENTIAL HYPERTENSION: ICD-10-CM

## 2024-12-10 NOTE — TELEPHONE ENCOUNTER
Dorothea Dillon, a representative with [employer], called stating that PT is eligible for 100 day supplies of tier 1 and tier 2 medications which include Simvastatin, Amlodipine, Labetolol & Omeprazole all at the same cost as a 90 day supply refill.     Per Dorothea, PT is interested in receiving 100 day refill supplies of these medications.     Best call back number: [phone]

## 2024-12-11 RX ORDER — OMEPRAZOLE 40 MG/1
40 CAPSULE, DELAYED RELEASE ORAL 2 TIMES DAILY
Qty: 190 CAPSULE | Refills: 1 | Status: SHIPPED | OUTPATIENT
Start: 2024-12-11

## 2024-12-11 RX ORDER — LABETALOL 200 MG/1
200 TABLET, FILM COATED ORAL EVERY MORNING
Qty: 100 TABLET | Refills: 3 | Status: SHIPPED | OUTPATIENT
Start: 2024-12-11

## 2024-12-11 RX ORDER — AMLODIPINE BESYLATE 10 MG/1
10 TABLET ORAL DAILY
Qty: 100 TABLET | Refills: 3 | Status: SHIPPED | OUTPATIENT
Start: 2024-12-11

## 2024-12-11 RX ORDER — LABETALOL 100 MG/1
100 TABLET, FILM COATED ORAL EVERY EVENING
Qty: 100 TABLET | Refills: 3 | Status: SHIPPED | OUTPATIENT
Start: 2024-12-11

## 2024-12-11 RX ORDER — SIMVASTATIN 20 MG
20 TABLET ORAL NIGHTLY
Qty: 100 TABLET | Refills: 3 | Status: SHIPPED | OUTPATIENT
Start: 2024-12-11

## 2024-12-12 SDOH — ECONOMIC STABILITY: INCOME INSECURITY: HOW HARD IS IT FOR YOU TO PAY FOR THE VERY BASICS LIKE FOOD, HOUSING, MEDICAL CARE, AND HEATING?: PATIENT DECLINED

## 2024-12-12 SDOH — ECONOMIC STABILITY: FOOD INSECURITY: WITHIN THE PAST 12 MONTHS, THE FOOD YOU BOUGHT JUST DIDN'T LAST AND YOU DIDN'T HAVE MONEY TO GET MORE.: PATIENT DECLINED

## 2024-12-12 SDOH — ECONOMIC STABILITY: FOOD INSECURITY: WITHIN THE PAST 12 MONTHS, YOU WORRIED THAT YOUR FOOD WOULD RUN OUT BEFORE YOU GOT MONEY TO BUY MORE.: PATIENT DECLINED

## 2024-12-13 ENCOUNTER — OFFICE VISIT (OUTPATIENT)
Dept: PRIMARY CARE CLINIC | Age: 74
End: 2024-12-13

## 2024-12-13 VITALS
SYSTOLIC BLOOD PRESSURE: 138 MMHG | WEIGHT: 176.4 LBS | TEMPERATURE: 97.1 F | DIASTOLIC BLOOD PRESSURE: 70 MMHG | HEIGHT: 65 IN | HEART RATE: 70 BPM | OXYGEN SATURATION: 100 % | BODY MASS INDEX: 29.39 KG/M2

## 2024-12-13 DIAGNOSIS — R63.4 WEIGHT LOSS: ICD-10-CM

## 2024-12-13 DIAGNOSIS — R63.4 WEIGHT LOSS: Primary | ICD-10-CM

## 2024-12-13 DIAGNOSIS — I10 ESSENTIAL HYPERTENSION: ICD-10-CM

## 2024-12-13 DIAGNOSIS — F32.A ANXIETY AND DEPRESSION: ICD-10-CM

## 2024-12-13 DIAGNOSIS — F41.9 ANXIETY AND DEPRESSION: ICD-10-CM

## 2024-12-13 LAB
ERYTHROCYTE [SEDIMENTATION RATE] IN BLOOD BY WESTERGREN METHOD: 8 MM/HR (ref 0–30)
EST. AVERAGE GLUCOSE BLD GHB EST-MCNC: 134.1 MG/DL
HBA1C MFR BLD: 6.3 %
TSH SERPL DL<=0.005 MIU/L-ACNC: 1.1 UIU/ML (ref 0.27–4.2)

## 2024-12-13 SDOH — ECONOMIC STABILITY: FOOD INSECURITY: WITHIN THE PAST 12 MONTHS, YOU WORRIED THAT YOUR FOOD WOULD RUN OUT BEFORE YOU GOT MONEY TO BUY MORE.: NEVER TRUE

## 2024-12-13 SDOH — ECONOMIC STABILITY: FOOD INSECURITY: WITHIN THE PAST 12 MONTHS, THE FOOD YOU BOUGHT JUST DIDN'T LAST AND YOU DIDN'T HAVE MONEY TO GET MORE.: NEVER TRUE

## 2024-12-13 SDOH — ECONOMIC STABILITY: INCOME INSECURITY: HOW HARD IS IT FOR YOU TO PAY FOR THE VERY BASICS LIKE FOOD, HOUSING, MEDICAL CARE, AND HEATING?: NOT HARD AT ALL

## 2024-12-13 ASSESSMENT — ENCOUNTER SYMPTOMS
CHEST TIGHTNESS: 0
CONSTIPATION: 0
BLOOD IN STOOL: 0
WHEEZING: 0
ABDOMINAL PAIN: 0
DIARRHEA: 0

## 2024-12-13 NOTE — PROGRESS NOTES
Subjective:      Patient ID: Thao Ross is a 74 y.o. female.    12/13/2024   Patient presents with:  Hypertension  Abscess: Left buttock f/u  no issues   Discuss Labs: Vit b12 is very high would like to know why    Concerned about weight loss ! Does have chronic anxiety/ depression but says she's is eating same!                    10/28/2024  Patient presents with:  Abscess: Boil on left side of buttock              10/9/2024  Patient presents with:  Anxiety: 1 week f/u  resolved     Shoulder pain resolved     Scheduled to see  Dr Calle  12/24                   10/2/2024  Patient presents with:  Neck Pain: No injury- feels it may be stress from her just getting scammed  Shoulder Pain  Abdominal Pain  2 days                   6/18/2024  Patient presents with:  Medicare AWV  Hypertension                  5/13/2024  Patient presents with:  Eye Problem: Corneal irritation of right eye- went to [facility] 5/2/2024, saw Eye Dr that same week .  Nl exam with Ophthalmology . 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 Very stressed with  . Get into arguments . Sleep disturbed             12/15/2023 Patient presents with:  Hypertension                  9/11/23  VV           6/1/2023  Patient presents with:  Hypertension    Saw Dr Villar 5/10/23 for muscle cramps!              Last seen  7/22/2022 Patient presents with:  Medicare AWV  Hypertension            4/22/2022 Patient presents with:  Other: follow up (BP)    Exercising reg . Not needing xanax             3/28/2022 Patient presents with:  Hypertension  Headache  Depression: with anxiety . Concerned about wt gain                 1/6/2022 Patient presents with:  Hypertension  Cannot take pm dose of labetalol 200 ! Taking it in am + Norvasc   Abdominal Pain: no pain as such ; more back pain   .1 month f/u Had repeat biopsy by Dr Subramanian 12/21 ; Nl pathology . CT abd / chest  8/21 done

## 2025-01-07 ASSESSMENT — PATIENT HEALTH QUESTIONNAIRE - PHQ9
3. TROUBLE FALLING OR STAYING ASLEEP: NOT AT ALL
5. POOR APPETITE OR OVEREATING: NOT AT ALL
10. IF YOU CHECKED OFF ANY PROBLEMS, HOW DIFFICULT HAVE THESE PROBLEMS MADE IT FOR YOU TO DO YOUR WORK, TAKE CARE OF THINGS AT HOME, OR GET ALONG WITH OTHER PEOPLE: NOT DIFFICULT AT ALL
4. FEELING TIRED OR HAVING LITTLE ENERGY: NOT AT ALL
6. FEELING BAD ABOUT YOURSELF - OR THAT YOU ARE A FAILURE OR HAVE LET YOURSELF OR YOUR FAMILY DOWN: NOT AT ALL
SUM OF ALL RESPONSES TO PHQ QUESTIONS 1-9: 0
2. FEELING DOWN, DEPRESSED OR HOPELESS: NOT AT ALL
8. MOVING OR SPEAKING SO SLOWLY THAT OTHER PEOPLE COULD HAVE NOTICED. OR THE OPPOSITE, BEING SO FIGETY OR RESTLESS THAT YOU HAVE BEEN MOVING AROUND A LOT MORE THAN USUAL: NOT AT ALL
9. THOUGHTS THAT YOU WOULD BE BETTER OFF DEAD, OR OF HURTING YOURSELF: NOT AT ALL
SUM OF ALL RESPONSES TO PHQ QUESTIONS 1-9: 0
5. POOR APPETITE OR OVEREATING: NOT AT ALL
SUM OF ALL RESPONSES TO PHQ QUESTIONS 1-9: 0
10. IF YOU CHECKED OFF ANY PROBLEMS, HOW DIFFICULT HAVE THESE PROBLEMS MADE IT FOR YOU TO DO YOUR WORK, TAKE CARE OF THINGS AT HOME, OR GET ALONG WITH OTHER PEOPLE: NOT DIFFICULT AT ALL
9. THOUGHTS THAT YOU WOULD BE BETTER OFF DEAD, OR OF HURTING YOURSELF: NOT AT ALL
2. FEELING DOWN, DEPRESSED OR HOPELESS: NOT AT ALL
7. TROUBLE CONCENTRATING ON THINGS, SUCH AS READING THE NEWSPAPER OR WATCHING TELEVISION: NOT AT ALL
3. TROUBLE FALLING OR STAYING ASLEEP: NOT AT ALL
SUM OF ALL RESPONSES TO PHQ QUESTIONS 1-9: 0
SUM OF ALL RESPONSES TO PHQ9 QUESTIONS 1 & 2: 0
8. MOVING OR SPEAKING SO SLOWLY THAT OTHER PEOPLE COULD HAVE NOTICED. OR THE OPPOSITE - BEING SO FIDGETY OR RESTLESS THAT YOU HAVE BEEN MOVING AROUND A LOT MORE THAN USUAL: NOT AT ALL
7. TROUBLE CONCENTRATING ON THINGS, SUCH AS READING THE NEWSPAPER OR WATCHING TELEVISION: NOT AT ALL
4. FEELING TIRED OR HAVING LITTLE ENERGY: NOT AT ALL
1. LITTLE INTEREST OR PLEASURE IN DOING THINGS: NOT AT ALL
6. FEELING BAD ABOUT YOURSELF - OR THAT YOU ARE A FAILURE OR HAVE LET YOURSELF OR YOUR FAMILY DOWN: NOT AT ALL
SUM OF ALL RESPONSES TO PHQ QUESTIONS 1-9: 0
1. LITTLE INTEREST OR PLEASURE IN DOING THINGS: NOT AT ALL

## 2025-01-10 ENCOUNTER — OFFICE VISIT (OUTPATIENT)
Dept: PRIMARY CARE CLINIC | Age: 75
End: 2025-01-10

## 2025-01-10 VITALS
HEART RATE: 71 BPM | DIASTOLIC BLOOD PRESSURE: 65 MMHG | SYSTOLIC BLOOD PRESSURE: 120 MMHG | BODY MASS INDEX: 29.99 KG/M2 | HEIGHT: 65 IN | TEMPERATURE: 97.2 F | OXYGEN SATURATION: 100 % | WEIGHT: 180 LBS

## 2025-01-10 DIAGNOSIS — E78.00 PURE HYPERCHOLESTEROLEMIA: ICD-10-CM

## 2025-01-10 DIAGNOSIS — Z00.00 MEDICARE ANNUAL WELLNESS VISIT, SUBSEQUENT: Primary | ICD-10-CM

## 2025-01-10 DIAGNOSIS — I10 ESSENTIAL HYPERTENSION: ICD-10-CM

## 2025-01-10 DIAGNOSIS — F41.9 ANXIETY: ICD-10-CM

## 2025-01-10 DIAGNOSIS — R73.03 PRE-DIABETES: ICD-10-CM

## 2025-01-10 PROBLEM — C7A.8 NEURO-ENDOCRINE CANCER (HCC): Status: RESOLVED | Noted: 2022-01-06 | Resolved: 2025-01-10

## 2025-01-10 RX ORDER — SIMVASTATIN 20 MG
20 TABLET ORAL NIGHTLY
Qty: 100 TABLET | Refills: 3 | Status: SHIPPED | OUTPATIENT
Start: 2025-01-10

## 2025-01-10 RX ORDER — LABETALOL 200 MG/1
200 TABLET, FILM COATED ORAL EVERY MORNING
Qty: 100 TABLET | Refills: 3 | Status: SHIPPED | OUTPATIENT
Start: 2025-01-10

## 2025-01-10 RX ORDER — AMLODIPINE BESYLATE 10 MG/1
10 TABLET ORAL DAILY
Qty: 100 TABLET | Refills: 3 | Status: SHIPPED | OUTPATIENT
Start: 2025-01-10

## 2025-01-10 RX ORDER — LABETALOL 100 MG/1
100 TABLET, FILM COATED ORAL EVERY EVENING
Qty: 100 TABLET | Refills: 3 | Status: SHIPPED | OUTPATIENT
Start: 2025-01-10

## 2025-01-10 SDOH — ECONOMIC STABILITY: FOOD INSECURITY: WITHIN THE PAST 12 MONTHS, YOU WORRIED THAT YOUR FOOD WOULD RUN OUT BEFORE YOU GOT MONEY TO BUY MORE.: NEVER TRUE

## 2025-01-10 SDOH — ECONOMIC STABILITY: FOOD INSECURITY: WITHIN THE PAST 12 MONTHS, THE FOOD YOU BOUGHT JUST DIDN'T LAST AND YOU DIDN'T HAVE MONEY TO GET MORE.: NEVER TRUE

## 2025-01-10 ASSESSMENT — PATIENT HEALTH QUESTIONNAIRE - PHQ9
SUM OF ALL RESPONSES TO PHQ QUESTIONS 1-9: 0
6. FEELING BAD ABOUT YOURSELF - OR THAT YOU ARE A FAILURE OR HAVE LET YOURSELF OR YOUR FAMILY DOWN: NOT AT ALL
SUM OF ALL RESPONSES TO PHQ QUESTIONS 1-9: 0
8. MOVING OR SPEAKING SO SLOWLY THAT OTHER PEOPLE COULD HAVE NOTICED. OR THE OPPOSITE, BEING SO FIGETY OR RESTLESS THAT YOU HAVE BEEN MOVING AROUND A LOT MORE THAN USUAL: NOT AT ALL
SUM OF ALL RESPONSES TO PHQ QUESTIONS 1-9: 0
4. FEELING TIRED OR HAVING LITTLE ENERGY: NOT AT ALL
3. TROUBLE FALLING OR STAYING ASLEEP: NOT AT ALL
1. LITTLE INTEREST OR PLEASURE IN DOING THINGS: NOT AT ALL
9. THOUGHTS THAT YOU WOULD BE BETTER OFF DEAD, OR OF HURTING YOURSELF: NOT AT ALL
5. POOR APPETITE OR OVEREATING: NOT AT ALL
2. FEELING DOWN, DEPRESSED OR HOPELESS: NOT AT ALL
7. TROUBLE CONCENTRATING ON THINGS, SUCH AS READING THE NEWSPAPER OR WATCHING TELEVISION: NOT AT ALL
SUM OF ALL RESPONSES TO PHQ9 QUESTIONS 1 & 2: 0
SUM OF ALL RESPONSES TO PHQ QUESTIONS 1-9: 0

## 2025-01-10 ASSESSMENT — ENCOUNTER SYMPTOMS
WHEEZING: 0
ABDOMINAL PAIN: 0
CHEST TIGHTNESS: 0
CONSTIPATION: 0
DIARRHEA: 0
BLOOD IN STOOL: 0

## 2025-01-10 NOTE — PROGRESS NOTES
Subjective:      Patient ID: Thao Ross is a 74 y.o. female.    1/10/2025  Patient presents with:  Medicare AWV  Hypertension   Weight check . Review labs    Anxiety  Would like to speak with a Therapist   Other                12/13/2024   Patient presents with:  Hypertension  Abscess: Left buttock f/u  no issues   Discuss Labs: Vit b12 is very high would like to know why    Concerned about weight loss ! Does have chronic anxiety/ depression but says she's is eating same!                    10/28/2024  Patient presents with:  Abscess: Boil on left side of buttock              10/9/2024  Patient presents with:  Anxiety: 1 week f/u  resolved     Shoulder pain resolved     Scheduled to see  Dr Calle  12/24                   10/2/2024  Patient presents with:  Neck Pain: No injury- feels it may be stress from her just getting scammed  Shoulder Pain  Abdominal Pain  2 days                   6/18/2024  Patient presents with:  Medicare AWV  Hypertension                  5/13/2024  Patient presents with:  Eye Problem: Corneal irritation of right eye- went to [facility] 5/2/2024, saw Eye Dr that same week .  Nl exam with Ophthalmology . 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 Very stressed with  . Get into arguments . Sleep disturbed             12/15/2023 Patient presents with:  Hypertension                  9/11/23  VV           6/1/2023  Patient presents with:  Hypertension    Saw Dr Villar 5/10/23 for muscle cramps!              Last seen  7/22/2022 Patient presents with:  Medicare AWV  Hypertension            4/22/2022 Patient presents with:  Other: follow up (BP)    Exercising reg . Not needing xanax             3/28/2022 Patient presents with:  Hypertension  Headache  Depression: with anxiety . Concerned about wt gain                 1/6/2022 Patient presents with:  Hypertension  Cannot take pm dose of labetalol 200 ! Taking it

## 2025-01-13 ENCOUNTER — OFFICE VISIT (OUTPATIENT)
Dept: PSYCHOLOGY | Age: 75
End: 2025-01-13
Payer: MEDICARE

## 2025-01-13 DIAGNOSIS — Z63.0 RELATIONSHIP PARTNER UNWELL: ICD-10-CM

## 2025-01-13 DIAGNOSIS — F43.22 ADJUSTMENT DISORDER WITH ANXIOUS MOOD: Primary | ICD-10-CM

## 2025-01-13 PROCEDURE — 90832 PSYTX W PT 30 MINUTES: CPT | Performed by: STUDENT IN AN ORGANIZED HEALTH CARE EDUCATION/TRAINING PROGRAM

## 2025-01-13 PROCEDURE — 1123F ACP DISCUSS/DSCN MKR DOCD: CPT | Performed by: STUDENT IN AN ORGANIZED HEALTH CARE EDUCATION/TRAINING PROGRAM

## 2025-01-13 SDOH — SOCIAL STABILITY - SOCIAL INSECURITY: PROBLEMS IN RELATIONSHIP WITH SPOUSE OR PARTNER: Z63.0

## 2025-01-13 NOTE — PROGRESS NOTES
Intact  Orientation    oriented to person, place, time, and general circumstances  Memory    recent and remote memory intact  Attention/Concentration    intact  Morbid ideation No  Suicide Assessment    no suicidal ideation      History:    Medications:   Current Outpatient Medications   Medication Sig Dispense Refill    amLODIPine (NORVASC) 10 MG tablet Take 1 tablet by mouth daily 100 tablet 3    labetalol (NORMODYNE) 100 MG tablet Take 1 tablet by mouth every evening 100 tablet 3    labetalol (NORMODYNE) 200 MG tablet Take 1 tablet by mouth every morning 100 tablet 3    simvastatin (ZOCOR) 20 MG tablet Take 1 tablet by mouth nightly 100 tablet 3    omeprazole (PRILOSEC) 40 MG delayed release capsule Take 1 capsule by mouth 2 times daily 190 capsule 1    Polysaccharide Iron Complex (PROFE) 391.3 (180 Fe) MG CAPS Take 1 capsule by mouth daily (with breakfast) 30 capsule 3    gabapentin (NEURONTIN) 100 MG capsule Take 1 capsule by mouth 3 times daily for 90 days. 90 capsule 3    Boswellia-Glucosamine-Vit D (OSTEO BI-FLEX ONE PER DAY) TABS Take by mouth      RESTASIS 0.05 % ophthalmic emulsion Place 1 drop into both eyes in the morning and 1 drop in the evening.       No current facility-administered medications for this visit.   Mental health History:  Pt reported that she had attended therapy once in the past.     Social History:   Social History     Socioeconomic History    Marital status:      Spouse name: Not on file    Number of children: 2    Years of education: Not on file    Highest education level: Not on file   Occupational History    Not on file   Tobacco Use    Smoking status: Never    Smokeless tobacco: Never    Tobacco comments:     quit when 29   Vaping Use    Vaping status: Never Used   Substance and Sexual Activity    Alcohol use: No    Drug use: No    Sexual activity: Not Currently, expressed displeasure with frequency  and lack of non-demand touching.    Other Topics Concern    Not on file

## 2025-02-08 ENCOUNTER — APPOINTMENT (OUTPATIENT)
Dept: CT IMAGING | Age: 75
End: 2025-02-08
Payer: MEDICARE

## 2025-02-08 ENCOUNTER — HOSPITAL ENCOUNTER (EMERGENCY)
Age: 75
Discharge: HOME | End: 2025-02-08
Payer: MEDICARE

## 2025-02-08 VITALS
RESPIRATION RATE: 18 BRPM | TEMPERATURE: 98.4 F | BODY MASS INDEX: 30.41 KG/M2 | DIASTOLIC BLOOD PRESSURE: 73 MMHG | WEIGHT: 182.54 LBS | HEIGHT: 65 IN | HEART RATE: 76 BPM | SYSTOLIC BLOOD PRESSURE: 141 MMHG | OXYGEN SATURATION: 99 %

## 2025-02-08 DIAGNOSIS — K59.00 CONSTIPATION, UNSPECIFIED CONSTIPATION TYPE: ICD-10-CM

## 2025-02-08 DIAGNOSIS — M54.50 ACUTE LEFT-SIDED LOW BACK PAIN WITHOUT SCIATICA: Primary | ICD-10-CM

## 2025-02-08 LAB
ALBUMIN SERPL-MCNC: 4.4 G/DL (ref 3.4–5)
ALBUMIN/GLOB SERPL: 1.4 {RATIO} (ref 1.1–2.2)
ALP SERPL-CCNC: 93 U/L (ref 40–129)
ALT SERPL-CCNC: 17 U/L (ref 10–40)
ANION GAP SERPL CALCULATED.3IONS-SCNC: 10 MMOL/L (ref 3–16)
AST SERPL-CCNC: 24 U/L (ref 15–37)
BASOPHILS # BLD: 0 K/UL (ref 0–0.2)
BASOPHILS NFR BLD: 0.7 %
BILIRUB SERPL-MCNC: 0.4 MG/DL (ref 0–1)
BILIRUB UR QL STRIP.AUTO: NEGATIVE
BUN SERPL-MCNC: 11 MG/DL (ref 7–20)
CALCIUM SERPL-MCNC: 10.2 MG/DL (ref 8.3–10.6)
CHLORIDE SERPL-SCNC: 107 MMOL/L (ref 99–110)
CLARITY UR: CLEAR
CO2 SERPL-SCNC: 26 MMOL/L (ref 21–32)
COLOR UR: YELLOW
CREAT SERPL-MCNC: 0.9 MG/DL (ref 0.6–1.2)
DEPRECATED RDW RBC AUTO: 14.7 % (ref 12.4–15.4)
EOSINOPHIL # BLD: 0.2 K/UL (ref 0–0.6)
EOSINOPHIL NFR BLD: 2.7 %
GFR SERPLBLD CREATININE-BSD FMLA CKD-EPI: 67 ML/MIN/{1.73_M2}
GLUCOSE SERPL-MCNC: 93 MG/DL (ref 70–99)
GLUCOSE UR STRIP.AUTO-MCNC: NEGATIVE MG/DL
HCT VFR BLD AUTO: 36.6 % (ref 36–48)
HGB BLD-MCNC: 12.2 G/DL (ref 12–16)
HGB UR QL STRIP.AUTO: NEGATIVE
KETONES UR STRIP.AUTO-MCNC: NEGATIVE MG/DL
LEUKOCYTE ESTERASE UR QL STRIP.AUTO: NEGATIVE
LIPASE SERPL-CCNC: 26 U/L (ref 13–60)
LYMPHOCYTES # BLD: 2.8 K/UL (ref 1–5.1)
LYMPHOCYTES NFR BLD: 41.3 %
MCH RBC QN AUTO: 30.6 PG (ref 26–34)
MCHC RBC AUTO-ENTMCNC: 33.2 G/DL (ref 31–36)
MCV RBC AUTO: 92 FL (ref 80–100)
MONOCYTES # BLD: 0.7 K/UL (ref 0–1.3)
MONOCYTES NFR BLD: 10.1 %
NEUTROPHILS # BLD: 3 K/UL (ref 1.7–7.7)
NEUTROPHILS NFR BLD: 45.2 %
NITRITE UR QL STRIP.AUTO: NEGATIVE
PH UR STRIP.AUTO: 5.5 [PH] (ref 5–8)
PLATELET # BLD AUTO: 337 K/UL (ref 135–450)
PMV BLD AUTO: 7.4 FL (ref 5–10.5)
POTASSIUM SERPL-SCNC: 4.2 MMOL/L (ref 3.5–5.1)
PROT SERPL-MCNC: 7.6 G/DL (ref 6.4–8.2)
PROT UR STRIP.AUTO-MCNC: NEGATIVE MG/DL
RBC # BLD AUTO: 3.98 M/UL (ref 4–5.2)
SODIUM SERPL-SCNC: 143 MMOL/L (ref 136–145)
SP GR UR STRIP.AUTO: 1.01 (ref 1–1.03)
UA COMPLETE W REFLEX CULTURE PNL UR: NORMAL
UA DIPSTICK W REFLEX MICRO PNL UR: NORMAL
URN SPEC COLLECT METH UR: NORMAL
UROBILINOGEN UR STRIP-ACNC: 0.2 E.U./DL
WBC # BLD AUTO: 6.7 K/UL (ref 4–11)

## 2025-02-08 PROCEDURE — 83690 ASSAY OF LIPASE: CPT

## 2025-02-08 PROCEDURE — 81003 URINALYSIS AUTO W/O SCOPE: CPT

## 2025-02-08 PROCEDURE — 74177 CT ABD & PELVIS W/CONTRAST: CPT

## 2025-02-08 PROCEDURE — 36415 COLL VENOUS BLD VENIPUNCTURE: CPT

## 2025-02-08 PROCEDURE — 6360000004 HC RX CONTRAST MEDICATION: Performed by: PHYSICIAN ASSISTANT

## 2025-02-08 PROCEDURE — 85025 COMPLETE CBC W/AUTO DIFF WBC: CPT

## 2025-02-08 PROCEDURE — 99285 EMERGENCY DEPT VISIT HI MDM: CPT

## 2025-02-08 PROCEDURE — 80053 COMPREHEN METABOLIC PANEL: CPT

## 2025-02-08 RX ORDER — IOPAMIDOL 755 MG/ML
75 INJECTION, SOLUTION INTRAVASCULAR
Status: COMPLETED | OUTPATIENT
Start: 2025-02-08 | End: 2025-02-08

## 2025-02-08 RX ORDER — LIDOCAINE 50 MG/G
1 PATCH TOPICAL DAILY
Qty: 30 PATCH | Refills: 0 | Status: SHIPPED | OUTPATIENT
Start: 2025-02-08

## 2025-02-08 RX ORDER — DOCUSATE SODIUM 100 MG/1
100 CAPSULE, LIQUID FILLED ORAL 2 TIMES DAILY
Qty: 30 CAPSULE | Refills: 0 | Status: SHIPPED | OUTPATIENT
Start: 2025-02-08

## 2025-02-08 RX ADMIN — IOPAMIDOL 75 ML: 755 INJECTION, SOLUTION INTRAVENOUS at 15:29

## 2025-02-08 ASSESSMENT — ENCOUNTER SYMPTOMS
BACK PAIN: 1
ABDOMINAL PAIN: 1
CHEST TIGHTNESS: 0
COUGH: 0
ABDOMINAL DISTENTION: 0
RESPIRATORY NEGATIVE: 1
VOMITING: 0
DIARRHEA: 0
NAUSEA: 0
SHORTNESS OF BREATH: 0
COLOR CHANGE: 0
CONSTIPATION: 0

## 2025-02-08 ASSESSMENT — PAIN SCALES - GENERAL: PAINLEVEL_OUTOF10: 8

## 2025-02-08 ASSESSMENT — PAIN DESCRIPTION - LOCATION: LOCATION: FLANK

## 2025-02-08 ASSESSMENT — PAIN DESCRIPTION - ORIENTATION: ORIENTATION: LEFT

## 2025-02-08 ASSESSMENT — PAIN - FUNCTIONAL ASSESSMENT: PAIN_FUNCTIONAL_ASSESSMENT: 0-10

## 2025-02-08 NOTE — ED TRIAGE NOTES
Patient presents with left flank pain that started about 2 days ago. Patient denies dysuria or hematuria. Patient does say she is drinking more water therefore having more frequency of urine.

## 2025-02-08 NOTE — ED PROVIDER NOTES
WITH AUTO DIFFERENTIAL - Abnormal; Notable for the following components:       Result Value    RBC 3.98 (*)     All other components within normal limits   URINALYSIS WITH REFLEX TO CULTURE   COMPREHENSIVE METABOLIC PANEL W/ REFLEX TO MG FOR LOW K   LIPASE       When ordered only abnormal lab results are displayed. All other labs were within normal range or not returned as of this dictation.    EKG: When ordered, EKG's are interpreted by the Emergency Department Physician in the absence of a cardiologist.  Please see their note for interpretation of EKG.    RADIOLOGY:   Non-plain film images such as CT, Ultrasound and MRI are read by the radiologist.     Interpretation per the Radiologist below, if available at the time of this note:    CT ABDOMEN PELVIS W IV CONTRAST Additional Contrast? None   Preliminary Result   1. No acute findings in the abdomen or pelvis.   2. Mild to moderate amount stool in the colon.           No results found.      ED Provider US Interpretation.    No results found.    PROCEDURES   Unless otherwise noted below, none     Procedures      CRITICAL CARE TIME (.cctime)       PAST MEDICAL HISTORY      has a past medical history of Acid reflux, Headache(784.0), Hyperlipidemia, Hypertension, and Osteoarthritis.     EMERGENCY DEPARTMENT COURSE and DIFFERENTIAL DIAGNOSIS/MDM:   Vitals:    Vitals:    02/08/25 1401   BP: (!) 141/73   Pulse: 81   Resp: 18   Temp: 98.6 °F (37 °C)   TempSrc: Oral   SpO2: 100%   Weight: 82.8 kg (182 lb 8.7 oz)   Height: 1.651 m (5' 5\")       Is this patient to be included in the SEP-1 Core Measure due to severe sepsis or septic shock?   No   Exclusion criteria - the patient is NOT to be included for SEP-1 Core Measure due to:  Infection is not suspected    Patient was given the following medications:  Medications   iopamidol (ISOVUE-370) 76 % injection 75 mL (75 mLs IntraVENous Given 2/8/25 9729)             Chronic Conditions affecting care:   has a past medical history    As needed, If symptoms worsen      DISCHARGE MEDICATIONS:  New Prescriptions    DOCUSATE SODIUM (COLACE) 100 MG CAPSULE    Take 1 capsule by mouth 2 times daily    LIDOCAINE (LIDODERM) 5 %    Place 1 patch onto the skin daily 12 hours on, 12 hours off.       DISCONTINUED MEDICATIONS:  Discontinued Medications    No medications on file              (Please note that portions of this note were completed with a voice recognition program.  Efforts were made to edit the dictations but occasionally words are mis-transcribed.)    CHARLY Anne (electronically signed)       Joe Zayas PA  02/08/25 8875

## 2025-02-08 NOTE — ED NOTES

## 2025-02-17 ENCOUNTER — OFFICE VISIT (OUTPATIENT)
Dept: PSYCHOLOGY | Age: 75
End: 2025-02-17
Payer: MEDICARE

## 2025-02-17 DIAGNOSIS — Z63.0 MARITAL CONFLICT: ICD-10-CM

## 2025-02-17 DIAGNOSIS — Z63.0 RELATIONSHIP PARTNER UNWELL: ICD-10-CM

## 2025-02-17 DIAGNOSIS — F43.22 ADJUSTMENT DISORDER WITH ANXIOUS MOOD: Primary | ICD-10-CM

## 2025-02-17 PROCEDURE — 1123F ACP DISCUSS/DSCN MKR DOCD: CPT | Performed by: STUDENT IN AN ORGANIZED HEALTH CARE EDUCATION/TRAINING PROGRAM

## 2025-02-17 PROCEDURE — 90834 PSYTX W PT 45 MINUTES: CPT | Performed by: STUDENT IN AN ORGANIZED HEALTH CARE EDUCATION/TRAINING PROGRAM

## 2025-02-17 SDOH — SOCIAL STABILITY - SOCIAL INSECURITY: PROBLEMS IN RELATIONSHIP WITH SPOUSE OR PARTNER: Z63.0

## 2025-02-17 NOTE — PROGRESS NOTES
Behavioral Health Consultation  Ramirez Singer PsyD  Licensed Psychologist  2/17/2025        Time spent with client or family: 48 minutes  This is patient's second [facility] appointment.    Reason for Consult: Relationship/Marriage conflict, life transitions    Referring Provider: No referring provider defined for this encounter.    Feedback given to referring provider.    S:  Pt shared that their trip to [place] was not fun, and that she believed her  was \"putting on a show\" for their son. Pt shared that she has been experiencing significant back pain that has caused stress. Pt shared that she feels like this is her year to make changes in her life. Pt shared she feels angry with herself. Pt shared that she feels like she \"woke up\" in 2015 about her unhappiness, and that now she wants to take action. Pt shared that she is unhappy within her relationship due to experiencing belittlement, jealousy, and not feeling heard in her marriage. Pt shared that her son thinks she is being too hard on her . Pt shared she is unsure how she can continue the way they are going and that her 's alzheimer's diagnosis is further complicating their relationship.       O:  MSE:  Appearance    alert, cooperative, crying, flushed, mild distress  Appetite normal  Sleep disturbance No  Fatigue Yes  Loss of pleasure Yes  Impulsive behavior No  Speech    normal rate, normal volume, and well articulated  Mood    Anxious  Angry  Guilty  Shame  Irritability   Affect    agitation  Thought Content    helplessness, excessive guilt, and excessive preoccupations  Thought Process    linear, goal directed, coherent, and slow  Associations    logical connections  Insight    Fair  Judgment    Intact  Orientation    oriented to person, place, time, and general circumstances  Memory    recent and remote memory intact  Attention/Concentration    intact  Morbid ideation No  Suicide Assessment    no suicidal ideation    A:  Thao presents

## 2025-02-26 ENCOUNTER — TELEPHONE (OUTPATIENT)
Dept: PRIMARY CARE CLINIC | Age: 75
End: 2025-02-26

## 2025-02-26 DIAGNOSIS — R73.03 PRE-DIABETES: Primary | ICD-10-CM

## 2025-02-26 NOTE — TELEPHONE ENCOUNTER
Pt walked in she's asking for a glucose machine to stick her figure pt is pre dm   Last visit 1/10/25  Please call pt to advise   [facility] [phone] - [facility] [address] - P [phone] - F [phone] [[id number]]

## 2025-02-27 ENCOUNTER — TELEPHONE (OUTPATIENT)
Dept: PRIMARY CARE CLINIC | Age: 75
End: 2025-02-27

## 2025-02-27 RX ORDER — BLOOD-GLUCOSE METER
1 EACH MISCELLANEOUS DAILY
Qty: 1 KIT | Refills: 0 | Status: SHIPPED | OUTPATIENT
Start: 2025-02-27

## 2025-02-27 RX ORDER — BLOOD SUGAR DIAGNOSTIC
1 STRIP MISCELLANEOUS DAILY
Qty: 100 EACH | Refills: 3 | Status: SHIPPED | OUTPATIENT
Start: 2025-02-27

## 2025-02-27 RX ORDER — LANCETS
100 EACH MISCELLANEOUS DAILY
Qty: 100 EACH | Refills: 3 | Status: SHIPPED | OUTPATIENT
Start: 2025-02-27

## 2025-02-27 NOTE — TELEPHONE ENCOUNTER
Pt was advised accu chek was sent to [facility] on file, pt states she will go  and call if have any further issues

## 2025-02-28 ENCOUNTER — TELEPHONE (OUTPATIENT)
Dept: PRIMARY CARE CLINIC | Age: 75
End: 2025-02-28

## 2025-03-03 ENCOUNTER — TELEPHONE (OUTPATIENT)
Dept: PRIMARY CARE CLINIC | Age: 75
End: 2025-03-03

## 2025-03-03 NOTE — TELEPHONE ENCOUNTER
Jairo calling from [facility] pharm states that blood glucose monitoring suppl did go through on patient insurance pls return call back @ [phone]

## 2025-03-04 ENCOUNTER — NURSE ONLY (OUTPATIENT)
Dept: PRIMARY CARE CLINIC | Age: 75
End: 2025-03-04

## 2025-03-04 DIAGNOSIS — R73.03 PRE-DIABETES: Primary | ICD-10-CM

## 2025-03-10 ENCOUNTER — OFFICE VISIT (OUTPATIENT)
Dept: PSYCHOLOGY | Age: 75
End: 2025-03-10
Payer: MEDICARE

## 2025-03-10 DIAGNOSIS — F43.22 ADJUSTMENT DISORDER WITH ANXIOUS MOOD: Primary | ICD-10-CM

## 2025-03-10 DIAGNOSIS — Z63.0 MARITAL CONFLICT: ICD-10-CM

## 2025-03-10 PROCEDURE — 90832 PSYTX W PT 30 MINUTES: CPT | Performed by: STUDENT IN AN ORGANIZED HEALTH CARE EDUCATION/TRAINING PROGRAM

## 2025-03-10 PROCEDURE — 1123F ACP DISCUSS/DSCN MKR DOCD: CPT | Performed by: STUDENT IN AN ORGANIZED HEALTH CARE EDUCATION/TRAINING PROGRAM

## 2025-03-10 SDOH — SOCIAL STABILITY - SOCIAL INSECURITY: PROBLEMS IN RELATIONSHIP WITH SPOUSE OR PARTNER: Z63.0

## 2025-03-10 NOTE — PROGRESS NOTES
Behavioral Health Consultation  Ramirez Singer PsyD  Licensed Psychologist  3/10/2025        Time spent with client or family: 30 minutes  This is patient's third [facility] appointment.    Reason for Consult:  Anxiety and marital conflict    Referring Provider: No referring provider defined for this encounter.    Feedback given to referring provider.    S:  Pt expressed having continued conflict with her , and struggling with how his \"dementia\" diagnosis has impacted him. Pt shared that she feels regret about certain parts of her life. Pt shared that her  talks about the past that causes pt to feel frustrated as she does not want to talk about old conflicts. Pt shared she feels like they have become \"stagnant\", and that she questions her ability to be the primary care giver. Pt discussed that she is anxious about her cancer diagnosis, and that she is waiting to hear back about if it has spread.       O:  MSE:  Appearance    alert, cooperative, crying, mild distress  Appetite normal  Sleep disturbance No  Fatigue No  Loss of pleasure Yes  Impulsive behavior No  Speech    normal rate, normal volume, and well articulated  Mood    Anxious  Angry   Affect    anxiety  Thought Content    excessive guilt and excessive preoccupations  Thought Process    linear, goal directed, and coherent  Associations    logical connections  Insight    Good  Judgment    Intact  Orientation    oriented to person, place, time, and general circumstances  Memory    recent and remote memory intact  Attention/Concentration    intact  Morbid ideation No  Suicide Assessment    no suicidal ideation    A:  Thao presents for a follow up [facility] appointment regarding concerns related to stress. Pt is experiencing increased stress as she supports her  following his dementia diagnosis. Pt is struggling with the changes her  is going through, and seems to feel anger/frustration toward some of his behaviors. Pt feels that she cannot

## 2025-03-31 ENCOUNTER — OFFICE VISIT (OUTPATIENT)
Dept: PSYCHOLOGY | Age: 75
End: 2025-03-31
Payer: MEDICARE

## 2025-03-31 DIAGNOSIS — Z63.0 RELATIONSHIP PARTNER UNWELL: ICD-10-CM

## 2025-03-31 DIAGNOSIS — Z63.0 MARITAL CONFLICT: ICD-10-CM

## 2025-03-31 DIAGNOSIS — F43.22 ADJUSTMENT DISORDER WITH ANXIOUS MOOD: Primary | ICD-10-CM

## 2025-03-31 PROCEDURE — 1123F ACP DISCUSS/DSCN MKR DOCD: CPT | Performed by: STUDENT IN AN ORGANIZED HEALTH CARE EDUCATION/TRAINING PROGRAM

## 2025-03-31 PROCEDURE — 90834 PSYTX W PT 45 MINUTES: CPT | Performed by: STUDENT IN AN ORGANIZED HEALTH CARE EDUCATION/TRAINING PROGRAM

## 2025-03-31 SDOH — SOCIAL STABILITY - SOCIAL INSECURITY: PROBLEMS IN RELATIONSHIP WITH SPOUSE OR PARTNER: Z63.0

## 2025-03-31 NOTE — PROGRESS NOTES
Behavioral Health Consultation  Ramirez Singer PsyD  Licensed Psychologist  3/31/2025        Time spent with client or family: 39 minutes  This is patient's fourth [facility] appointment.    Reason for Consult:  relationship conflict, stress, and anxiety    Referring Provider: Azar Nelson MD  [address] [place] [address]    Feedback given to referring provider.    S:  Pt shared that her friend noticed that Pt does not cry or argue as much as she used to. Pt shared that she has been in her bedroom to herself more often, and that her  does not like it. Pt expressed that she does not feel that this behavior is isolation, but rather protecting herself from conflict. Pt shared that her goal is to not go back to isolating like she did during the winter, and that she feels that she is connecting more with friends and her daughter. Pt shared that she and her  have been talking more about things from the past and their current sexual relationship. Pt shared she is not interested in sex. Pt shared that sometimes she feels \"stupid\" for staying in her relationship for so long. Pt expressed a desire for her  to get more support from others in her life, and that she believes he relies more on her since his mother .     O:  MSE:  Appearance    alert, cooperative, healthy, mild distress  Appetite normal  Sleep disturbance No  Fatigue Yes  Loss of pleasure Yes  Impulsive behavior No  Speech    normal rate, normal volume, and well articulated  Mood    Angry  Shame   Affect    normal affect  Thought Content    excessive guilt and excessive preoccupations  Thought Process    linear, goal directed, and coherent  Associations    logical connections  Insight    Fair  Judgment    Intact  Orientation    oriented to person, place, time, and general circumstances  Memory    recent and remote memory intact  Attention/Concentration    intact  Morbid ideation No  Suicide Assessment    no suicidal

## 2025-04-14 ENCOUNTER — OFFICE VISIT (OUTPATIENT)
Dept: PSYCHOLOGY | Age: 75
End: 2025-04-14
Payer: MEDICARE

## 2025-04-14 DIAGNOSIS — F43.22 ADJUSTMENT DISORDER WITH ANXIOUS MOOD: Primary | ICD-10-CM

## 2025-04-14 DIAGNOSIS — Z63.0 RELATIONSHIP PARTNER UNWELL: ICD-10-CM

## 2025-04-14 DIAGNOSIS — Z63.0 MARITAL CONFLICT: ICD-10-CM

## 2025-04-14 PROCEDURE — 90837 PSYTX W PT 60 MINUTES: CPT | Performed by: STUDENT IN AN ORGANIZED HEALTH CARE EDUCATION/TRAINING PROGRAM

## 2025-04-14 PROCEDURE — 1123F ACP DISCUSS/DSCN MKR DOCD: CPT | Performed by: STUDENT IN AN ORGANIZED HEALTH CARE EDUCATION/TRAINING PROGRAM

## 2025-04-14 SDOH — SOCIAL STABILITY - SOCIAL INSECURITY: PROBLEMS IN RELATIONSHIP WITH SPOUSE OR PARTNER: Z63.0

## 2025-04-14 NOTE — PROGRESS NOTES
Behavioral Health Consultation  Ramirez Singer PsyD  Licensed Psychologist  4/14/2025        Time spent with client or family: 55 minutes  This is patient's fifth [facility] appointment.    Reason for Consult: Relationship conflict and stress    Referring Provider: No referring provider defined for this encounter.    Feedback given to referring provider.    S:  Pt shared that her  has been bringing up more conflicts/memories from their past that she does not want to engage in. Pt shared that she feels that her  tries to change her and to fit herself into how she wants her to be. Pt discussed believing that she believes her  has tried to make her change her personality. Pt shared that her daughter and other people in her life thinks that she is improving due to noticing that she has been crying less.     O:  MSE:  Appearance    alert, cooperative, crying, moderate distress  Appetite normal  Sleep disturbance No  Fatigue No  Loss of pleasure No  Impulsive behavior No  Speech    normal rate, normal volume, and well articulated  Mood    Anxious  Guilty  Shame   Affect    normal affect  Thought Content    excessive guilt  Thought Process    linear, goal directed, and coherent  Associations    logical connections  Insight    Good  Judgment    Intact  Orientation    oriented to person, place, time, and general circumstances  Memory    recent and remote memory intact  Attention/Concentration    intact  Morbid ideation No  Suicide Assessment    no suicidal ideation    A:  Thao presents for a follow up [facility] appointment regarding concerns related to anxiety and stress. Pt worries about her 's health and is also frustrated by how it impacts his behavior. Pt feels tired and that she has provided her  a good life, and that she feels that he has tried to diminish her and change her. Pt feels more confident about herself, and believes that she is getting better. Pt benefits from ongoing therapy and

## 2025-04-21 ENCOUNTER — RESULTS FOLLOW-UP (OUTPATIENT)
Dept: PRIMARY CARE CLINIC | Age: 75
End: 2025-04-21

## 2025-04-28 ENCOUNTER — OFFICE VISIT (OUTPATIENT)
Dept: PSYCHOLOGY | Age: 75
End: 2025-04-28
Payer: MEDICARE

## 2025-04-28 DIAGNOSIS — F43.22 ADJUSTMENT DISORDER WITH ANXIOUS MOOD: Primary | ICD-10-CM

## 2025-04-28 DIAGNOSIS — Z63.0 MARITAL CONFLICT: ICD-10-CM

## 2025-04-28 PROCEDURE — 1123F ACP DISCUSS/DSCN MKR DOCD: CPT | Performed by: STUDENT IN AN ORGANIZED HEALTH CARE EDUCATION/TRAINING PROGRAM

## 2025-04-28 PROCEDURE — 90832 PSYTX W PT 30 MINUTES: CPT | Performed by: STUDENT IN AN ORGANIZED HEALTH CARE EDUCATION/TRAINING PROGRAM

## 2025-04-28 SDOH — SOCIAL STABILITY - SOCIAL INSECURITY: PROBLEMS IN RELATIONSHIP WITH SPOUSE OR PARTNER: Z63.0

## 2025-04-28 NOTE — PROGRESS NOTES
Behavioral Health Consultation  Ramirez Singer PsyD  Licensed Psychologist  4/28/2025        Time spent with client or family: 34 minutes  This is patient's sixth [facility] appointment.    Reason for Consult:  Stress    Referring Provider: No referring provider defined for this encounter.    Feedback given to referring provider.    S:  Pt reflected on recent interactions at home with her . Pt expressed continued frustration with how her  acts toward her. Pt that she feels like she does not receive enough understanding from her . Pt shared her children are noticing that she is improving and crying less.        O:  MSE:  Appearance    alert, cooperative, mild distress  Appetite normal  Sleep disturbance No  Fatigue No  Loss of pleasure No  Impulsive behavior No  Speech    normal rate, normal volume, and well articulated  Mood    Anxious  Angry  Guilty  Irritability   Affect    congruent affect  Thought Content    excessive guilt and excessive preoccupations  Thought Process    linear, goal directed, and coherent  Associations    logical connections  Insight    Good  Judgment    Intact  Orientation    oriented to person, place, time, and general circumstances  Memory    recent and remote memory intact  Attention/Concentration    intact  Morbid ideation No  Suicide Assessment    no suicidal ideation    A:  Thao presents for a follow up [facility] appointment regarding concerns related to anxiety and stress. Pt continues to have frustration with her . Pt feels that her  is unappreciative, brings up old conflicts, and demands too much of Pt currently.  Pt shared she may visit [place] with her grandson in the summer. Pt is less tearful and seems to be experiencing more positive moods.These symptoms are improving.  Safety concerns reported include: Pt denied any S/I or H/I and is not considered a risk to harm himself or others..    Diagnosis:  Encounter Diagnoses   Name Primary?    Adjustment

## 2025-05-09 ENCOUNTER — TELEPHONE (OUTPATIENT)
Dept: PRIMARY CARE CLINIC | Age: 75
End: 2025-05-09

## 2025-05-09 ENCOUNTER — OFFICE VISIT (OUTPATIENT)
Dept: PRIMARY CARE CLINIC | Age: 75
End: 2025-05-09

## 2025-05-09 VITALS
HEART RATE: 77 BPM | OXYGEN SATURATION: 99 % | DIASTOLIC BLOOD PRESSURE: 80 MMHG | WEIGHT: 169.8 LBS | TEMPERATURE: 97.9 F | SYSTOLIC BLOOD PRESSURE: 130 MMHG | BODY MASS INDEX: 28.29 KG/M2 | HEIGHT: 65 IN

## 2025-05-09 DIAGNOSIS — E78.00 PURE HYPERCHOLESTEROLEMIA: ICD-10-CM

## 2025-05-09 DIAGNOSIS — I10 ESSENTIAL HYPERTENSION: ICD-10-CM

## 2025-05-09 DIAGNOSIS — D3A.098: ICD-10-CM

## 2025-05-09 RX ORDER — LABETALOL 100 MG/1
100 TABLET, FILM COATED ORAL EVERY EVENING
Qty: 100 TABLET | Refills: 3 | Status: SHIPPED | OUTPATIENT
Start: 2025-05-09

## 2025-05-09 RX ORDER — SIMVASTATIN 20 MG
20 TABLET ORAL NIGHTLY
Qty: 100 TABLET | Refills: 3 | Status: SHIPPED | OUTPATIENT
Start: 2025-05-09

## 2025-05-09 RX ORDER — AMLODIPINE BESYLATE 10 MG/1
10 TABLET ORAL DAILY
Qty: 100 TABLET | Refills: 3 | Status: SHIPPED | OUTPATIENT
Start: 2025-05-09

## 2025-05-09 RX ORDER — LABETALOL 200 MG/1
200 TABLET, FILM COATED ORAL EVERY MORNING
Qty: 100 TABLET | Refills: 3 | Status: SHIPPED | OUTPATIENT
Start: 2025-05-09

## 2025-05-09 ASSESSMENT — ENCOUNTER SYMPTOMS
BLOOD IN STOOL: 0
ABDOMINAL PAIN: 0
CHEST TIGHTNESS: 0
CONSTIPATION: 0
DIARRHEA: 0
WHEEZING: 0

## 2025-05-09 NOTE — PROGRESS NOTES
hypertension  controlled   -     amLODIPine (NORVASC) 10 MG tablet; Take 1 tablet by mouth daily  -     labetalol (NORMODYNE) 100 MG tablet; Take 1 tablet by mouth every evening  -     labetalol (NORMODYNE) 200 MG tablet; Take 1 tablet by mouth every morning    Pure hypercholesterolemia  -     simvastatin (ZOCOR) 20 MG tablet; Take 1 tablet by mouth nightly      Pre-diabetes A1c 6.3   cont reg daily exercise + low carb diet . AM sugars 105 !      Anxiety  on Ativan prn only . Doing well Dr Miguel       Weight loss   losing steadily            Other iron deficiency anemia     Conoloscopy 11/5/24  polyps F/U 3 yrs   -     omeprazole (PRILOSEC) 40 MG delayed release capsule; Take 1 capsule by mouth 2 times daily  -     Polysaccharide Iron Complex (PROFE) 391.3 (180 Fe) MG CAPS; Take 1 capsule by mouth daily (with breakfast)  -     AFL - Newton Calle MD, Gastroenterology (ERCP & EUS), [facility]      Neuro-endocrine cancer (HCC)  Conoloscopy 11/5/24  polyps F/U 3 yrs     S/P resection 7/21/21  C/O Dr Calle   11/24  and Dr Dewitt   5/24  . Sees them alternating every 6 mths   -     omeprazole (PRILOSEC) 40 MG delayed release capsule; Take 1 capsule by mouth every morning (before breakfast)                 Plan:

## 2025-05-09 NOTE — TELEPHONE ENCOUNTER
Patient came into office & dropped of documentation for her health exam with [facility] done on 3/24/25; copy of this information is placed in pcp bin

## 2025-05-12 ENCOUNTER — OFFICE VISIT (OUTPATIENT)
Dept: PSYCHOLOGY | Age: 75
End: 2025-05-12
Payer: MEDICARE

## 2025-05-12 DIAGNOSIS — F43.22 ADJUSTMENT DISORDER WITH ANXIOUS MOOD: Primary | ICD-10-CM

## 2025-05-12 DIAGNOSIS — Z63.0 MARITAL CONFLICT: ICD-10-CM

## 2025-05-12 PROCEDURE — 1123F ACP DISCUSS/DSCN MKR DOCD: CPT | Performed by: STUDENT IN AN ORGANIZED HEALTH CARE EDUCATION/TRAINING PROGRAM

## 2025-05-12 PROCEDURE — 90832 PSYTX W PT 30 MINUTES: CPT | Performed by: STUDENT IN AN ORGANIZED HEALTH CARE EDUCATION/TRAINING PROGRAM

## 2025-05-12 SDOH — SOCIAL STABILITY - SOCIAL INSECURITY: PROBLEMS IN RELATIONSHIP WITH SPOUSE OR PARTNER: Z63.0

## 2025-05-12 NOTE — PROGRESS NOTES
Behavioral Health Consultation  Ramirez Singer PsyD  Licensed Psychologist  5/12/2025        Time spent with client or family: 35 minutes  This is patient's seventh [facility] appointment.    Reason for Consult:  Stress    Referring Provider: No referring provider defined for this encounter.    Feedback given to referring provider.    S:  Patient shared that she enjoyed her Mother's Day weekend and received gifts from her daughter.  Patient reported that she is getting better at interacting with her , and not getting dragged into an argument.  Patient shared she is more aware that often he tries to start conversations or conflicts prior to her needing to be somewhere for an appointment.  Patient shared that she feels that she has been more clear and direct in her language and communication.  Patient reported that she is feeling a lot better, and that her daughter continues to tell her that she seems to be doing better.  Patient shared that she had invited out to [place] by her son, nico but that she does not know if she will be able to go.  Patient shared she is worried to leave her  alone to the becoming more forgetful.    O:  MSE:  Appearance    alert, cooperative, crying  Appetite normal  Sleep disturbance No  Fatigue No  Loss of pleasure No  Impulsive behavior No  Speech    normal rate, normal volume, and well articulated  Mood    Guilty  euthymic   Affect    normal affect  Thought Content    excessive guilt and excessive preoccupations  Thought Process    linear, goal directed, and coherent  Associations    logical connections  Insight    Good  Judgment    Intact  Orientation    oriented to person, place, time, and general circumstances  Memory    recent and remote memory intact  Attention/Concentration    intact  Morbid ideation No  Suicide Assessment    no suicidal ideation    A:  Thao presents for a follow up [facility] appointment regarding concerns related to stress.  Patient's external stressors

## 2025-05-25 ASSESSMENT — PATIENT HEALTH QUESTIONNAIRE - PHQ9
2. FEELING DOWN, DEPRESSED OR HOPELESS: NOT AT ALL
1. LITTLE INTEREST OR PLEASURE IN DOING THINGS: NOT AT ALL
SUM OF ALL RESPONSES TO PHQ QUESTIONS 1-9: 1
SUM OF ALL RESPONSES TO PHQ QUESTIONS 1-9: 1
7. TROUBLE CONCENTRATING ON THINGS, SUCH AS READING THE NEWSPAPER OR WATCHING TELEVISION: NOT AT ALL
6. FEELING BAD ABOUT YOURSELF - OR THAT YOU ARE A FAILURE OR HAVE LET YOURSELF OR YOUR FAMILY DOWN: NOT AT ALL
5. POOR APPETITE OR OVEREATING: NOT AT ALL
4. FEELING TIRED OR HAVING LITTLE ENERGY: SEVERAL DAYS
3. TROUBLE FALLING OR STAYING ASLEEP: NOT AT ALL
8. MOVING OR SPEAKING SO SLOWLY THAT OTHER PEOPLE COULD HAVE NOTICED. OR THE OPPOSITE, BEING SO FIGETY OR RESTLESS THAT YOU HAVE BEEN MOVING AROUND A LOT MORE THAN USUAL: NOT AT ALL
9. THOUGHTS THAT YOU WOULD BE BETTER OFF DEAD, OR OF HURTING YOURSELF: NOT AT ALL
4. FEELING TIRED OR HAVING LITTLE ENERGY: SEVERAL DAYS
5. POOR APPETITE OR OVEREATING: NOT AT ALL
3. TROUBLE FALLING OR STAYING ASLEEP: NOT AT ALL
SUM OF ALL RESPONSES TO PHQ QUESTIONS 1-9: 1
SUM OF ALL RESPONSES TO PHQ QUESTIONS 1-9: 1
7. TROUBLE CONCENTRATING ON THINGS, SUCH AS READING THE NEWSPAPER OR WATCHING TELEVISION: NOT AT ALL
SUM OF ALL RESPONSES TO PHQ QUESTIONS 1-9: 1
9. THOUGHTS THAT YOU WOULD BE BETTER OFF DEAD, OR OF HURTING YOURSELF: NOT AT ALL
2. FEELING DOWN, DEPRESSED OR HOPELESS: NOT AT ALL
8. MOVING OR SPEAKING SO SLOWLY THAT OTHER PEOPLE COULD HAVE NOTICED. OR THE OPPOSITE - BEING SO FIDGETY OR RESTLESS THAT YOU HAVE BEEN MOVING AROUND A LOT MORE THAN USUAL: NOT AT ALL
10. IF YOU CHECKED OFF ANY PROBLEMS, HOW DIFFICULT HAVE THESE PROBLEMS MADE IT FOR YOU TO DO YOUR WORK, TAKE CARE OF THINGS AT HOME, OR GET ALONG WITH OTHER PEOPLE: NOT DIFFICULT AT ALL
10. IF YOU CHECKED OFF ANY PROBLEMS, HOW DIFFICULT HAVE THESE PROBLEMS MADE IT FOR YOU TO DO YOUR WORK, TAKE CARE OF THINGS AT HOME, OR GET ALONG WITH OTHER PEOPLE: NOT DIFFICULT AT ALL
1. LITTLE INTEREST OR PLEASURE IN DOING THINGS: NOT AT ALL
6. FEELING BAD ABOUT YOURSELF - OR THAT YOU ARE A FAILURE OR HAVE LET YOURSELF OR YOUR FAMILY DOWN: NOT AT ALL

## 2025-05-25 ASSESSMENT — ANXIETY QUESTIONNAIRES
IF YOU CHECKED OFF ANY PROBLEMS ON THIS QUESTIONNAIRE, HOW DIFFICULT HAVE THESE PROBLEMS MADE IT FOR YOU TO DO YOUR WORK, TAKE CARE OF THINGS AT HOME, OR GET ALONG WITH OTHER PEOPLE: NOT DIFFICULT AT ALL
4. TROUBLE RELAXING: SEVERAL DAYS
1. FEELING NERVOUS, ANXIOUS, OR ON EDGE: SEVERAL DAYS
4. TROUBLE RELAXING: SEVERAL DAYS
2. NOT BEING ABLE TO STOP OR CONTROL WORRYING: SEVERAL DAYS
2. NOT BEING ABLE TO STOP OR CONTROL WORRYING: SEVERAL DAYS
3. WORRYING TOO MUCH ABOUT DIFFERENT THINGS: SEVERAL DAYS
5. BEING SO RESTLESS THAT IT IS HARD TO SIT STILL: NOT AT ALL
1. FEELING NERVOUS, ANXIOUS, OR ON EDGE: SEVERAL DAYS
GAD7 TOTAL SCORE: 5
6. BECOMING EASILY ANNOYED OR IRRITABLE: SEVERAL DAYS
5. BEING SO RESTLESS THAT IT IS HARD TO SIT STILL: NOT AT ALL
3. WORRYING TOO MUCH ABOUT DIFFERENT THINGS: SEVERAL DAYS
7. FEELING AFRAID AS IF SOMETHING AWFUL MIGHT HAPPEN: NOT AT ALL
IF YOU CHECKED OFF ANY PROBLEMS ON THIS QUESTIONNAIRE, HOW DIFFICULT HAVE THESE PROBLEMS MADE IT FOR YOU TO DO YOUR WORK, TAKE CARE OF THINGS AT HOME, OR GET ALONG WITH OTHER PEOPLE: NOT DIFFICULT AT ALL
7. FEELING AFRAID AS IF SOMETHING AWFUL MIGHT HAPPEN: NOT AT ALL
6. BECOMING EASILY ANNOYED OR IRRITABLE: SEVERAL DAYS

## 2025-05-28 ENCOUNTER — OFFICE VISIT (OUTPATIENT)
Dept: PSYCHOLOGY | Age: 75
End: 2025-05-28
Payer: MEDICARE

## 2025-05-28 DIAGNOSIS — F43.22 ADJUSTMENT DISORDER WITH ANXIOUS MOOD: Primary | ICD-10-CM

## 2025-05-28 DIAGNOSIS — Z63.0 MARITAL CONFLICT: ICD-10-CM

## 2025-05-28 PROCEDURE — 90832 PSYTX W PT 30 MINUTES: CPT | Performed by: STUDENT IN AN ORGANIZED HEALTH CARE EDUCATION/TRAINING PROGRAM

## 2025-05-28 PROCEDURE — 1123F ACP DISCUSS/DSCN MKR DOCD: CPT | Performed by: STUDENT IN AN ORGANIZED HEALTH CARE EDUCATION/TRAINING PROGRAM

## 2025-05-28 SDOH — SOCIAL STABILITY - SOCIAL INSECURITY: PROBLEMS IN RELATIONSHIP WITH SPOUSE OR PARTNER: Z63.0

## 2025-05-28 NOTE — PROGRESS NOTES
Behavioral Health Consultation  Ramirez Singer PsyD  Licensed Psychologist  5/28/2025        Time spent with client or family: 35 minutes  This is patient's eighth [facility] appointment.    Reason for Consult: Relationship conflict and stress    Referring Provider: No referring provider defined for this encounter.    Feedback given to referring provider.    S:  Patient shared that she walked to session on this day due to construction going on her neighborhood, and her  making a mistake.  Patient expressed that she is angry with her , and frustrated with him not following through on tasks and blaming his brain.  Patient shared that she feels that she does not receive in the reciprocity or adequate apologies with her  when he is starting on.  Patient shared that she feels tired of arguing all the time.  Patient reflected on her daughter's relationship and the low that she and her  share, the patient expressed that \"I do not get that\".    O:  MSE:  Appearance    alert, cooperative, crying, moderate distress  Appetite normal  Sleep disturbance No  Fatigue Yes  Loss of pleasure No  Impulsive behavior No  Speech    normal rate, normal volume, and well articulated  Mood    Irritability  Sadness  Disappointment  Affect    labile affect  Thought Content    hopelessness, excessive guilt, and excessive preoccupations  Thought Process    linear, goal directed, and coherent  Associations    logical connections  Insight    Good  Judgment    Intact  Orientation    oriented to person, place, time, and general circumstances  Memory    recent and remote memory intact  Attention/Concentration    intact  Morbid ideation No  Suicide Assessment    no suicidal ideation    A:  Thao presents for a follow up [facility] appointment regarding concerns related to stress and relationship conflict.  Patient is growing in frustration with her , and they seem experienced more conflicts.  Patient is talking more during

## 2025-05-29 ENCOUNTER — TRANSCRIBE ORDERS (OUTPATIENT)
Dept: ADMINISTRATIVE | Age: 75
End: 2025-05-29

## 2025-05-29 DIAGNOSIS — Z78.0 MENOPAUSE: Primary | ICD-10-CM

## 2025-06-08 ASSESSMENT — PATIENT HEALTH QUESTIONNAIRE - PHQ9
2. FEELING DOWN, DEPRESSED OR HOPELESS: NOT AT ALL
6. FEELING BAD ABOUT YOURSELF - OR THAT YOU ARE A FAILURE OR HAVE LET YOURSELF OR YOUR FAMILY DOWN: NOT AT ALL
SUM OF ALL RESPONSES TO PHQ QUESTIONS 1-9: 0
SUM OF ALL RESPONSES TO PHQ QUESTIONS 1-9: 0
5. POOR APPETITE OR OVEREATING: NOT AT ALL
SUM OF ALL RESPONSES TO PHQ QUESTIONS 1-9: 0
7. TROUBLE CONCENTRATING ON THINGS, SUCH AS READING THE NEWSPAPER OR WATCHING TELEVISION: NOT AT ALL
6. FEELING BAD ABOUT YOURSELF - OR THAT YOU ARE A FAILURE OR HAVE LET YOURSELF OR YOUR FAMILY DOWN: NOT AT ALL
9. THOUGHTS THAT YOU WOULD BE BETTER OFF DEAD, OR OF HURTING YOURSELF: NOT AT ALL
5. POOR APPETITE OR OVEREATING: NOT AT ALL
SUM OF ALL RESPONSES TO PHQ QUESTIONS 1-9: 0
10. IF YOU CHECKED OFF ANY PROBLEMS, HOW DIFFICULT HAVE THESE PROBLEMS MADE IT FOR YOU TO DO YOUR WORK, TAKE CARE OF THINGS AT HOME, OR GET ALONG WITH OTHER PEOPLE: NOT DIFFICULT AT ALL
3. TROUBLE FALLING OR STAYING ASLEEP: NOT AT ALL
1. LITTLE INTEREST OR PLEASURE IN DOING THINGS: NOT AT ALL
2. FEELING DOWN, DEPRESSED OR HOPELESS: NOT AT ALL
3. TROUBLE FALLING OR STAYING ASLEEP: NOT AT ALL
7. TROUBLE CONCENTRATING ON THINGS, SUCH AS READING THE NEWSPAPER OR WATCHING TELEVISION: NOT AT ALL
10. IF YOU CHECKED OFF ANY PROBLEMS, HOW DIFFICULT HAVE THESE PROBLEMS MADE IT FOR YOU TO DO YOUR WORK, TAKE CARE OF THINGS AT HOME, OR GET ALONG WITH OTHER PEOPLE: NOT DIFFICULT AT ALL
9. THOUGHTS THAT YOU WOULD BE BETTER OFF DEAD, OR OF HURTING YOURSELF: NOT AT ALL
8. MOVING OR SPEAKING SO SLOWLY THAT OTHER PEOPLE COULD HAVE NOTICED. OR THE OPPOSITE, BEING SO FIGETY OR RESTLESS THAT YOU HAVE BEEN MOVING AROUND A LOT MORE THAN USUAL: NOT AT ALL
SUM OF ALL RESPONSES TO PHQ QUESTIONS 1-9: 0
4. FEELING TIRED OR HAVING LITTLE ENERGY: NOT AT ALL
1. LITTLE INTEREST OR PLEASURE IN DOING THINGS: NOT AT ALL
4. FEELING TIRED OR HAVING LITTLE ENERGY: NOT AT ALL
8. MOVING OR SPEAKING SO SLOWLY THAT OTHER PEOPLE COULD HAVE NOTICED. OR THE OPPOSITE - BEING SO FIDGETY OR RESTLESS THAT YOU HAVE BEEN MOVING AROUND A LOT MORE THAN USUAL: NOT AT ALL

## 2025-06-08 ASSESSMENT — ANXIETY QUESTIONNAIRES
2. NOT BEING ABLE TO STOP OR CONTROL WORRYING: NOT AT ALL
4. TROUBLE RELAXING: NOT AT ALL
3. WORRYING TOO MUCH ABOUT DIFFERENT THINGS: NOT AT ALL
IF YOU CHECKED OFF ANY PROBLEMS ON THIS QUESTIONNAIRE, HOW DIFFICULT HAVE THESE PROBLEMS MADE IT FOR YOU TO DO YOUR WORK, TAKE CARE OF THINGS AT HOME, OR GET ALONG WITH OTHER PEOPLE: NOT DIFFICULT AT ALL
IF YOU CHECKED OFF ANY PROBLEMS ON THIS QUESTIONNAIRE, HOW DIFFICULT HAVE THESE PROBLEMS MADE IT FOR YOU TO DO YOUR WORK, TAKE CARE OF THINGS AT HOME, OR GET ALONG WITH OTHER PEOPLE: NOT DIFFICULT AT ALL
3. WORRYING TOO MUCH ABOUT DIFFERENT THINGS: NOT AT ALL
7. FEELING AFRAID AS IF SOMETHING AWFUL MIGHT HAPPEN: NOT AT ALL
1. FEELING NERVOUS, ANXIOUS, OR ON EDGE: NOT AT ALL
1. FEELING NERVOUS, ANXIOUS, OR ON EDGE: NOT AT ALL
7. FEELING AFRAID AS IF SOMETHING AWFUL MIGHT HAPPEN: NOT AT ALL
4. TROUBLE RELAXING: NOT AT ALL
6. BECOMING EASILY ANNOYED OR IRRITABLE: NOT AT ALL
GAD7 TOTAL SCORE: 0
5. BEING SO RESTLESS THAT IT IS HARD TO SIT STILL: NOT AT ALL
2. NOT BEING ABLE TO STOP OR CONTROL WORRYING: NOT AT ALL
6. BECOMING EASILY ANNOYED OR IRRITABLE: NOT AT ALL
5. BEING SO RESTLESS THAT IT IS HARD TO SIT STILL: NOT AT ALL

## 2025-06-11 ENCOUNTER — OFFICE VISIT (OUTPATIENT)
Dept: PSYCHOLOGY | Age: 75
End: 2025-06-11
Payer: MEDICARE

## 2025-06-11 DIAGNOSIS — Z63.0 MARITAL CONFLICT: ICD-10-CM

## 2025-06-11 DIAGNOSIS — F43.22 ADJUSTMENT DISORDER WITH ANXIOUS MOOD: Primary | ICD-10-CM

## 2025-06-11 PROCEDURE — 90832 PSYTX W PT 30 MINUTES: CPT | Performed by: STUDENT IN AN ORGANIZED HEALTH CARE EDUCATION/TRAINING PROGRAM

## 2025-06-11 PROCEDURE — 1123F ACP DISCUSS/DSCN MKR DOCD: CPT | Performed by: STUDENT IN AN ORGANIZED HEALTH CARE EDUCATION/TRAINING PROGRAM

## 2025-06-11 SDOH — SOCIAL STABILITY - SOCIAL INSECURITY: PROBLEMS IN RELATIONSHIP WITH SPOUSE OR PARTNER: Z63.0

## 2025-06-11 NOTE — PROGRESS NOTES
and relationship.  Patient has tried to engage in behaviors to help her environment feel more inviting and more peaceful.  Patient is doing things such as getting out of the house more and spending more time with her friends.  Patient was reflecting a lot on comments that her  makes, and constant criticism that he makes about her being \"fake\".  Patient feels that she is authentic and that she is trying to have fun when she is with others.  Patient has visitation that the [place] trip, and some fantasy about being able to travel alone.  Ultimately patient is committed to her relationship, but her conflict with her  is pretty significant and causes her to question if she is able to state without especially when his cognitive functions continue to decline and lead to other behavioral concerns.  For these symptoms are stable.  Safety concerns reported include: None patient denied having any suicidal or homicidal ideation and is not considered a risk to harm herself or others.    Diagnosis:  Encounter Diagnoses   Name Primary?    Adjustment disorder with anxious mood Yes    Marital conflict      Plan:  Pt interventions:  Reviewed progress and provided praise for effective coping. Trained in strategies for increasing balanced thinking, Trained in improving communication skills, [place]-setting to identify pt's primary goals for [facility] visit / overall health, Supportive techniques, and Emphasized self-care as important for managing overall health    Pt Behavioral Change Plan:   See above

## 2025-06-17 ENCOUNTER — HOSPITAL ENCOUNTER (OUTPATIENT)
Dept: WOMENS IMAGING | Age: 75
Discharge: HOME | End: 2025-06-17
Payer: MEDICARE

## 2025-06-17 DIAGNOSIS — Z78.0 MENOPAUSE: ICD-10-CM

## 2025-06-17 PROCEDURE — 77080 DXA BONE DENSITY AXIAL: CPT

## 2025-06-30 ENCOUNTER — OFFICE VISIT (OUTPATIENT)
Dept: PSYCHOLOGY | Age: 75
End: 2025-06-30
Payer: MEDICARE

## 2025-06-30 DIAGNOSIS — F43.22 ADJUSTMENT DISORDER WITH ANXIOUS MOOD: Primary | ICD-10-CM

## 2025-06-30 DIAGNOSIS — Z63.0 RELATIONSHIP PARTNER UNWELL: ICD-10-CM

## 2025-06-30 DIAGNOSIS — Z63.0 MARITAL CONFLICT: ICD-10-CM

## 2025-06-30 PROCEDURE — 1123F ACP DISCUSS/DSCN MKR DOCD: CPT | Performed by: STUDENT IN AN ORGANIZED HEALTH CARE EDUCATION/TRAINING PROGRAM

## 2025-06-30 PROCEDURE — 90832 PSYTX W PT 30 MINUTES: CPT | Performed by: STUDENT IN AN ORGANIZED HEALTH CARE EDUCATION/TRAINING PROGRAM

## 2025-06-30 SDOH — SOCIAL STABILITY - SOCIAL INSECURITY: PROBLEMS IN RELATIONSHIP WITH SPOUSE OR PARTNER: Z63.0

## 2025-06-30 ASSESSMENT — ANXIETY QUESTIONNAIRES
5. BEING SO RESTLESS THAT IT IS HARD TO SIT STILL: NOT AT ALL
7. FEELING AFRAID AS IF SOMETHING AWFUL MIGHT HAPPEN: NOT AT ALL
7. FEELING AFRAID AS IF SOMETHING AWFUL MIGHT HAPPEN: NOT AT ALL
IF YOU CHECKED OFF ANY PROBLEMS ON THIS QUESTIONNAIRE, HOW DIFFICULT HAVE THESE PROBLEMS MADE IT FOR YOU TO DO YOUR WORK, TAKE CARE OF THINGS AT HOME, OR GET ALONG WITH OTHER PEOPLE: NOT DIFFICULT AT ALL
IF YOU CHECKED OFF ANY PROBLEMS ON THIS QUESTIONNAIRE, HOW DIFFICULT HAVE THESE PROBLEMS MADE IT FOR YOU TO DO YOUR WORK, TAKE CARE OF THINGS AT HOME, OR GET ALONG WITH OTHER PEOPLE: NOT DIFFICULT AT ALL
3. WORRYING TOO MUCH ABOUT DIFFERENT THINGS: NOT AT ALL
6. BECOMING EASILY ANNOYED OR IRRITABLE: MORE THAN HALF THE DAYS
1. FEELING NERVOUS, ANXIOUS, OR ON EDGE: MORE THAN HALF THE DAYS
4. TROUBLE RELAXING: NOT AT ALL
6. BECOMING EASILY ANNOYED OR IRRITABLE: MORE THAN HALF THE DAYS
3. WORRYING TOO MUCH ABOUT DIFFERENT THINGS: NOT AT ALL
4. TROUBLE RELAXING: NOT AT ALL
5. BEING SO RESTLESS THAT IT IS HARD TO SIT STILL: NOT AT ALL
1. FEELING NERVOUS, ANXIOUS, OR ON EDGE: MORE THAN HALF THE DAYS

## 2025-06-30 NOTE — PROGRESS NOTES
Behavioral Health Consultation  Ramirez Singer PsyD  Licensed Psychologist  6/30/2025        Time spent with client or family: 26 minutes  This is patient's tenth [facility] appointment.    Reason for Consult:  Relationship conflict and stress    Referring Provider: No referring provider defined for this encounter.    Feedback given to referring provider.    S:  Patient reported that he just returned from their [place] trip.  Patient expressed some frustration due to her  forgetting his tablet and CPAP machine that override.  Patient shared she felt guilty for not remembering for him, and reflected on how it feels that her  is forgetting more and more.  Patient shared that he thinks that he should see his doctor, and she agrees.  Patient shared that she and her daughter are going to get him connected to his doctor and discuss some of his declining behaviors.  Patient expressed her desire for her  to just be okay.  Patient shared that she enjoyed her time in [place] for the most part    O:  MSE:  Appearance    alert, cooperative, mild distress  Appetite normal  Sleep disturbance patient reported that their flight came in this morning, and that she did not sleep  Fatigue Yes  Loss of pleasure No  Impulsive behavior No  Speech    normal rate, normal volume, and well articulated  Mood    Anxious  Guilty   Affect    anxiety  Thought Content    excessive guilt, excessive preoccupations, and intrusive thoughts  Thought Process    linear, goal directed, and coherent  Associations    logical connections  Insight    Good  Judgment    Intact  Orientation    oriented to person, place, time, and general circumstances  Memory    recent and remote memory intact  Attention/Concentration    intact  Morbid ideation No  Suicide Assessment    no suicidal ideation    A:  Thao presents for a follow up [facility] appointment regarding concerns related to anxiety and stress.  Patient has increased stress due to her 's

## 2025-07-12 ASSESSMENT — PATIENT HEALTH QUESTIONNAIRE - PHQ9
2. FEELING DOWN, DEPRESSED OR HOPELESS: NOT AT ALL
2. FEELING DOWN, DEPRESSED OR HOPELESS: NOT AT ALL
9. THOUGHTS THAT YOU WOULD BE BETTER OFF DEAD, OR OF HURTING YOURSELF: NOT AT ALL
10. IF YOU CHECKED OFF ANY PROBLEMS, HOW DIFFICULT HAVE THESE PROBLEMS MADE IT FOR YOU TO DO YOUR WORK, TAKE CARE OF THINGS AT HOME, OR GET ALONG WITH OTHER PEOPLE: NOT DIFFICULT AT ALL
5. POOR APPETITE OR OVEREATING: NOT AT ALL
10. IF YOU CHECKED OFF ANY PROBLEMS, HOW DIFFICULT HAVE THESE PROBLEMS MADE IT FOR YOU TO DO YOUR WORK, TAKE CARE OF THINGS AT HOME, OR GET ALONG WITH OTHER PEOPLE: NOT DIFFICULT AT ALL
9. THOUGHTS THAT YOU WOULD BE BETTER OFF DEAD, OR OF HURTING YOURSELF: NOT AT ALL
3. TROUBLE FALLING OR STAYING ASLEEP: NOT AT ALL
8. MOVING OR SPEAKING SO SLOWLY THAT OTHER PEOPLE COULD HAVE NOTICED. OR THE OPPOSITE, BEING SO FIGETY OR RESTLESS THAT YOU HAVE BEEN MOVING AROUND A LOT MORE THAN USUAL: NOT AT ALL
4. FEELING TIRED OR HAVING LITTLE ENERGY: NOT AT ALL
SUM OF ALL RESPONSES TO PHQ QUESTIONS 1-9: 0
3. TROUBLE FALLING OR STAYING ASLEEP: NOT AT ALL
SUM OF ALL RESPONSES TO PHQ QUESTIONS 1-9: 0
6. FEELING BAD ABOUT YOURSELF - OR THAT YOU ARE A FAILURE OR HAVE LET YOURSELF OR YOUR FAMILY DOWN: NOT AT ALL
8. MOVING OR SPEAKING SO SLOWLY THAT OTHER PEOPLE COULD HAVE NOTICED. OR THE OPPOSITE - BEING SO FIDGETY OR RESTLESS THAT YOU HAVE BEEN MOVING AROUND A LOT MORE THAN USUAL: NOT AT ALL
4. FEELING TIRED OR HAVING LITTLE ENERGY: NOT AT ALL
SUM OF ALL RESPONSES TO PHQ QUESTIONS 1-9: 0
6. FEELING BAD ABOUT YOURSELF - OR THAT YOU ARE A FAILURE OR HAVE LET YOURSELF OR YOUR FAMILY DOWN: NOT AT ALL
7. TROUBLE CONCENTRATING ON THINGS, SUCH AS READING THE NEWSPAPER OR WATCHING TELEVISION: NOT AT ALL
1. LITTLE INTEREST OR PLEASURE IN DOING THINGS: NOT AT ALL
1. LITTLE INTEREST OR PLEASURE IN DOING THINGS: NOT AT ALL
5. POOR APPETITE OR OVEREATING: NOT AT ALL
7. TROUBLE CONCENTRATING ON THINGS, SUCH AS READING THE NEWSPAPER OR WATCHING TELEVISION: NOT AT ALL

## 2025-07-12 ASSESSMENT — ANXIETY QUESTIONNAIRES
IF YOU CHECKED OFF ANY PROBLEMS ON THIS QUESTIONNAIRE, HOW DIFFICULT HAVE THESE PROBLEMS MADE IT FOR YOU TO DO YOUR WORK, TAKE CARE OF THINGS AT HOME, OR GET ALONG WITH OTHER PEOPLE: NOT DIFFICULT AT ALL
3. WORRYING TOO MUCH ABOUT DIFFERENT THINGS: NOT AT ALL
2. NOT BEING ABLE TO STOP OR CONTROL WORRYING: NOT AT ALL
2. NOT BEING ABLE TO STOP OR CONTROL WORRYING: NOT AT ALL
7. FEELING AFRAID AS IF SOMETHING AWFUL MIGHT HAPPEN: NOT AT ALL
GAD7 TOTAL SCORE: 1
4. TROUBLE RELAXING: NOT AT ALL
1. FEELING NERVOUS, ANXIOUS, OR ON EDGE: SEVERAL DAYS
7. FEELING AFRAID AS IF SOMETHING AWFUL MIGHT HAPPEN: NOT AT ALL
IF YOU CHECKED OFF ANY PROBLEMS ON THIS QUESTIONNAIRE, HOW DIFFICULT HAVE THESE PROBLEMS MADE IT FOR YOU TO DO YOUR WORK, TAKE CARE OF THINGS AT HOME, OR GET ALONG WITH OTHER PEOPLE: NOT DIFFICULT AT ALL
1. FEELING NERVOUS, ANXIOUS, OR ON EDGE: SEVERAL DAYS
6. BECOMING EASILY ANNOYED OR IRRITABLE: NOT AT ALL
4. TROUBLE RELAXING: NOT AT ALL
3. WORRYING TOO MUCH ABOUT DIFFERENT THINGS: NOT AT ALL
5. BEING SO RESTLESS THAT IT IS HARD TO SIT STILL: NOT AT ALL
5. BEING SO RESTLESS THAT IT IS HARD TO SIT STILL: NOT AT ALL
6. BECOMING EASILY ANNOYED OR IRRITABLE: NOT AT ALL

## 2025-07-14 ENCOUNTER — OFFICE VISIT (OUTPATIENT)
Dept: PSYCHOLOGY | Age: 75
End: 2025-07-14
Payer: MEDICARE

## 2025-07-14 DIAGNOSIS — Z63.0 MARITAL CONFLICT: ICD-10-CM

## 2025-07-14 DIAGNOSIS — F43.22 ADJUSTMENT DISORDER WITH ANXIOUS MOOD: Primary | ICD-10-CM

## 2025-07-14 DIAGNOSIS — Z63.0 RELATIONSHIP PARTNER UNWELL: ICD-10-CM

## 2025-07-14 PROCEDURE — 1123F ACP DISCUSS/DSCN MKR DOCD: CPT | Performed by: STUDENT IN AN ORGANIZED HEALTH CARE EDUCATION/TRAINING PROGRAM

## 2025-07-14 PROCEDURE — 90832 PSYTX W PT 30 MINUTES: CPT | Performed by: STUDENT IN AN ORGANIZED HEALTH CARE EDUCATION/TRAINING PROGRAM

## 2025-07-14 SDOH — SOCIAL STABILITY - SOCIAL INSECURITY: PROBLEMS IN RELATIONSHIP WITH SPOUSE OR PARTNER: Z63.0

## 2025-07-14 NOTE — PROGRESS NOTES
Behavioral Health Consultation  Ramirez Singer PsyD  Licensed Psychologist  7/14/2025        Time spent with client or family: 33 minutes  This is patient's 11 [facility] appointment.    Reason for Consult: Stress and relational conflict    Referring Provider: No referring provider defined for this encounter.    Feedback given to referring provider.    S:  Patient shared that she is worried about her , due to him forgetting things more increasingly.  Patient expressed that she believes he needs to see his doctor and that he is getting worse with his memory issues.  Patient shared that her grandson was recently diagnosed with Crohn's disease, but that they feel confident about his prognosis.    O:  MSE:  Appearance    alert, cooperative, moderate distress  Appetite normal  Sleep disturbance No  Fatigue No  Loss of pleasure No  Impulsive behavior No  Speech    normal rate, normal volume, and well articulated  Mood    Anxious  Angry   Affect    normal affect  Thought Content    excessive guilt and excessive preoccupations  Thought Process    linear, goal directed, and coherent  Associations    logical connections  Insight    Good  Judgment    Intact  Orientation    oriented to person, place, time, and general circumstances  Memory    recent and remote memory intact  Attention/Concentration    intact  Morbid ideation No  Suicide Assessment    no suicidal ideation    A:  Thao presents for a follow up [facility] appointment regarding concerns related to stress and agitation.  These symptoms are improving somewhat.  Patient's main source of stress and concern comes from her 's health.  Patient's  has a health condition that impacts his cognitive functioning.  Patient and  experiencing conflict from time to time.  However, the patient is exploring more self-care strategies and their parts of social support.  Safety concerns reported include: None.  Patient denied any homicidal or suicidal ideation and is

## 2025-07-27 ASSESSMENT — PATIENT HEALTH QUESTIONNAIRE - PHQ9
SUM OF ALL RESPONSES TO PHQ QUESTIONS 1-9: 0
4. FEELING TIRED OR HAVING LITTLE ENERGY: NOT AT ALL
8. MOVING OR SPEAKING SO SLOWLY THAT OTHER PEOPLE COULD HAVE NOTICED. OR THE OPPOSITE - BEING SO FIDGETY OR RESTLESS THAT YOU HAVE BEEN MOVING AROUND A LOT MORE THAN USUAL: NOT AT ALL
SUM OF ALL RESPONSES TO PHQ QUESTIONS 1-9: 0
SUM OF ALL RESPONSES TO PHQ QUESTIONS 1-9: 0
6. FEELING BAD ABOUT YOURSELF - OR THAT YOU ARE A FAILURE OR HAVE LET YOURSELF OR YOUR FAMILY DOWN: NOT AT ALL
9. THOUGHTS THAT YOU WOULD BE BETTER OFF DEAD, OR OF HURTING YOURSELF: NOT AT ALL
1. LITTLE INTEREST OR PLEASURE IN DOING THINGS: NOT AT ALL
7. TROUBLE CONCENTRATING ON THINGS, SUCH AS READING THE NEWSPAPER OR WATCHING TELEVISION: NOT AT ALL
2. FEELING DOWN, DEPRESSED OR HOPELESS: NOT AT ALL
6. FEELING BAD ABOUT YOURSELF - OR THAT YOU ARE A FAILURE OR HAVE LET YOURSELF OR YOUR FAMILY DOWN: NOT AT ALL
SUM OF ALL RESPONSES TO PHQ QUESTIONS 1-9: 0
7. TROUBLE CONCENTRATING ON THINGS, SUCH AS READING THE NEWSPAPER OR WATCHING TELEVISION: NOT AT ALL
2. FEELING DOWN, DEPRESSED OR HOPELESS: NOT AT ALL
1. LITTLE INTEREST OR PLEASURE IN DOING THINGS: NOT AT ALL
10. IF YOU CHECKED OFF ANY PROBLEMS, HOW DIFFICULT HAVE THESE PROBLEMS MADE IT FOR YOU TO DO YOUR WORK, TAKE CARE OF THINGS AT HOME, OR GET ALONG WITH OTHER PEOPLE: NOT DIFFICULT AT ALL
8. MOVING OR SPEAKING SO SLOWLY THAT OTHER PEOPLE COULD HAVE NOTICED. OR THE OPPOSITE, BEING SO FIGETY OR RESTLESS THAT YOU HAVE BEEN MOVING AROUND A LOT MORE THAN USUAL: NOT AT ALL
3. TROUBLE FALLING OR STAYING ASLEEP: NOT AT ALL
9. THOUGHTS THAT YOU WOULD BE BETTER OFF DEAD, OR OF HURTING YOURSELF: NOT AT ALL
10. IF YOU CHECKED OFF ANY PROBLEMS, HOW DIFFICULT HAVE THESE PROBLEMS MADE IT FOR YOU TO DO YOUR WORK, TAKE CARE OF THINGS AT HOME, OR GET ALONG WITH OTHER PEOPLE: NOT DIFFICULT AT ALL
4. FEELING TIRED OR HAVING LITTLE ENERGY: NOT AT ALL
5. POOR APPETITE OR OVEREATING: NOT AT ALL
SUM OF ALL RESPONSES TO PHQ QUESTIONS 1-9: 0
5. POOR APPETITE OR OVEREATING: NOT AT ALL
3. TROUBLE FALLING OR STAYING ASLEEP: NOT AT ALL

## 2025-07-27 ASSESSMENT — ANXIETY QUESTIONNAIRES
1. FEELING NERVOUS, ANXIOUS, OR ON EDGE: NOT AT ALL
1. FEELING NERVOUS, ANXIOUS, OR ON EDGE: NOT AT ALL
4. TROUBLE RELAXING: NOT AT ALL
4. TROUBLE RELAXING: NOT AT ALL
2. NOT BEING ABLE TO STOP OR CONTROL WORRYING: NOT AT ALL
6. BECOMING EASILY ANNOYED OR IRRITABLE: SEVERAL DAYS
7. FEELING AFRAID AS IF SOMETHING AWFUL MIGHT HAPPEN: NOT AT ALL
3. WORRYING TOO MUCH ABOUT DIFFERENT THINGS: NOT AT ALL
7. FEELING AFRAID AS IF SOMETHING AWFUL MIGHT HAPPEN: NOT AT ALL
3. WORRYING TOO MUCH ABOUT DIFFERENT THINGS: NOT AT ALL
6. BECOMING EASILY ANNOYED OR IRRITABLE: SEVERAL DAYS
5. BEING SO RESTLESS THAT IT IS HARD TO SIT STILL: NOT AT ALL
IF YOU CHECKED OFF ANY PROBLEMS ON THIS QUESTIONNAIRE, HOW DIFFICULT HAVE THESE PROBLEMS MADE IT FOR YOU TO DO YOUR WORK, TAKE CARE OF THINGS AT HOME, OR GET ALONG WITH OTHER PEOPLE: NOT DIFFICULT AT ALL
2. NOT BEING ABLE TO STOP OR CONTROL WORRYING: NOT AT ALL
GAD7 TOTAL SCORE: 1
5. BEING SO RESTLESS THAT IT IS HARD TO SIT STILL: NOT AT ALL
IF YOU CHECKED OFF ANY PROBLEMS ON THIS QUESTIONNAIRE, HOW DIFFICULT HAVE THESE PROBLEMS MADE IT FOR YOU TO DO YOUR WORK, TAKE CARE OF THINGS AT HOME, OR GET ALONG WITH OTHER PEOPLE: NOT DIFFICULT AT ALL

## 2025-07-28 ENCOUNTER — OFFICE VISIT (OUTPATIENT)
Dept: PSYCHOLOGY | Age: 75
End: 2025-07-28
Payer: MEDICARE

## 2025-07-28 DIAGNOSIS — F43.22 ADJUSTMENT DISORDER WITH ANXIOUS MOOD: Primary | ICD-10-CM

## 2025-07-28 DIAGNOSIS — Z63.0 RELATIONSHIP PARTNER UNWELL: ICD-10-CM

## 2025-07-28 DIAGNOSIS — Z63.0 MARITAL CONFLICT: ICD-10-CM

## 2025-07-28 PROCEDURE — 90832 PSYTX W PT 30 MINUTES: CPT | Performed by: STUDENT IN AN ORGANIZED HEALTH CARE EDUCATION/TRAINING PROGRAM

## 2025-07-28 PROCEDURE — 1123F ACP DISCUSS/DSCN MKR DOCD: CPT | Performed by: STUDENT IN AN ORGANIZED HEALTH CARE EDUCATION/TRAINING PROGRAM

## 2025-07-28 SDOH — SOCIAL STABILITY - SOCIAL INSECURITY: PROBLEMS IN RELATIONSHIP WITH SPOUSE OR PARTNER: Z63.0

## 2025-07-28 NOTE — PROGRESS NOTES
Behavioral Health Consultation  Ramirez Singer PsyD  Licensed Psychologist  7/28/2025        Time spent with client or family: 35 minutes  This is patient's 12th  [facility] appointment.    Reason for Consult:  Stress and relationship concerns    Referring Provider: No referring provider defined for this encounter.    Feedback given to referring provider.    S:  Patient shared that she get frustrated with her  recently due to him getting lost as he was visiting a friend in the hospital.  Patient expressed her frustration comes from him not accepting help from her.  Patient expressed that she thinks her  also lost her orange paperwork became with a new stove, but that he forgot where he put it.  Patient shared that she is a little tired lately, and that she feels she has to \"do something different\" in her relationship.  Patient shared that she worries about doing more solo activities.  Patient shared she considered going up to [place] for a weekend trip with a family member, but that she worries about leaving her  alone.  Patient shared she has been doing more activities with her friends and her daughter without her  and its been enjoyable.      O:  MSE:  Appearance    alert, cooperative, mild distress  Appetite normal  Sleep disturbance No  Fatigue Yes  Loss of pleasure No  Impulsive behavior No  Speech    normal rate, normal volume, and well articulated  Mood    Anxious  Frustrated  Affect    agitation  Thought Content    excessive preoccupations and guilt  Thought Process    linear, goal directed, and coherent  Associations    logical connections  Insight    Good  Judgment    Intact  Orientation    oriented to person, place, time, and general circumstances  Memory    recent and remote memory intact  Attention/Concentration    intact  Morbid ideation No  Suicide Assessment    no suicidal ideation    A:  Thao presents for a follow up [facility] appointment regarding concerns related to anxiety and

## 2025-08-01 ASSESSMENT — ANXIETY QUESTIONNAIRES
IF YOU CHECKED OFF ANY PROBLEMS ON THIS QUESTIONNAIRE, HOW DIFFICULT HAVE THESE PROBLEMS MADE IT FOR YOU TO DO YOUR WORK, TAKE CARE OF THINGS AT HOME, OR GET ALONG WITH OTHER PEOPLE: NOT DIFFICULT AT ALL
1. FEELING NERVOUS, ANXIOUS, OR ON EDGE: NOT AT ALL
1. FEELING NERVOUS, ANXIOUS, OR ON EDGE: NOT AT ALL
3. WORRYING TOO MUCH ABOUT DIFFERENT THINGS: NOT AT ALL
2. NOT BEING ABLE TO STOP OR CONTROL WORRYING: NOT AT ALL
3. WORRYING TOO MUCH ABOUT DIFFERENT THINGS: NOT AT ALL
7. FEELING AFRAID AS IF SOMETHING AWFUL MIGHT HAPPEN: NOT AT ALL
IF YOU CHECKED OFF ANY PROBLEMS ON THIS QUESTIONNAIRE, HOW DIFFICULT HAVE THESE PROBLEMS MADE IT FOR YOU TO DO YOUR WORK, TAKE CARE OF THINGS AT HOME, OR GET ALONG WITH OTHER PEOPLE: NOT DIFFICULT AT ALL
6. BECOMING EASILY ANNOYED OR IRRITABLE: SEVERAL DAYS
2. NOT BEING ABLE TO STOP OR CONTROL WORRYING: NOT AT ALL
4. TROUBLE RELAXING: NOT AT ALL
5. BEING SO RESTLESS THAT IT IS HARD TO SIT STILL: NOT AT ALL
4. TROUBLE RELAXING: NOT AT ALL
GAD7 TOTAL SCORE: 1
6. BECOMING EASILY ANNOYED OR IRRITABLE: SEVERAL DAYS
7. FEELING AFRAID AS IF SOMETHING AWFUL MIGHT HAPPEN: NOT AT ALL
5. BEING SO RESTLESS THAT IT IS HARD TO SIT STILL: NOT AT ALL

## 2025-08-01 ASSESSMENT — PATIENT HEALTH QUESTIONNAIRE - PHQ9
SUM OF ALL RESPONSES TO PHQ QUESTIONS 1-9: 0
7. TROUBLE CONCENTRATING ON THINGS, SUCH AS READING THE NEWSPAPER OR WATCHING TELEVISION: NOT AT ALL
SUM OF ALL RESPONSES TO PHQ QUESTIONS 1-9: 0
SUM OF ALL RESPONSES TO PHQ QUESTIONS 1-9: 0
5. POOR APPETITE OR OVEREATING: NOT AT ALL
2. FEELING DOWN, DEPRESSED OR HOPELESS: NOT AT ALL
4. FEELING TIRED OR HAVING LITTLE ENERGY: NOT AT ALL
SUM OF ALL RESPONSES TO PHQ QUESTIONS 1-9: 0
2. FEELING DOWN, DEPRESSED OR HOPELESS: NOT AT ALL
8. MOVING OR SPEAKING SO SLOWLY THAT OTHER PEOPLE COULD HAVE NOTICED. OR THE OPPOSITE, BEING SO FIGETY OR RESTLESS THAT YOU HAVE BEEN MOVING AROUND A LOT MORE THAN USUAL: NOT AT ALL
3. TROUBLE FALLING OR STAYING ASLEEP: NOT AT ALL
10. IF YOU CHECKED OFF ANY PROBLEMS, HOW DIFFICULT HAVE THESE PROBLEMS MADE IT FOR YOU TO DO YOUR WORK, TAKE CARE OF THINGS AT HOME, OR GET ALONG WITH OTHER PEOPLE: NOT DIFFICULT AT ALL
9. THOUGHTS THAT YOU WOULD BE BETTER OFF DEAD, OR OF HURTING YOURSELF: NOT AT ALL
SUM OF ALL RESPONSES TO PHQ QUESTIONS 1-9: 0
9. THOUGHTS THAT YOU WOULD BE BETTER OFF DEAD, OR OF HURTING YOURSELF: NOT AT ALL
5. POOR APPETITE OR OVEREATING: NOT AT ALL
8. MOVING OR SPEAKING SO SLOWLY THAT OTHER PEOPLE COULD HAVE NOTICED. OR THE OPPOSITE - BEING SO FIDGETY OR RESTLESS THAT YOU HAVE BEEN MOVING AROUND A LOT MORE THAN USUAL: NOT AT ALL
10. IF YOU CHECKED OFF ANY PROBLEMS, HOW DIFFICULT HAVE THESE PROBLEMS MADE IT FOR YOU TO DO YOUR WORK, TAKE CARE OF THINGS AT HOME, OR GET ALONG WITH OTHER PEOPLE: NOT DIFFICULT AT ALL
7. TROUBLE CONCENTRATING ON THINGS, SUCH AS READING THE NEWSPAPER OR WATCHING TELEVISION: NOT AT ALL
6. FEELING BAD ABOUT YOURSELF - OR THAT YOU ARE A FAILURE OR HAVE LET YOURSELF OR YOUR FAMILY DOWN: NOT AT ALL
1. LITTLE INTEREST OR PLEASURE IN DOING THINGS: NOT AT ALL
3. TROUBLE FALLING OR STAYING ASLEEP: NOT AT ALL
1. LITTLE INTEREST OR PLEASURE IN DOING THINGS: NOT AT ALL
4. FEELING TIRED OR HAVING LITTLE ENERGY: NOT AT ALL
6. FEELING BAD ABOUT YOURSELF - OR THAT YOU ARE A FAILURE OR HAVE LET YOURSELF OR YOUR FAMILY DOWN: NOT AT ALL

## 2025-08-04 ENCOUNTER — OFFICE VISIT (OUTPATIENT)
Dept: PSYCHOLOGY | Age: 75
End: 2025-08-04
Payer: MEDICARE

## 2025-08-04 DIAGNOSIS — Z63.0 RELATIONSHIP PARTNER UNWELL: ICD-10-CM

## 2025-08-04 DIAGNOSIS — F43.22 ADJUSTMENT DISORDER WITH ANXIOUS MOOD: Primary | ICD-10-CM

## 2025-08-04 DIAGNOSIS — Z63.0 MARITAL CONFLICT: ICD-10-CM

## 2025-08-04 PROCEDURE — 90834 PSYTX W PT 45 MINUTES: CPT | Performed by: STUDENT IN AN ORGANIZED HEALTH CARE EDUCATION/TRAINING PROGRAM

## 2025-08-04 PROCEDURE — 1123F ACP DISCUSS/DSCN MKR DOCD: CPT | Performed by: STUDENT IN AN ORGANIZED HEALTH CARE EDUCATION/TRAINING PROGRAM

## 2025-08-04 SDOH — SOCIAL STABILITY - SOCIAL INSECURITY: PROBLEMS IN RELATIONSHIP WITH SPOUSE OR PARTNER: Z63.0

## (undated) DEVICE — THE DISPOSABLE ROTH NET FOREIGN BODY STANDARD RETRIEVAL DEVICE IS USED IN THE ENDOSCOPIC RETRIEVAL OF FOREIGN BODY, FOOD BOLUS AND EXCISED TISSUE SUCH AS POLYPS.: Brand: ROTH NET

## (undated) DEVICE — ENDOSCOPY KIT: Brand: MEDLINE INDUSTRIES, INC.

## (undated) DEVICE — NEEDLE SPINAL 22GA L3.5IN SPINOCAN

## (undated) DEVICE — BITE BLOCK ENDOSCP AD 60 FR W/ ADJ STRP PLAS GRN BLOX

## (undated) DEVICE — CONTAINER SPEC 480ML CLR POLYSTYR 10% NEUT BUFF FRMLN ZN

## (undated) DEVICE — CLIP LIG L235CM RESOL 360 BX/20

## (undated) DEVICE — EPIDURAL TRAY: Brand: MEDLINE INDUSTRIES, INC.

## (undated) DEVICE — ELECTRODE PT RET AD L9FT HI MOIST COND ADH HYDRGEL CORDED

## (undated) DEVICE — NERVE BLOCK TRAY: Brand: MEDLINE INDUSTRIES, INC.

## (undated) DEVICE — BALLOON ENDO FOR CLR VISIBILITY OF EUS PROC FITS OLY PENTAX

## (undated) DEVICE — AVANOS* TUOHY EPIDURAL NEEDLE: Brand: AVANOS

## (undated) DEVICE — ENDOSCOPIC MUCOSAL RESECTION DEVICE: Brand: CAPTIVATOR EMR

## (undated) DEVICE — BITE BLK 60FR GRN ENDOSCP AD W STRP SLD DISPOSABLE

## (undated) DEVICE — FORCEPS BX 240CM 2.4MM L NDL RAD JAW 4 M00513334

## (undated) DEVICE — REPLAY HEMOSTASIS CLIP, 16MM SPAN: Brand: REPLAY